# Patient Record
Sex: FEMALE | Race: WHITE | Employment: FULL TIME | URBAN - METROPOLITAN AREA
[De-identification: names, ages, dates, MRNs, and addresses within clinical notes are randomized per-mention and may not be internally consistent; named-entity substitution may affect disease eponyms.]

---

## 2017-01-21 DIAGNOSIS — R05.9 COUGH: ICD-10-CM

## 2017-01-21 DIAGNOSIS — Z91.09 ENVIRONMENTAL ALLERGIES: ICD-10-CM

## 2017-01-21 RX ORDER — SERTRALINE HYDROCHLORIDE 100 MG/1
TABLET, FILM COATED ORAL
Qty: 30 TAB | Refills: 0 | Status: SHIPPED | OUTPATIENT
Start: 2017-01-21 | End: 2017-03-24 | Stop reason: SDUPTHER

## 2017-01-21 RX ORDER — MONTELUKAST SODIUM 10 MG/1
TABLET ORAL
Qty: 30 TAB | Refills: 0 | Status: SHIPPED | OUTPATIENT
Start: 2017-01-21 | End: 2017-03-24 | Stop reason: SDUPTHER

## 2017-01-22 DIAGNOSIS — R05.9 COUGH: ICD-10-CM

## 2017-01-22 DIAGNOSIS — Z91.09 ENVIRONMENTAL ALLERGIES: ICD-10-CM

## 2017-01-22 RX ORDER — MONTELUKAST SODIUM 10 MG/1
TABLET ORAL
Qty: 30 TAB | Refills: 0 | OUTPATIENT
Start: 2017-01-22

## 2017-02-24 DIAGNOSIS — R05.9 COUGH: ICD-10-CM

## 2017-02-24 DIAGNOSIS — Z91.09 ENVIRONMENTAL ALLERGIES: ICD-10-CM

## 2017-02-24 RX ORDER — SERTRALINE HYDROCHLORIDE 100 MG/1
TABLET, FILM COATED ORAL
Qty: 30 TAB | Refills: 0 | OUTPATIENT
Start: 2017-02-24

## 2017-02-24 RX ORDER — MONTELUKAST SODIUM 10 MG/1
TABLET ORAL
Qty: 30 TAB | Refills: 0 | OUTPATIENT
Start: 2017-02-24

## 2017-03-18 DIAGNOSIS — Z91.09 ENVIRONMENTAL ALLERGIES: ICD-10-CM

## 2017-03-18 DIAGNOSIS — R05.9 COUGH: ICD-10-CM

## 2017-03-18 RX ORDER — MONTELUKAST SODIUM 10 MG/1
TABLET ORAL
Qty: 30 TAB | Refills: 0 | OUTPATIENT
Start: 2017-03-18

## 2017-03-24 ENCOUNTER — OFFICE VISIT (OUTPATIENT)
Dept: INTERNAL MEDICINE CLINIC | Age: 42
End: 2017-03-24

## 2017-03-24 VITALS
HEART RATE: 90 BPM | SYSTOLIC BLOOD PRESSURE: 112 MMHG | DIASTOLIC BLOOD PRESSURE: 78 MMHG | BODY MASS INDEX: 35.74 KG/M2 | HEIGHT: 62 IN | OXYGEN SATURATION: 99 % | RESPIRATION RATE: 16 BRPM | WEIGHT: 194.2 LBS | TEMPERATURE: 97.1 F

## 2017-03-24 DIAGNOSIS — Z91.09 ENVIRONMENTAL ALLERGIES: ICD-10-CM

## 2017-03-24 DIAGNOSIS — R42 LIGHTHEADEDNESS: Primary | ICD-10-CM

## 2017-03-24 DIAGNOSIS — F32.9 REACTIVE DEPRESSION (SITUATIONAL): ICD-10-CM

## 2017-03-24 RX ORDER — MONTELUKAST SODIUM 10 MG/1
TABLET ORAL
Qty: 90 TAB | Refills: 1 | Status: SHIPPED | OUTPATIENT
Start: 2017-03-24 | End: 2017-09-19 | Stop reason: SDUPTHER

## 2017-03-24 RX ORDER — SERTRALINE HYDROCHLORIDE 100 MG/1
TABLET, FILM COATED ORAL
Qty: 90 TAB | Refills: 1 | Status: SHIPPED | OUTPATIENT
Start: 2017-03-24 | End: 2017-09-19 | Stop reason: SDUPTHER

## 2017-03-24 NOTE — PROGRESS NOTES
Chief Complaint   Patient presents with    Medication Refill    Dizziness     light headedness about one week     Pt states she has not passed out but feels that she may but upon sitting she feels slightly better.  Pt also mentioned she feels weird feeling in legs (neuro discomfort)

## 2017-03-24 NOTE — PROGRESS NOTES
Subjective:      Tres Chin is a 37 y.o. female who presents today for med refills and lightheadedness. This past week she has an intermittent dull, generalized headaches and lightheadedness x 1 week. Sx not aggravated by position change. LMP 2/25/2017. Mild ear pressure and sinus pressure. Intermittent sneezing, rhinorrhea, cough. She takes Flonase and Allegra daily (which she has taken for years). Also using netti pot prn. Ran out of Singulair and Zoloft 2 weeks ago. She has been feeling more sad recently. No SI or HI, n/v, f/c, aura, tinnitus. Current Outpatient Prescriptions   Medication Sig Dispense Refill    montelukast (SINGULAIR) 10 mg tablet TAKE 1 TABLET BY MOUTH EVERY DAY 30 Tab 0    sertraline (ZOLOFT) 100 mg tablet TAKE 1 TABLET BY MOUTH EVERY DAY 30 Tab 0    naproxen (NAPROSYN) 500 mg tablet Take 1 Tab by mouth two (2) times daily (with meals). 30 Tab 0    SUMAtriptan (IMITREX) 50 mg tablet Take 1 Tab by mouth once as needed for Migraine (may repeat in 2 hours after initial dose) for up to 1 dose. 10 Tab 0    fluticasone (FLONASE) 50 mcg/actuation nasal spray nightly.  Cholecalciferol, Vitamin D3, (VITAMIN D3) 2,000 unit cap capsule Take  by mouth daily.  ascorbic acid (VITAMIN C) 250 mg tablet Take 250 mg by mouth daily.  multivitamin (ONE A DAY) tablet Take 1 Tab by mouth daily.  fexofenadine (ALLEGRA) 180 mg tablet Take 180 mg by mouth daily. No Known Allergies  Past Medical History:   Diagnosis Date    Allergic rhinitis     Infertility     Dr. Ruslan Randolph Reactive depression (situational)     Situational stress     Vitamin D deficiency 2/2015        Review of Systems    Pertinent items are noted in HPI.      Objective:     Visit Vitals    /78 (BP 1 Location: Left arm, BP Patient Position: Sitting)    Pulse 90    Temp 97.1 °F (36.2 °C) (Oral)    Resp 16    Ht 5' 2\" (1.575 m)    Wt 194 lb 3.2 oz (88.1 kg)    LMP 02/25/2017 (Approximate)  SpO2 99%    BMI 35.52 kg/m2     General appearance: alert, cooperative, no distress, appears stated age, pleasant talkative white morbidly obese female  HEENT: TM's dull, nasal mucosa boggy, no swollen turbinates, pnd, posterior pharynx non-erythematous  Neck: supple, symmetrical, trachea midline and no adenopathy  Lungs: clear to auscultation bilaterally  Heart: regular rate and rhythm, S1, S2 normal, no murmur, click, rub or gallop  Neuro: A&Ox3, CN II-XII intact, gait and coordination normal  Psych: appropriate mood and affect. Behavior is normal. Judgment and thought content normal.    Assessment/Plan:   Lightheadedness - likely d/t stopping Zoloft 100 mg abruptly as well as uncontrolled allergies. Restart both Zoloft and Singulair. Start at 50 mg of ZOloft x 1 week then 100 mg daily. Continue Allegra and Flonase. Follow up if symptoms worsen or fail to improve. -     CBC W/O DIFF  -     METABOLIC PANEL, COMPREHENSIVE  -     TSH RFX ON ABNORMAL TO FREE T4    Environmental allergies  -     montelukast (SINGULAIR) 10 mg tablet; TAKE 1 TABLET BY MOUTH EVERY DAY    Reactive depression (situational)   -     sertraline (ZOLOFT) 100 mg tablet; TAKE 1 TABLET BY MOUTH EVERY DAY    Follow-up Disposition:  Return in about 3 months (around 6/24/2017) for physical and re-establish care with Dr. Reji Jose or sooner if needed. Advised her to call back or return to office if symptoms worsen/change/persist.  Discussed expected course/resolution/complications of diagnosis in detail with patient. Medication risks/benefits/costs/interactions/alternatives discussed with patient. She was given an after visit summary which includes diagnoses, current medications, & vitals. She expressed understanding with the diagnosis and plan.     David Nuñez PA-C

## 2017-03-24 NOTE — MR AVS SNAPSHOT
Visit Information Date & Time Provider Department Dept. Phone Encounter #  
 3/24/2017 12:20 PM KATE A[facility] [phone] Follow-up Instructions Return in about 3 months (around 6/24/2017) for physical and re-establish care with Dr. Summer Cruz or sooner if needed. Upcoming Health Maintenance Date Due  
 PAP AKA CERVICAL CYTOLOGY 11/3/2018 DTaP/Tdap/Td series (2 - Td) 11/3/2025 Allergies as of 3/24/2017  Review Complete On: 3/24/2017 By: Soledad Mitchell No Known Allergies Current Immunizations  Reviewed on 11/3/2014 Name Date Influenza Vaccine 10/29/2014, 9/3/2013 Tdap 11/3/2015 Not reviewed this visit You Were Diagnosed With   
  
 Codes Comments Lightheadedness    -  Primary ICD-10-CM: P02 ICD-9-CM: 780.4 Environmental allergies     ICD-10-CM: Z91.09 
ICD-9-CM: V15.09 Reactive depression (situational)     ICD-10-CM: F32.9 ICD-9-CM: 300.4 Vitals BP Pulse Temp Resp Height(growth percentile) Weight(growth percentile) 112/78 (BP 1 Location: Left arm, BP Patient Position: Sitting) 90 97.1 °F (36.2 °C) (Oral) 16 5' 2\" (1.575 m) 194 lb 3.2 oz (88.1 kg) LMP SpO2 BMI OB Status Smoking Status 02/25/2017 (Approximate) 99% 35.52 kg/m2 Having regular periods Never Smoker Vitals History BMI and BSA Data Body Mass Index Body Surface Area 35.52 kg/m 2 1.96 m 2 Preferred Pharmacy Pharmacy Name Phone [id number] [phone] Your Updated Medication List  
  
   
This list is accurate as of: 3/24/17  1:00 PM.  Always use your most recent med list. ALLEGRA 180 mg tablet Generic drug:  fexofenadine Take 180 mg by mouth daily. FLONASE 50 mcg/actuation nasal spray Generic drug:  fluticasone  
nightly. montelukast 10 mg tablet Commonly known as:  SINGULAIR  
 TAKE 1 TABLET BY MOUTH EVERY DAY  
  
 multivitamin tablet Commonly known as:  ONE A DAY Take 1 Tab by mouth daily. naproxen 500 mg tablet Commonly known as:  NAPROSYN Take 1 Tab by mouth two (2) times daily (with meals). sertraline 100 mg tablet Commonly known as:  ZOLOFT  
TAKE 1 TABLET BY MOUTH EVERY DAY  
  
 SUMAtriptan 50 mg tablet Commonly known as:  IMITREX Take 1 Tab by mouth once as needed for Migraine (may repeat in 2 hours after initial dose) for up to 1 dose. VITAMIN C 250 mg tablet Generic drug:  ascorbic acid (vitamin C) Take 250 mg by mouth daily. VITAMIN D3 2,000 unit Cap capsule Generic drug:  Cholecalciferol (Vitamin D3) Take  by mouth daily. Prescriptions Sent to Pharmacy Refills  
 sertraline (ZOLOFT) 100 mg tablet 1 Sig: TAKE 1 TABLET BY MOUTH EVERY DAY Class: Normal  
 Pharmacy: [facility]  Ph #: [phone]  
 montelukast (SINGULAIR) 10 mg tablet 1 Sig: TAKE 1 TABLET BY MOUTH EVERY DAY Class: Normal  
 Pharmacy: [address] Ph #: [phone] We Performed the Following CBC W/O DIFF [78885 CPT(R)] METABOLIC PANEL, COMPREHENSIVE [44997 CPT(R)] TSH RFX ON ABNORMAL TO FREE T4 [[id number] Custom] Follow-up Instructions Return in about 3 months (around 6/24/2017) for physical and re-establish care with Dr. Tuan Kan or sooner if needed. Introducing [facility] & HEALTH SERVICES! Dear Mouna Diaz: 
Thank you for requesting a [employer] account. Our records indicate that you already have an active [employer] account. You can access your account anytime at [email or web address] Did you know that you can access your hospital and ER discharge instructions at any time in [employer]? You can also review all of your test results from your hospital stay or ER visit. Additional Information If you have questions, please visit the Frequently Asked Questions section of the [employer] website at [email or web address] Remember, [employer]Builder is NOT to be used for urgent needs. For medical emergencies, dial 911. Now available from your iPhone and Android! Please provide this summary of care documentation to your next provider. Your primary care clinician is listed as Tony Estrada. If you have any questions after today's visit, please call [phone].

## 2017-03-25 LAB
ALBUMIN SERPL-MCNC: 4.5 G/DL (ref 3.5–5.5)
ALBUMIN/GLOB SERPL: 1.9 {RATIO} (ref 1.2–2.2)
ALP SERPL-CCNC: 73 IU/L (ref 39–117)
ALT SERPL-CCNC: 21 IU/L (ref 0–32)
AST SERPL-CCNC: 19 IU/L (ref 0–40)
BILIRUB SERPL-MCNC: 0.2 MG/DL (ref 0–1.2)
BUN SERPL-MCNC: 11 MG/DL (ref 6–24)
BUN/CREAT SERPL: 19 (ref 9–23)
CALCIUM SERPL-MCNC: 9.2 MG/DL (ref 8.7–10.2)
CHLORIDE SERPL-SCNC: 100 MMOL/L (ref 96–106)
CO2 SERPL-SCNC: 24 MMOL/L (ref 18–29)
CREAT SERPL-MCNC: 0.57 MG/DL (ref 0.57–1)
ERYTHROCYTE [DISTWIDTH] IN BLOOD BY AUTOMATED COUNT: 14.2 % (ref 12.3–15.4)
GLOBULIN SER CALC-MCNC: 2.4 G/DL (ref 1.5–4.5)
GLUCOSE SERPL-MCNC: 88 MG/DL (ref 65–99)
HCT VFR BLD AUTO: 38.7 % (ref 34–46.6)
HGB BLD-MCNC: 12.9 G/DL (ref 11.1–15.9)
MCH RBC QN AUTO: 30.1 PG (ref 26.6–33)
MCHC RBC AUTO-ENTMCNC: 33.3 G/DL (ref 31.5–35.7)
MCV RBC AUTO: 90 FL (ref 79–97)
PLATELET # BLD AUTO: 251 X10E3/UL (ref 150–379)
POTASSIUM SERPL-SCNC: 4.2 MMOL/L (ref 3.5–5.2)
PROT SERPL-MCNC: 6.9 G/DL (ref 6–8.5)
RBC # BLD AUTO: 4.29 X10E6/UL (ref 3.77–5.28)
SODIUM SERPL-SCNC: 140 MMOL/L (ref 134–144)
TSH SERPL DL<=0.005 MIU/L-ACNC: 3.63 UIU/ML (ref 0.45–4.5)
WBC # BLD AUTO: 7.2 X10E3/UL (ref 3.4–10.8)

## 2017-06-26 ENCOUNTER — OFFICE VISIT (OUTPATIENT)
Dept: INTERNAL MEDICINE CLINIC | Age: 42
End: 2017-06-26

## 2017-06-26 VITALS
RESPIRATION RATE: 16 BRPM | HEIGHT: 62 IN | DIASTOLIC BLOOD PRESSURE: 62 MMHG | HEART RATE: 65 BPM | BODY MASS INDEX: 35.74 KG/M2 | SYSTOLIC BLOOD PRESSURE: 110 MMHG | TEMPERATURE: 97.8 F | WEIGHT: 194.2 LBS | OXYGEN SATURATION: 98 %

## 2017-06-26 DIAGNOSIS — F32.9 REACTIVE DEPRESSION: ICD-10-CM

## 2017-06-26 DIAGNOSIS — Z00.00 ROUTINE GENERAL MEDICAL EXAMINATION AT A HEALTH CARE FACILITY: Primary | ICD-10-CM

## 2017-06-26 DIAGNOSIS — E78.5 HYPERLIPIDEMIA, UNSPECIFIED HYPERLIPIDEMIA TYPE: ICD-10-CM

## 2017-06-26 NOTE — PROGRESS NOTES
Chief Complaint   Patient presents with    Complete Physical     (Rm #10)     1. Have you been to the ER, urgent care clinic since your last visit? Hospitalized since your last visit? No    2. Have you seen or consulted any other health care providers outside of the [address] [place] [address] since your last visit? Include any pap smears or colon screening.  No

## 2017-06-26 NOTE — PROGRESS NOTES
Complete Physical ((Rm #10))       HPI:  Claudene Beach is a 40y.o. year old female who is here to establish care. She  had her medical care:    ADM      She reports the following history and medical concerns:      zoloft 100 mg- anxiety and depression. Had withdrawal when trying to stop on it. A couple of years ago. Situational 2 years ago,  Ok to cut in half if she wants. Allergies- singulair 10 mg daily. Needs to take it every day, flonase and allegra. Alleve- once a week. Take with food in stomach      Migraines- last episode this summer    Takes vitamin D    Chol at work was high. January 2017    Overweight- exercises (starting program). Assessment and Plan        1. Routine general medical examination at a health care facility  Well exam.  Needs labs. Start exercise program.   - CBC WITH AUTOMATED DIFF; Future  - METABOLIC PANEL, COMPREHENSIVE; Future  - LIPID PANEL; Future  - TSH 3RD GENERATION; Future  - URINALYSIS W/ RFLX MICROSCOPIC; Future  - VITAMIN D, 25 HYDROXY; Future  - CBC WITH AUTOMATED DIFF; Future  - METABOLIC PANEL, COMPREHENSIVE; Future  - LIPID PANEL; Future  - TSH 3RD GENERATION; Future  - URINALYSIS W/ RFLX MICROSCOPIC; Future  - VITAMIN D, 25 HYDROXY; Future    2. Reactive depression  Reviewed side effects of medication and states depression is stable. No impulsive thoughts, sleep is not affected by medication. No GI symptoms. Pt instructed to call if above symptoms and agreed to stay on  Sertraline but ok to try half. 3. Hyperlipidemia, unspecified hyperlipidemia type  Not on meds. Hx of high chol. Diet reviewed. Recheck chol.  - LIPID PANEL;  Future        Visit Vitals    /62    Pulse 65    Temp 97.8 °F (36.6 °C) (Oral)    Resp 16    Ht 5' 2\" (1.575 m)    Wt 194 lb 3.2 oz (88.1 kg)    SpO2 98%    BMI 35.52 kg/m2       Historical Data    Past Medical History:   Diagnosis Date    Allergic rhinitis     Infertility     Dr. Bridgette Brock Reactive depression (situational)     Situational stress     Vitamin D deficiency 2/2015       Past Surgical History:   Procedure Laterality Date    HX ORTHOPAEDIC  2010    ORIF L ankle       Outpatient Encounter Prescriptions as of 6/26/2017   Medication Sig Dispense Refill    sertraline (ZOLOFT) 100 mg tablet TAKE 1 TABLET BY MOUTH EVERY DAY 90 Tab 1    montelukast (SINGULAIR) 10 mg tablet TAKE 1 TABLET BY MOUTH EVERY DAY 90 Tab 1    naproxen (NAPROSYN) 500 mg tablet Take 1 Tab by mouth two (2) times daily (with meals). 30 Tab 0    fluticasone (FLONASE) 50 mcg/actuation nasal spray nightly.  ascorbic acid (VITAMIN C) 250 mg tablet Take 250 mg by mouth daily.  multivitamin (ONE A DAY) tablet Take 1 Tab by mouth daily.  fexofenadine (ALLEGRA) 180 mg tablet Take 180 mg by mouth daily.  SUMAtriptan (IMITREX) 50 mg tablet Take 1 Tab by mouth once as needed for Migraine (may repeat in 2 hours after initial dose) for up to 1 dose. 10 Tab 0    Cholecalciferol, Vitamin D3, (VITAMIN D3) 2,000 unit cap capsule Take  by mouth daily. No facility-administered encounter medications on file as of 6/26/2017. No Known Allergies     Social History     Social History    Marital status:      Spouse name: N/A    Number of children: N/A    Years of education: N/A     Occupational History    Not on file. Social History Main Topics    Smoking status: Never Smoker    Smokeless tobacco: Never Used    Alcohol use 0.0 - 0.5 oz/week     0 - 1 Glasses of wine per week    Drug use: No    Sexual activity: Yes     Partners: Male     Other Topics Concern    Not on file     Social History Narrative        family history includes Asthma in her brother and father; Heart Disease (age of onset: 76) in her father; Taryn Mileyy (age of onset: 40) in her brother; Obesity in her mother. Review of Systems   Constitutional: Negative for weight loss. Eyes: Negative for blurred vision. Respiratory: Negative for shortness of breath. Cardiovascular: Negative for chest pain. Gastrointestinal: Negative for abdominal pain. Genitourinary: Negative for dysuria and frequency. Skin: Negative for rash. Neurological: Negative for dizziness, focal weakness, weakness and headaches. Endo/Heme/Allergies: Negative for environmental allergies. Does not bruise/bleed easily. Physical Exam   Constitutional: She is oriented to person, place, and time. She appears well-developed and well-nourished. No distress. HENT:   Mouth/Throat: No oropharyngeal exudate. Eyes: Conjunctivae are normal. Pupils are equal, round, and reactive to light. Left eye exhibits no discharge. No scleral icterus. Neck: No thyromegaly present. Cardiovascular: Normal rate, regular rhythm and normal heart sounds. Exam reveals no gallop and no friction rub. No murmur heard. Pulmonary/Chest: No respiratory distress. Abdominal: She exhibits no distension. Musculoskeletal: Normal range of motion. She exhibits no edema or deformity. Lymphadenopathy:     She has no cervical adenopathy. Neurological: She is alert and oriented to person, place, and time. Skin: Skin is warm and dry. No erythema. Psychiatric: She has a normal mood and affect. Judgment and thought content normal.   Nursing note and vitals reviewed. Ortho Exam       No orders of the defined types were placed in this encounter. I have reviewed the patient's medical history in detail and updated the computerized patient record. We had a prolonged discussion about these complex clinical issues and went over the various important aspects to consider. All questions were answered. Advised her to call back or return to office if symptoms do not improve, change in nature, or persist.    She was given an after visit summary or informed of Familiar Access which includes patient instructions, diagnoses, current medications, & vitals.   She expressed understanding with the diagnosis and plan.

## 2017-09-19 DIAGNOSIS — F32.9 REACTIVE DEPRESSION (SITUATIONAL): ICD-10-CM

## 2017-09-19 DIAGNOSIS — Z91.09 ENVIRONMENTAL ALLERGIES: ICD-10-CM

## 2017-09-19 RX ORDER — MONTELUKAST SODIUM 10 MG/1
TABLET ORAL
Qty: 90 TAB | Refills: 3 | Status: SHIPPED | OUTPATIENT
Start: 2017-09-19 | End: 2018-12-07

## 2017-09-19 RX ORDER — SERTRALINE HYDROCHLORIDE 100 MG/1
TABLET, FILM COATED ORAL
Qty: 90 TAB | Refills: 3 | Status: SHIPPED | OUTPATIENT
Start: 2017-09-19 | End: 2018-11-15 | Stop reason: SDUPTHER

## 2017-09-19 NOTE — TELEPHONE ENCOUNTER
Last office visit- 6/26/17  Next office visit- 6/27/18  Last Refill- 3/24/17      Requested Prescriptions     Pending Prescriptions Disp Refills    montelukast (SINGULAIR) 10 mg tablet 90 Tab 1     Sig: TAKE 1 TABLET BY MOUTH EVERY DAY    sertraline (ZOLOFT) 100 mg tablet 90 Tab 1     Sig: TAKE 1 TABLET BY MOUTH EVERY DAY

## 2018-04-04 ENCOUNTER — OFFICE VISIT (OUTPATIENT)
Dept: INTERNAL MEDICINE CLINIC | Age: 43
End: 2018-04-04

## 2018-04-04 VITALS
DIASTOLIC BLOOD PRESSURE: 84 MMHG | SYSTOLIC BLOOD PRESSURE: 142 MMHG | WEIGHT: 194 LBS | RESPIRATION RATE: 14 BRPM | HEIGHT: 62 IN | TEMPERATURE: 97.6 F | HEART RATE: 82 BPM | BODY MASS INDEX: 35.7 KG/M2 | OXYGEN SATURATION: 98 %

## 2018-04-04 DIAGNOSIS — Z99.89 OSA ON CPAP: ICD-10-CM

## 2018-04-04 DIAGNOSIS — G47.33 OSA ON CPAP: ICD-10-CM

## 2018-04-04 DIAGNOSIS — H10.31 ACUTE CONJUNCTIVITIS OF RIGHT EYE, UNSPECIFIED ACUTE CONJUNCTIVITIS TYPE: Primary | ICD-10-CM

## 2018-04-04 RX ORDER — CIPROFLOXACIN HYDROCHLORIDE 3.5 MG/ML
SOLUTION/ DROPS TOPICAL
Qty: 10 ML | Refills: 0 | Status: SHIPPED | OUTPATIENT
Start: 2018-04-04 | End: 2018-06-27

## 2018-04-04 NOTE — MR AVS SNAPSHOT
[facility], [address] 
[phone] Patient: Aurelio Vaughan MRN: [id number] ZFO:[birth year] Visit Information Date & Time Provider Department Dept. Phone Encounter #  
 4/4/2018 10:00 AM JUANA Pacheco [facility] [phone] [id number] Your Appointments 6/27/2018  8:15 AM  
Complete Physical with MD Williams Jordan [facility]) Appt Note: physical  
 [address], [address]  
[facility] [address], [place] [address] [facility] [address] Upcoming Health Maintenance Date Due  
 PAP AKA CERVICAL CYTOLOGY 11/3/2018 DTaP/Tdap/Td series (2 - Td) 11/3/2025 Allergies as of 4/4/2018  Review Complete On: 4/4/2018 By: Erwin Bettencourt NP No Known Allergies Current Immunizations  Reviewed on 11/3/2014 Name Date Influenza Vaccine 10/29/2014, 9/3/2013 Influenza Vaccine Ignacio Kappa) 10/17/2017 Tdap 11/3/2015 Not reviewed this visit You Were Diagnosed With   
  
 Codes Comments Acute conjunctivitis of right eye, unspecified acute conjunctivitis type    -  Primary ICD-10-CM: H10.31 ICD-9-CM: 372.00 LEONIE on CPAP     ICD-10-CM: G47.33, Z99.89 ICD-9-CM: 327.23, V46.8 Vitals BP Pulse Temp Resp Height(growth percentile) Weight(growth percentile) 142/84 (BP 1 Location: Left arm, BP Patient Position: Sitting) 82 97.6 °F (36.4 °C) (Oral) 14 5' 2\" (1.575 m) 194 lb (88 kg) SpO2 BMI OB Status Smoking Status 98% 35.48 kg/m2 Having regular periods Never Smoker Vitals History BMI and BSA Data Body Mass Index Body Surface Area  
 35.48 kg/m 2 1.96 m 2 Preferred Pharmacy [facility] Name Phone [id number] [phone] Your Updated Medication List  
  
   
 This list is accurate as of 18 11:04 AM.  Always use your most recent med list. ALLEGRA 180 mg tablet Generic drug:  fexofenadine Take 180 mg by mouth daily. ciprofloxacin HCl 0.3 % ophthalmic solution Commonly known as:  CILOXAN  
1 drop into affected eye (s) every 2 hrs while awake for 24 hrs, then QID for 5 days FLONASE 50 mcg/actuation nasal spray Generic drug:  fluticasone  
nightly. montelukast 10 mg tablet Commonly known as:  SINGULAIR  
TAKE 1 TABLET BY MOUTH EVERY DAY  
  
 multivitamin tablet Commonly known as:  ONE A DAY Take 1 Tab by mouth daily. OMEGA 3 PO Take  by mouth. sertraline 100 mg tablet Commonly known as:  ZOLOFT  
TAKE 1 TABLET BY MOUTH EVERY DAY  
  
 SUMAtriptan 50 mg tablet Commonly known as:  IMITREX Take 1 Tab by mouth once as needed for Migraine (may repeat in 2 hours after initial dose) for up to 1 dose. VITAMIN D3 2,000 unit Cap capsule Generic drug:  Cholecalciferol (Vitamin D3) Take  by mouth daily. Prescriptions Sent to Pharmacy Refills  
 ciprofloxacin HCl (CILOXAN) 0.3 % ophthalmic solution 0 Si drop into affected eye (s) every 2 hrs while awake for 24 hrs, then QID for 5 days Class: Normal  
 Pharmacy: [address] [place] [address] #: [phone] Patient Instructions Pinkeye: Care Instructions Your Care Instructions Pinkeye is redness and swelling of the eye surface and the conjunctiva (the lining of the eyelid and the covering of the white part of the eye). Pinkeye is also called conjunctivitis. Pinkeye is often caused by infection with bacteria or a virus. Dry air, allergies, smoke, and chemicals are other common causes. Pinkeye often clears on its own in 7 to 10 days. Antibiotics only help if the pinkeye is caused by bacteria.  Pinkeye caused by infection spreads easily. If an allergy or chemical is causing pinkeye, it will not go away unless you can avoid whatever is causing it. Follow-up care is a key part of your treatment and safety. Be sure to make and go to all appointments, and call your doctor if you are having problems. It's also a good idea to know your test results and keep a list of the medicines you take. How can you care for yourself at home? · Wash your hands often. Always wash them before and after you treat pinkeye or touch your eyes or face. · Use moist cotton or a clean, wet cloth to remove crust. Wipe from the inside corner of the eye to the outside. Use a clean part of the cloth for each wipe. · Put cold or warm wet cloths on your eye a few times a day if the eye hurts. · Do not wear contact lenses or eye makeup until the pinkeye is gone. Throw away any eye makeup you were using when you got pinkeye. Clean your contacts and storage case. If you wear disposable contacts, use a new pair when your eye has cleared and it is safe to wear contacts again. · If the doctor gave you antibiotic ointment or eyedrops, use them as directed. Use the medicine for as long as instructed, even if your eye starts looking better soon. Keep the bottle tip clean, and do not let it touch the eye area. · To put in eyedrops or ointment: ¨ Tilt your head back, and pull your lower eyelid down with one finger. ¨ Drop or squirt the medicine inside the lower lid. ¨ Close your eye for 30 to 60 seconds to let the drops or ointment move around. ¨ Do not touch the ointment or dropper tip to your eyelashes or any other surface. · Do not share towels, pillows, or washcloths while you have pinkeye. When should you call for help? Call your doctor now or seek immediate medical care if: 
? · You have pain in your eye, not just irritation on the surface. ? · You have a change in vision or loss of vision. ? · You have an increase in discharge from the eye. ? · Your eye has not started to improve or begins to get worse within 48 hours after you start using antibiotics. ? · Pinkeye lasts longer than 7 days. ? Watch closely for changes in your health, and be sure to contact your doctor if you have any problems. Where can you learn more? Go to http://go-juani.info/. Enter Y392 in the search box to learn more about \"Pinkeye: Care Instructions. \" Current as of: March 20, 2017 Content Version: 11.4 © 1981-9981 [employer]. Care instructions adapted under license by [employer] (which disclaims liability or warranty for this information). If you have questions about a medical condition or this instruction, always ask your healthcare professional. [address] any warranty or liability for your use of this information. Introducing [facility] & HEALTH SERVICES! Dear Jewel Kaplan: 
Thank you for requesting a tinyclues account. Our records indicate that you already have an active tinyclues account. You can access your account anytime at [email or web address] Did you know that you can access your hospital and ER discharge instructions at any time in [employer]clues? You can also review all of your test results from your hospital stay or ER visit. Additional Information If you have questions, please visit the Frequently Asked Questions section of the [employer]ues website at [email or web address] Remember, tinyclues is NOT to be used for urgent needs. For medical emergencies, dial 911. Now available from your iPhone and Android! Please provide this summary of care documentation to your next provider. Your primary care clinician is listed as Sharonda James. If you have any questions after today's visit, please call [phone].

## 2018-04-04 NOTE — PATIENT INSTRUCTIONS
Pinkeye: Care Instructions  Your Care Instructions    Pinkeye is redness and swelling of the eye surface and the conjunctiva (the lining of the eyelid and the covering of the white part of the eye). Pinkeye is also called conjunctivitis. Pinkeye is often caused by infection with bacteria or a virus. Dry air, allergies, smoke, and chemicals are other common causes. Pinkeye often clears on its own in 7 to 10 days. Antibiotics only help if the pinkeye is caused by bacteria. Pinkeye caused by infection spreads easily. If an allergy or chemical is causing pinkeye, it will not go away unless you can avoid whatever is causing it. Follow-up care is a key part of your treatment and safety. Be sure to make and go to all appointments, and call your doctor if you are having problems. It's also a good idea to know your test results and keep a list of the medicines you take. How can you care for yourself at home? · Wash your hands often. Always wash them before and after you treat pinkeye or touch your eyes or face. · Use moist cotton or a clean, wet cloth to remove crust. Wipe from the inside corner of the eye to the outside. Use a clean part of the cloth for each wipe. · Put cold or warm wet cloths on your eye a few times a day if the eye hurts. · Do not wear contact lenses or eye makeup until the pinkeye is gone. Throw away any eye makeup you were using when you got pinkeye. Clean your contacts and storage case. If you wear disposable contacts, use a new pair when your eye has cleared and it is safe to wear contacts again. · If the doctor gave you antibiotic ointment or eyedrops, use them as directed. Use the medicine for as long as instructed, even if your eye starts looking better soon. Keep the bottle tip clean, and do not let it touch the eye area. · To put in eyedrops or ointment:  ¨ Tilt your head back, and pull your lower eyelid down with one finger.   ¨ Drop or squirt the medicine inside the lower lid.  ¨ Close your eye for 30 to 60 seconds to let the drops or ointment move around. ¨ Do not touch the ointment or dropper tip to your eyelashes or any other surface. · Do not share towels, pillows, or washcloths while you have pinkeye. When should you call for help? Call your doctor now or seek immediate medical care if:  ? · You have pain in your eye, not just irritation on the surface. ? · You have a change in vision or loss of vision. ? · You have an increase in discharge from the eye.   ? · Your eye has not started to improve or begins to get worse within 48 hours after you start using antibiotics. ? · Pinkeye lasts longer than 7 days. ? Watch closely for changes in your health, and be sure to contact your doctor if you have any problems. Where can you learn more? Go to http://go-juani.info/. Enter Y392 in the search box to learn more about \"Pinkeye: Care Instructions. \"  Current as of: March 20, 2017  Content Version: 11.4  © 8778-9256 [employer]wise, Incorporated. Care instructions adapted under license by [employer] (which disclaims liability or warranty for this information). If you have questions about a medical condition or this instruction, always ask your healthcare professional. [address] any warranty or liability for your use of this information.

## 2018-04-04 NOTE — PROGRESS NOTES
41 yo female with 24 hrs of OD irritation and discharge; it was stuck shut this AM.  OS is not affected. No recent head cold. No feverishness, but mildly achy joints yesterday. She works with a college student population. PE: Overweight WF   Face - OD - conjunctival rednes and watery eye   Neck - no nodes    Imp: Conjunctivitis OD    Plan: Cipro ophth drops (used successfully in past)  _________________________  Expected course of current diagnosed problem(s) as well as expected progression and possible complications, and desired follow up with provider are discussed with patient. Patient is encouraged to be back in touch with any questions or concerns. Patient expresses understanding of plan of care. Patient is given AVS which includes diagnoses, current medications, vitals.

## 2018-04-04 NOTE — PROGRESS NOTES
Reviewed record in preparation for visit and have obtained necessary documentation. Identified pt with two pt identifiers(name and ). Health Maintenance Due   Topic    Influenza Age 5 to Adult   Done 10/2017 per patient. Chief Complaint   Patient presents with    Pink Eye     Pt c/o pink eye (OD) started yesterday, discharge, irritation, and swelling of her eyelid. Pt c/o chronic nasal congestion, no ear pain, has not used eye drops, or any sinus congestion.          Wt Readings from Last 3 Encounters:   18 194 lb (88 kg)   17 194 lb 3.2 oz (88.1 kg)   17 194 lb 3.2 oz (88.1 kg)     Temp Readings from Last 3 Encounters:   18 97.6 °F (36.4 °C) (Oral)   17 97.8 °F (36.6 °C) (Oral)   17 97.1 °F (36.2 °C) (Oral)     BP Readings from Last 3 Encounters:   18 142/84   17 110/62   17 112/78     Pulse Readings from Last 3 Encounters:   18 82   17 65   17 90

## 2018-04-04 NOTE — LETTER
NOTIFICATION RETURN TO WORK / SCHOOL 
 
4/4/2018 11:03 AM 
 
Ms. David Stockton [address] [id number] To Whom It May Concern: 
 
David Stockton is currently under the care of Jasvir Cruz. She will return to work on: April 6, 2018. If there are questions or concerns please have the patient contact our office. Sincerely, Subhash Villa NP

## 2018-06-27 ENCOUNTER — OFFICE VISIT (OUTPATIENT)
Dept: INTERNAL MEDICINE CLINIC | Age: 43
End: 2018-06-27

## 2018-06-27 VITALS
TEMPERATURE: 98.1 F | HEART RATE: 84 BPM | HEIGHT: 62 IN | RESPIRATION RATE: 15 BRPM | DIASTOLIC BLOOD PRESSURE: 80 MMHG | WEIGHT: 202 LBS | SYSTOLIC BLOOD PRESSURE: 110 MMHG | BODY MASS INDEX: 37.17 KG/M2 | OXYGEN SATURATION: 95 %

## 2018-06-27 DIAGNOSIS — F32.9 REACTIVE DEPRESSION (SITUATIONAL): ICD-10-CM

## 2018-06-27 DIAGNOSIS — E66.01 SEVERE OBESITY (BMI 35.0-39.9): ICD-10-CM

## 2018-06-27 DIAGNOSIS — Z99.89 OSA ON CPAP: ICD-10-CM

## 2018-06-27 DIAGNOSIS — E55.9 VITAMIN D DEFICIENCY: ICD-10-CM

## 2018-06-27 DIAGNOSIS — G47.33 OSA ON CPAP: ICD-10-CM

## 2018-06-27 DIAGNOSIS — Z00.00 ROUTINE GENERAL MEDICAL EXAMINATION AT A HEALTH CARE FACILITY: Primary | ICD-10-CM

## 2018-06-27 DIAGNOSIS — Z91.09 ENVIRONMENTAL ALLERGIES: ICD-10-CM

## 2018-06-27 NOTE — PROGRESS NOTES
HPI:  Carolyn Mcnamara is a 44y.o. year old female who is here for an annual physical:  LAST SEEN: Visit date not found     Wt Readings from Last 3 Encounters:   06/27/18 202 lb (91.6 kg)   04/04/18 194 lb (88 kg)   06/26/17 194 lb 3.2 oz (88.1 kg)     Temp Readings from Last 3 Encounters:   06/27/18 98.1 °F (36.7 °C) (Oral)   04/04/18 97.6 °F (36.4 °C) (Oral)   06/26/17 97.8 °F (36.6 °C) (Oral)     BP Readings from Last 3 Encounters:   06/27/18 110/80   04/04/18 142/84   06/26/17 110/62     Pulse Readings from Last 3 Encounters:   06/27/18 84   04/04/18 82   06/26/17 65        She reports the following history and medical concerns:      Under a lot of stress.  suffering mental issues. Eats emotionally. Eats 50-50 out. Has a puppy and walks regularly. Fit board. Going to the gym at [employer]. Was in an opera. Diagnosed with sleep apnea. Dr. Eliazar Briggs. Assessment and Plan        1. Routine general medical examination at a health care facility  Discussed weight. Finding more times of exercise  - cpap machine kit; by Does Not Apply route. - CBC WITH AUTOMATED DIFF  - LIPID PANEL  - TSH REFLEX TO T4  - METABOLIC PANEL, COMPREHENSIVE  - VITAMIN D, 25 HYDROXY  - MICROALBUMIN, UR, RAND W/ MICROALB/CREAT RATIO  - UA/M W/RFLX CULTURE, ROUTINE    2. LEONIE on CPAP  Patient has obstructive sleep apnea and reports compliance and benefit from the CPAP machine. Patient is aware of the complications of nonadherence like heart failure, high blood pressure in the lungs, daytime drowsiness that can lead to dangerous consequences in driving and operating machinery. Compliance will improve quality of life. 3. Vitamin D deficiency  Check Vit D    4. Severe obesity (BMI 35.0-39.9) ([facility])  Overweight   Patient with diagnosis of obesity and height of Height: 5' 2\" (157.5 cm), weight of Weight: 202 lb (91.6 kg) with BMI at Body mass index is 36.95 kg/(m^2).    Wt Readings from Last 3 Encounters:   06/27/18 202 lb (91.6 kg)   04/04/18 194 lb (88 kg)   06/26/17 194 lb 3.2 oz (88.1 kg)        Discussion of past diet and exercise efforts     5. Reactive depression (situational)  Doing well on zoloft. Reviewed side effects of medication and states depression is stable. No impulsive thoughts, sleep is not affected by medication. No GI symptoms. Pt instructed to call if above symptoms and agreed to stay on  zoloft             Historical Data    Vitals:    06/27/18 0810   BP: 110/80   Pulse: 84   Resp: 15   Temp: 98.1 °F (36.7 °C)   TempSrc: Oral   SpO2: 95%   Weight: 202 lb (91.6 kg)   Height: 5' 2\" (1.575 m)         Past Medical History:   Diagnosis Date    Allergic rhinitis     Infertility     Dr. Sonia Schwarz Reactive depression (situational)     Situational stress     Sleep apnea 03/2018    Sleep apnea 2018    Vitamin D deficiency 2/2015       Past Surgical History:   Procedure Laterality Date    HX ORTHOPAEDIC  2010    ORIF L ankle       Outpatient Encounter Prescriptions as of 6/27/2018   Medication Sig Dispense Refill    cpap machine kit by Does Not Apply route.  montelukast (SINGULAIR) 10 mg tablet TAKE 1 TABLET BY MOUTH EVERY DAY 90 Tab 3    sertraline (ZOLOFT) 100 mg tablet TAKE 1 TABLET BY MOUTH EVERY DAY 90 Tab 3    SUMAtriptan (IMITREX) 50 mg tablet Take 1 Tab by mouth once as needed for Migraine (may repeat in 2 hours after initial dose) for up to 1 dose. 10 Tab 0    fluticasone (FLONASE) 50 mcg/actuation nasal spray nightly.  multivitamin (ONE A DAY) tablet Take 1 Tab by mouth daily.  fexofenadine (ALLEGRA) 180 mg tablet Take 180 mg by mouth daily.  [DISCONTINUED] FLAXSEED OIL (OMEGA 3 PO) Take  by mouth.       [DISCONTINUED] ciprofloxacin HCl (CILOXAN) 0.3 % ophthalmic solution 1 drop into affected eye (s) every 2 hrs while awake for 24 hrs, then QID for 5 days 10 mL 0    [DISCONTINUED] Cholecalciferol, Vitamin D3, (VITAMIN D3) 2,000 unit cap capsule Take  by mouth daily. No facility-administered encounter medications on file as of 6/27/2018. No Known Allergies     Social History     Social History    Marital status:      Spouse name: N/A    Number of children: N/A    Years of education: N/A     Occupational History          Social History Main Topics    Smoking status: Never Smoker    Smokeless tobacco: Never Used    Alcohol use 0.0 - 0.5 oz/week     0 - 1 Glasses of wine per week    Drug use: No    Sexual activity: Yes     Partners: Male     Other Topics Concern    Not on file     Social History Narrative    Lives with - Bo Ray        family history includes Asthma in her brother and father; Heart Disease (age of onset: 76) in her father; Darlys Fang (age of onset: 40) in her brother; Obesity in her mother. Review of Systems   Constitutional: Negative for chills, diaphoresis, fever, malaise/fatigue and weight loss. HENT: Negative for hearing loss. Respiratory: Negative for cough. Cardiovascular: Negative for chest pain. Gastrointestinal: Negative for blood in stool and constipation. Genitourinary: Negative for dysuria, flank pain, frequency and urgency. Musculoskeletal: Negative for myalgias. Skin: Negative for rash. Neurological: Negative for dizziness, weakness and headaches. Endo/Heme/Allergies: Does not bruise/bleed easily. Visit Vitals    /80 (BP 1 Location: Left arm, BP Patient Position: Sitting)    Pulse 84    Temp 98.1 °F (36.7 °C) (Oral)    Resp 15    Ht 5' 2\" (1.575 m)    Wt 202 lb (91.6 kg)    LMP 06/04/2018    SpO2 95%    BMI 36.95 kg/m2         Physical Exam   Constitutional: She is oriented to person, place, and time and well-developed, well-nourished, and in no distress. No distress. HENT:   Nose: Nose normal.   Mouth/Throat: Oropharynx is clear and moist.   Eyes: Conjunctivae and EOM are normal.   Neck: Normal range of motion.  Neck supple. No thyromegaly present. Cardiovascular: Normal rate, regular rhythm and normal heart sounds. Exam reveals no gallop and no friction rub. Pulmonary/Chest: Effort normal and breath sounds normal. No respiratory distress. She has no wheezes. She has no rales. Abdominal: Soft. Bowel sounds are normal. She exhibits no distension. There is no tenderness. Musculoskeletal: Normal range of motion. She exhibits no edema, tenderness or deformity. Lymphadenopathy:     She has no cervical adenopathy. Neurological: She is alert and oriented to person, place, and time. Skin: Skin is warm and dry. No rash noted. Psychiatric: Affect and judgment normal.   Vitals reviewed. Ortho Exam     Assessment:  See Above for discussion/plan. Annual Physical completed. I have reviewed the patient's medical history in detail and updated the computerized patient record. We had a prolonged discussion about these complex clinical issues and went over the various important aspects to consider. All questions were answered. Advised her to call back or return to office if symptoms do not improve, change in nature, or persist.    She was given an after visit summary or informed of [facility] [facility] which includes patient instructions, diagnoses, current medications, & vitals. She expressed understanding with the diagnosis and plan.

## 2018-06-27 NOTE — MR AVS SNAPSHOT
[address] [facility], [address] [place] [address] 
[phone] Patient: Karthik Sheth MRN: [id number] PGT:[birth year] Visit Information Date & Time Provider Department Dept. Phone Encounter #  
 6/27/2018  8:15 AM [place] [address] [facility] [phone] [id number] Follow-up Instructions Return in about 1 year (around 6/27/2019) for For yearly physical 30 minutes. Upcoming Health Maintenance Date Due Influenza Age 5 to Adult 8/1/2018 PAP AKA CERVICAL CYTOLOGY 11/3/2018 DTaP/Tdap/Td series (2 - Td) 11/3/2025 Allergies as of 6/27/2018  Review Complete On: 6/27/2018 By: Sharonda James MD  
 No Known Allergies Current Immunizations  Reviewed on 11/3/2014 Name Date Influenza Vaccine 10/29/2014, 9/3/2013 Influenza Vaccine Yessica Inch) 10/17/2017 Tdap 11/3/2015 Not reviewed this visit You Were Diagnosed With   
  
 Codes Comments Routine general medical examination at a health care facility    -  Primary ICD-10-CM: Z00.00 ICD-9-CM: V70.0 LEONIE on CPAP     ICD-10-CM: G47.33, Z99.89 ICD-9-CM: 327.23, V46.8 Vitamin D deficiency     ICD-10-CM: E55.9 ICD-9-CM: 268.9 Severe obesity (BMI 35.0-39.9) (HCC)     ICD-10-CM: E66.01 
ICD-9-CM: 278.01 Reactive depression (situational)     ICD-10-CM: F32.9 ICD-9-CM: 300.4 Vitals BP Pulse Temp Resp Height(growth percentile) Weight(growth percentile) 110/80 (BP 1 Location: Left arm, BP Patient Position: Sitting) 84 98.1 °F (36.7 °C) (Oral) 15 5' 2\" (1.575 m) 202 lb (91.6 kg) LMP SpO2 BMI OB Status Smoking Status 06/04/2018 95% 36.95 kg/m2 Having regular periods Never Smoker Vitals History BMI and BSA Data Body Mass Index Body Surface Area  
 36.95 kg/m 2 2 m 2 Preferred Pharmacy Pharmacy Name Phone [id number] [phone] Your Updated Medication List  
  
   
This list is accurate as of 6/27/18  8:43 AM.  Always use your most recent med list. ALLEGRA 180 mg tablet Generic drug:  fexofenadine Take 180 mg by mouth daily. cpap machine kit  
by Does Not Apply route. FLONASE 50 mcg/actuation nasal spray Generic drug:  fluticasone  
nightly. montelukast 10 mg tablet Commonly known as:  SINGULAIR  
TAKE 1 TABLET BY MOUTH EVERY DAY  
  
 multivitamin tablet Commonly known as:  ONE A DAY Take 1 Tab by mouth daily. sertraline 100 mg tablet Commonly known as:  ZOLOFT  
TAKE 1 TABLET BY MOUTH EVERY DAY  
  
 SUMAtriptan 50 mg tablet Commonly known as:  IMITREX Take 1 Tab by mouth once as needed for Migraine (may repeat in 2 hours after initial dose) for up to 1 dose. We Performed the Following CBC WITH AUTOMATED DIFF [47809 CPT(R)] LIPID PANEL [44049 CPT(R)] METABOLIC PANEL, COMPREHENSIVE [76682 CPT(R)] MICROALBUMIN, UR, RAND W/ MICROALB/CREAT RATIO [id number] CPT(R)] TSH REFLEX TO T4 [24621 CPT(R)] UA/M W/RFLX CULTURE, ROUTINE [[id number] Custom] VITAMIN D, 25 HYDROXY [id number] CPT(R)] Follow-up Instructions Return in about 1 year (around 6/27/2019) for For yearly physical 30 minutes. Introducing [facility]! Dear Chetan Morse: 
Thank you for requesting a [employer] account. Our records indicate that you already have an active [employer] account. You can access your account anytime at [email or web address] Did you know that you can access your hospital and ER discharge instructions at any time in [employer]? You can also review all of your test results from your hospital stay or ER visit. Additional Information If you have questions, please visit the Frequently Asked Questions section of the [employer] website at [email or web address] Remember, [employer] is NOT to be used for urgent needs.  For medical emergencies, dial 911. Now available from your iPhone and Android! Please provide this summary of care documentation to your next provider. Your primary care clinician is listed as Perla Nolasco. If you have any questions after today's visit, please call [phone].

## 2018-06-27 NOTE — PROGRESS NOTES
Reviewed record in preparation for visit and have obtained necessary documentation. Identified pt with two pt identifiers(name and ). There are no preventive care reminders to display for this patient. No chief complaint on file. Wt Readings from Last 3 Encounters:   18 202 lb (91.6 kg)   18 194 lb (88 kg)   17 194 lb 3.2 oz (88.1 kg)     Temp Readings from Last 3 Encounters:   18 98.1 °F (36.7 °C) (Oral)   18 97.6 °F (36.4 °C) (Oral)   17 97.8 °F (36.6 °C) (Oral)     BP Readings from Last 3 Encounters:   18 110/80   18 142/84   17 110/62     Pulse Readings from Last 3 Encounters:   18 84   18 82   17 65           Learning Assessment:  :     Learning Assessment 2018 3/6/2015 2014   PRIMARY LEARNER Patient Patient Patient   HIGHEST LEVEL OF EDUCATION - PRIMARY LEARNER  > 4 YEARS OF COLLEGE > 4 YEARS OF COLLEGE > 4 YEARS OF COLLEGE   BARRIERS PRIMARY LEARNER NONE NONE NONE   CO-LEARNER CAREGIVER No No No   PRIMARY LANGUAGE ENGLISH ENGLISH ENGLISH    NEED - No No   LEARNER PREFERENCE PRIMARY READING DEMONSTRATION READING     - READING LISTENING     - - DEMONSTRATION   LEARNING SPECIAL TOPICS - no no   ANSWERED BY patient patient patient   RELATIONSHIP SELF SELF SELF   ASSESSMENT COMMENT - none -       Depression Screening:  :     PHQ over the last two weeks 2018   Little interest or pleasure in doing things Not at all   Feeling down, depressed or hopeless Not at all   Total Score PHQ 2 0       Fall Risk Assessment:  :     No flowsheet data found. Abuse Screening:  :     Abuse Screening Questionnaire 2018 3/6/2015 2014   Do you ever feel afraid of your partner? N N N   Are you in a relationship with someone who physically or mentally threatens you? N N N   Is it safe for you to go home?  [facility]       Coordination of Care Questionnaire:  :     1) Have you been to an emergency room, urgent care clinic since your last visit? No  Hospitalized since your last visit? No          2) Have you seen or consulted any other health care providers outside of [address] [place] [address] since your last visit? No (Include any pap smears or colon screenings in this section.)    3) Do you have an Advance Directive on file? No    4) Are you interested in receiving information on Advance Directives?  No

## 2018-06-28 LAB
25(OH)D3+25(OH)D2 SERPL-MCNC: 30.6 NG/ML (ref 30–100)
ALBUMIN SERPL-MCNC: 4.4 G/DL (ref 3.5–5.5)
ALBUMIN/CREAT UR: 14.2 MG/G CREAT (ref 0–30)
ALBUMIN/GLOB SERPL: 1.9 {RATIO} (ref 1.2–2.2)
ALP SERPL-CCNC: 72 IU/L (ref 39–117)
ALT SERPL-CCNC: 23 IU/L (ref 0–32)
APPEARANCE UR: CLEAR
AST SERPL-CCNC: 20 IU/L (ref 0–40)
BACTERIA #/AREA URNS HPF: ABNORMAL /[HPF]
BASOPHILS # BLD AUTO: 0 X10E3/UL (ref 0–0.2)
BASOPHILS NFR BLD AUTO: 0 %
BILIRUB SERPL-MCNC: 0.3 MG/DL (ref 0–1.2)
BILIRUB UR QL STRIP: NEGATIVE
BUN SERPL-MCNC: 11 MG/DL (ref 6–24)
BUN/CREAT SERPL: 16 (ref 9–23)
CALCIUM SERPL-MCNC: 9.3 MG/DL (ref 8.7–10.2)
CASTS URNS QL MICRO: ABNORMAL /LPF
CHLORIDE SERPL-SCNC: 103 MMOL/L (ref 96–106)
CHOLEST SERPL-MCNC: 239 MG/DL (ref 100–199)
CO2 SERPL-SCNC: 23 MMOL/L (ref 20–29)
COLOR UR: YELLOW
CREAT SERPL-MCNC: 0.67 MG/DL (ref 0.57–1)
CREAT UR-MCNC: 155.4 MG/DL
EOSINOPHIL # BLD AUTO: 0.3 X10E3/UL (ref 0–0.4)
EOSINOPHIL NFR BLD AUTO: 4 %
EPI CELLS #/AREA URNS HPF: >10 /HPF
ERYTHROCYTE [DISTWIDTH] IN BLOOD BY AUTOMATED COUNT: 14.5 % (ref 12.3–15.4)
GLOBULIN SER CALC-MCNC: 2.3 G/DL (ref 1.5–4.5)
GLUCOSE SERPL-MCNC: 101 MG/DL (ref 65–99)
GLUCOSE UR QL: NEGATIVE
HCT VFR BLD AUTO: 38.1 % (ref 34–46.6)
HDLC SERPL-MCNC: 43 MG/DL
HGB BLD-MCNC: 12.3 G/DL (ref 11.1–15.9)
HGB UR QL STRIP: NEGATIVE
IMM GRANULOCYTES # BLD: 0 X10E3/UL (ref 0–0.1)
IMM GRANULOCYTES NFR BLD: 0 %
INTERPRETATION, 910389: NORMAL
KETONES UR QL STRIP: NEGATIVE
LDLC SERPL CALC-MCNC: 154 MG/DL (ref 0–99)
LEUKOCYTE ESTERASE UR QL STRIP: NEGATIVE
LYMPHOCYTES # BLD AUTO: 2 X10E3/UL (ref 0.7–3.1)
LYMPHOCYTES NFR BLD AUTO: 24 %
MCH RBC QN AUTO: 29.1 PG (ref 26.6–33)
MCHC RBC AUTO-ENTMCNC: 32.3 G/DL (ref 31.5–35.7)
MCV RBC AUTO: 90 FL (ref 79–97)
MICRO URNS: ABNORMAL
MICROALBUMIN UR-MCNC: 22 UG/ML
MONOCYTES # BLD AUTO: 0.6 X10E3/UL (ref 0.1–0.9)
MONOCYTES NFR BLD AUTO: 7 %
MUCOUS THREADS URNS QL MICRO: PRESENT
NEUTROPHILS # BLD AUTO: 5.4 X10E3/UL (ref 1.4–7)
NEUTROPHILS NFR BLD AUTO: 65 %
NITRITE UR QL STRIP: NEGATIVE
PH UR STRIP: 8 [PH] (ref 5–7.5)
PLATELET # BLD AUTO: 276 X10E3/UL (ref 150–379)
POTASSIUM SERPL-SCNC: 4.6 MMOL/L (ref 3.5–5.2)
PROT SERPL-MCNC: 6.7 G/DL (ref 6–8.5)
PROT UR QL STRIP: ABNORMAL
RBC # BLD AUTO: 4.22 X10E6/UL (ref 3.77–5.28)
RBC #/AREA URNS HPF: ABNORMAL /HPF
SODIUM SERPL-SCNC: 142 MMOL/L (ref 134–144)
SP GR UR: 1.03 (ref 1–1.03)
TRIGL SERPL-MCNC: 210 MG/DL (ref 0–149)
TSH SERPL DL<=0.005 MIU/L-ACNC: 2.63 UIU/ML (ref 0.45–4.5)
URINALYSIS REFLEX, 377202: ABNORMAL
UROBILINOGEN UR STRIP-MCNC: 0.2 MG/DL (ref 0.2–1)
VLDLC SERPL CALC-MCNC: 42 MG/DL (ref 5–40)
WBC # BLD AUTO: 8.4 X10E3/UL (ref 3.4–10.8)
WBC #/AREA URNS HPF: ABNORMAL /HPF

## 2018-07-03 NOTE — PROGRESS NOTES
The cholesterol is still high where I want your LDL at least under 130. The triglycerides and blood sugar indicate you are eating too many carbs. Exercise and weight loss are the key to bring these numbers down as we discussed. Let me know if you have any questions by sending me a message in [facility] or call our office for more urgent matters.   Message sent to patient via [employer]:  July 3, 2018

## 2018-10-12 ENCOUNTER — DOCUMENTATION ONLY (OUTPATIENT)
Dept: INTERNAL MEDICINE CLINIC | Age: 43
End: 2018-10-12

## 2018-11-15 DIAGNOSIS — F32.9 REACTIVE DEPRESSION (SITUATIONAL): ICD-10-CM

## 2018-11-15 RX ORDER — SERTRALINE HYDROCHLORIDE 100 MG/1
TABLET, FILM COATED ORAL
Qty: 90 TAB | Refills: 3 | Status: SHIPPED | OUTPATIENT
Start: 2018-11-15 | End: 2019-11-18 | Stop reason: SDUPTHER

## 2018-12-07 ENCOUNTER — OFFICE VISIT (OUTPATIENT)
Dept: INTERNAL MEDICINE CLINIC | Age: 43
End: 2018-12-07

## 2018-12-07 VITALS
BODY MASS INDEX: 34.96 KG/M2 | SYSTOLIC BLOOD PRESSURE: 108 MMHG | OXYGEN SATURATION: 98 % | HEIGHT: 62 IN | WEIGHT: 190 LBS | HEART RATE: 86 BPM | RESPIRATION RATE: 18 BRPM | TEMPERATURE: 98.8 F | DIASTOLIC BLOOD PRESSURE: 82 MMHG

## 2018-12-07 DIAGNOSIS — J06.9 VIRAL URI WITH COUGH: ICD-10-CM

## 2018-12-07 DIAGNOSIS — J02.9 ACUTE PHARYNGITIS, UNSPECIFIED ETIOLOGY: Primary | ICD-10-CM

## 2018-12-07 DIAGNOSIS — S90.221A TOENAIL BRUISE, RIGHT, INITIAL ENCOUNTER: ICD-10-CM

## 2018-12-07 LAB
S PYO AG THROAT QL: NEGATIVE
VALID INTERNAL CONTROL?: YES

## 2018-12-07 RX ORDER — MELATONIN
DAILY
COMMUNITY

## 2018-12-07 NOTE — PROGRESS NOTES
45 yo female with sore throat and cough productive of green mucus for 36 hrs. She works at [employer] where an Adenovirus is prevalent currently. She has taken Dayquil and cough drops. She is also on Flonase & Allegra, but has stopped using Singulair. She reports an area of darkness under R great toenail for 3-4 mos. No tenderness. Does not recall trauma to the area. Father has had some facial skin lesions removed. No knowledge of FH melanoma. She has never been a regular sunbather. PE: Overweight WF   Skin - 3mm x 4mm area of dark discoloration medial aspect of R Great Toe   T - 98.8   Phar - red   Neck - no nodes   Ears - R TM - normal; L canal w/ large piece of cerumen   Lungs - clear  Rapid Strep: NEG    Imp: Viral URI   Skin lesion under R Great Toenail    Plan: NSAIDs   Mucinex, saline NS   Continue Flonase and Allegra   Hydration   She will monitor the toenail dark spot and report any change  _______________________  Expected course of current diagnosed problem(s) as well as expected progression and possible complications, and desired follow up with provider are discussed with patient. Patient is encouraged to be back in touch with any questions or concerns. Patient expresses understanding of plan of care. Patient is given AVS which includes diagnoses, current medications, vitals.

## 2018-12-07 NOTE — PATIENT INSTRUCTIONS
1. Mucinex (Guaifenesen) plain-Blue Box 600 mg. Take one twice daily with full glass water   Take for 10 days    2. Saline Nasal Spray - use liberally to flush post-nasal area; use as many times a day as desired. Keep spraying with head tilted back until you feel need to swallow    3. Drink lots of fluids (mainly water) to keep mucus thinner    4. If needed, for cough, we recommend Delsym cough syrup    5. Long acting antihistamine (Allegra/Fexofenadine or Zyrtec/Ceterizine) is useful if allergy symptoms are also present    6. Decongestants should not be used as they actually contribute to overdrying/thickening of the mucus, and can raise the BP and overstimulate the heart    7. Steroid nasal spray (Nasacort or Flonase) - 2 sprays each nostril once daily; use with head in upright position    8.  Advil or Aleve as needed for aching, sore throat or feverishness  ___________________________________    Monitor dark spot under your Right Great Toenail and report any change

## 2018-12-07 NOTE — PROGRESS NOTES
Reviewed record in preparation for visit and have obtained necessary documentation. Identified pt with two pt identifiers(name and ). Health Maintenance Due   Topic    PAP AKA CERVICAL CYTOLOGY          Chief Complaint   Patient presents with    Sore Throat     Patient states apox 1 day and 1/2 that she has had sore throat  and cough with greenish yellow moucos and body aches. Wt Readings from Last 3 Encounters:   18 190 lb (86.2 kg)   18 202 lb (91.6 kg)   18 194 lb (88 kg)     Temp Readings from Last 3 Encounters:   18 98.1 °F (36.7 °C) (Oral)   18 97.6 °F (36.4 °C) (Oral)   17 97.8 °F (36.6 °C) (Oral)     BP Readings from Last 3 Encounters:   18 110/80   18 142/84   17 110/62     Pulse Readings from Last 3 Encounters:   18 84   18 82   17 65           Learning Assessment:  :     Learning Assessment 2018 3/6/2015 2014   PRIMARY LEARNER Patient Patient Patient   HIGHEST LEVEL OF EDUCATION - PRIMARY LEARNER  > 4 YEARS OF COLLEGE > 4 YEARS OF COLLEGE > 4 YEARS OF COLLEGE   BARRIERS PRIMARY LEARNER NONE NONE NONE   CO-LEARNER CAREGIVER No No No   PRIMARY LANGUAGE ENGLISH ENGLISH ENGLISH    NEED - No No   LEARNER PREFERENCE PRIMARY READING DEMONSTRATION READING     - READING LISTENING     - - DEMONSTRATION   LEARNING SPECIAL TOPICS - no no   ANSWERED BY patient patient patient   RELATIONSHIP SELF SELF SELF   ASSESSMENT COMMENT - none -       Depression Screening:  :     PHQ over the last two weeks 2018   Little interest or pleasure in doing things Not at all   Feeling down, depressed, irritable, or hopeless Not at all   Total Score PHQ 2 0       Fall Risk Assessment:  :     No flowsheet data found. Abuse Screening:  :     Abuse Screening Questionnaire 2018 3/6/2015 2014   Do you ever feel afraid of your partner?  N N N   Are you in a relationship with someone who physically or mentally threatens you? N N N   Is it safe for you to go home? Ene Nguyen       Coordination of Care Questionnaire:  :     1) Have you been to an emergency room, urgent care clinic since your last visit? no   Hospitalized since your last visit? no             2) Have you seen or consulted any other health care providers outside of [address] [place] [address] since your last visit? yes  [facility] 11/[facility](Include any pap smears or colon screenings in this section.)    3) Do you have an Advance Directive on file? no    4) Are you interested in receiving information on Advance Directives? NO      Patient is accompanied by self I have received verbal consent from Charis Mayfield to discuss any/all medical information while they are present in the room.

## 2018-12-26 ENCOUNTER — OFFICE VISIT (OUTPATIENT)
Dept: INTERNAL MEDICINE CLINIC | Age: 43
End: 2018-12-26

## 2018-12-26 VITALS
SYSTOLIC BLOOD PRESSURE: 128 MMHG | BODY MASS INDEX: 35.3 KG/M2 | WEIGHT: 191.8 LBS | OXYGEN SATURATION: 97 % | RESPIRATION RATE: 16 BRPM | TEMPERATURE: 98.1 F | DIASTOLIC BLOOD PRESSURE: 90 MMHG | HEART RATE: 77 BPM | HEIGHT: 62 IN

## 2018-12-26 DIAGNOSIS — H61.22 IMPACTED CERUMEN OF LEFT EAR: Primary | ICD-10-CM

## 2018-12-26 NOTE — PROGRESS NOTES
Ashwin Zhou is a 44 y.o. female      Chief Complaint   Patient presents with    Ear Pain     left ear discomfort x4days (worst yesterday)     1. Have you been to the ER, urgent care clinic since your last visit? Hospitalized since your last visit? No    2. Have you seen or consulted any other health care providers outside of the [address] [place] [address] since your last visit? Include any pap smears or colon screening.  No      Health Maintenance Due   Topic Date Due    PAP AKA CERVICAL CYTOLOGY  11/03/2018     Visit Vitals  /90 (BP 1 Location: Right arm, BP Patient Position: Sitting)   Pulse 77   Temp 98.1 °F (36.7 °C) (Oral)   Resp 16   Ht 5' 2\" (1.575 m)   Wt 191 lb 12.8 oz (87 kg)   SpO2 97%   BMI 35.08 kg/m²

## 2018-12-26 NOTE — PROGRESS NOTES
45 yo female reports 4 days of ear pain which worsened yesterday. No preceding URI sxs. She was seen early this month for a Viral URI. She had cerumen in the ear at last visit, and she tried to use some cerumen softener, but didn't try to flush it out. PE: Overweight WF   T - 98.1   Ears -  R canal w/ small amount cerumen; TM is dull    L canal blocked by impacted cerumen    Imp: Impacted Cerumen L ear    Plan: Mod amount cerumen removed on flushing - successful removal w/ visualization of TM and immediate relief from the pressure  ______________________________  Expected course of current diagnosed problem(s) as well as expected progression and possible complications, and desired follow up with provider are discussed with patient. Patient is encouraged to be back in touch with any questions or concerns. Patient expresses understanding of plan of care. Patient is given AVS which includes diagnoses, current medications, vitals.

## 2019-11-04 DIAGNOSIS — F32.9 REACTIVE DEPRESSION (SITUATIONAL): ICD-10-CM

## 2019-11-04 RX ORDER — SERTRALINE HYDROCHLORIDE 100 MG/1
TABLET, FILM COATED ORAL
Qty: 90 TAB | Refills: 3 | OUTPATIENT
Start: 2019-11-04

## 2019-11-04 NOTE — TELEPHONE ENCOUNTER
Last refill- 11/15/18  Last office visit - 6/27/18  Next office visit - No future appointments. Advised patient to schedule with a new PCP.       Requested Prescriptions     Pending Prescriptions Disp Refills    sertraline (ZOLOFT) 100 mg tablet 90 Tab 3     Sig: TAKE 1 TABLET BY MOUTH EVERY DAY

## 2019-11-18 ENCOUNTER — HOSPITAL ENCOUNTER (OUTPATIENT)
Dept: GENERAL RADIOLOGY | Age: 44
Discharge: HOME | End: 2019-11-18
Payer: COMMERCIAL

## 2019-11-18 ENCOUNTER — OFFICE VISIT (OUTPATIENT)
Dept: FAMILY MEDICINE CLINIC | Age: 44
End: 2019-11-18

## 2019-11-18 VITALS
TEMPERATURE: 98 F | SYSTOLIC BLOOD PRESSURE: 118 MMHG | DIASTOLIC BLOOD PRESSURE: 74 MMHG | WEIGHT: 205 LBS | OXYGEN SATURATION: 98 % | HEIGHT: 62 IN | BODY MASS INDEX: 37.73 KG/M2 | RESPIRATION RATE: 17 BRPM | HEART RATE: 98 BPM

## 2019-11-18 DIAGNOSIS — R05.9 COUGH: Primary | ICD-10-CM

## 2019-11-18 DIAGNOSIS — F41.9 ANXIETY AND DEPRESSION: ICD-10-CM

## 2019-11-18 DIAGNOSIS — M79.641 RIGHT HAND PAIN: ICD-10-CM

## 2019-11-18 DIAGNOSIS — F32.A ANXIETY AND DEPRESSION: ICD-10-CM

## 2019-11-18 DIAGNOSIS — Z13.220 SCREENING FOR HYPERLIPIDEMIA: ICD-10-CM

## 2019-11-18 DIAGNOSIS — R53.83 FATIGUE, UNSPECIFIED TYPE: ICD-10-CM

## 2019-11-18 PROCEDURE — 73130 X-RAY EXAM OF HAND: CPT

## 2019-11-18 RX ORDER — BENZONATATE 100 MG/1
100 CAPSULE ORAL
Qty: 24 CAP | Refills: 0 | Status: SHIPPED | OUTPATIENT
Start: 2019-11-18 | End: 2019-11-25 | Stop reason: ALTCHOICE

## 2019-11-18 RX ORDER — NORETHINDRONE ACETATE AND ETHINYL ESTRADIOL, ETHINYL ESTRADIOL AND FERROUS FUMARATE 1MG-10(24)
KIT ORAL
Refills: 4 | COMMUNITY
Start: 2019-10-19

## 2019-11-18 RX ORDER — SERTRALINE HYDROCHLORIDE 100 MG/1
TABLET, FILM COATED ORAL
Qty: 90 TAB | Refills: 1 | Status: SHIPPED | OUTPATIENT
Start: 2019-11-18 | End: 2020-02-04 | Stop reason: SDUPTHER

## 2019-11-18 NOTE — PATIENT INSTRUCTIONS
Cough: Care Instructions  Your Care Instructions    A cough is your body's response to something that bothers your throat or airways. Many things can cause a cough. You might cough because of a cold or the flu, bronchitis, or asthma. Smoking, postnasal drip, allergies, and stomach acid that backs up into your throat also can cause coughs. A cough is a symptom, not a disease. Most coughs stop when the cause, such as a cold, goes away. You can take a few steps at home to cough less and feel better. Follow-up care is a key part of your treatment and safety. Be sure to make and go to all appointments, and call your doctor if you are having problems. It's also a good idea to know your test results and keep a list of the medicines you take. How can you care for yourself at home? · Drink lots of water and other fluids. This helps thin the mucus and soothes a dry or sore throat. Honey or lemon juice in hot water or tea may ease a dry cough. · Take cough medicine as directed by your doctor. · Prop up your head on pillows to help you breathe and ease a dry cough. · Try cough drops to soothe a dry or sore throat. Cough drops don't stop a cough. Medicine-flavored cough drops are no better than candy-flavored drops or hard candy. · Do not smoke. Avoid secondhand smoke. If you need help quitting, talk to your doctor about stop-smoking programs and medicines. These can increase your chances of quitting for good. When should you call for help? Call 911 anytime you think you may need emergency care.  For example, call if:    · You have severe trouble breathing.    Call your doctor now or seek immediate medical care if:    · You cough up blood.     · You have new or worse trouble breathing.     · You have a new or higher fever.     · You have a new rash.    Watch closely for changes in your health, and be sure to contact your doctor if:    · You cough more deeply or more often, especially if you notice more mucus or a change in the color of your mucus.     · You have new symptoms, such as a sore throat, an earache, or sinus pain.     · You do not get better as expected. Where can you learn more? Go to http://go-juani.info/. Enter D279 in the search box to learn more about \"Cough: Care Instructions. \"  Current as of: June 9, 2019  Content Version: 12.2  © 7162-3575 [employer]. Care instructions adapted under license by [employer] (which disclaims liability or warranty for this information). If you have questions about a medical condition or this instruction, always ask your healthcare professional. [address] any warranty or liability for your use of this information. Hand Pain: Care Instructions  Your Care Instructions    Common causes of hand pain are overuse and injuries, such as might happen during sports or home repair projects. Everyday wear and tear, especially as you get older, also can cause hand pain. Most minor hand injuries will heal on their own, and home treatment is usually all you need to do. If you have sudden and severe pain, you may need tests and treatment. Follow-up care is a key part of your treatment and safety. Be sure to make and go to all appointments, and call your doctor if you are having problems. It's also a good idea to know your test results and keep a list of the medicines you take. How can you care for yourself at home? · Take pain medicines exactly as directed. ? If the doctor gave you a prescription medicine for pain, take it as prescribed. ? If you are not taking a prescription pain medicine, ask your doctor if you can take an over-the-counter medicine. · Rest and protect your hand. Take a break from any activity that may cause pain. · Put ice or a cold pack on your hand for 10 to 20 minutes at a time. Put a thin cloth between the ice and your skin.   · Prop up the sore hand on a pillow when you ice it or anytime you sit or lie down during the next 3 days. Try to keep it above the level of your heart. This will help reduce swelling. · If your doctor recommends a sling, splint, or elastic bandage to support your hand, wear it as directed. When should you call for help? Call 911 anytime you think you may need emergency care. For example, call if:    · Your hand turns cool or pale or changes color.    Call your doctor now or seek immediate medical care if:    · You cannot move your hand.     · Your hand pops, moves out of its normal position, and then returns to its normal position.     · You have signs of infection, such as:  ? Increased pain, swelling, warmth, or redness. ? Red streaks leading from the sore area. ? Pus draining from a place on your hand. ? A fever.     · Your hand feels numb or tingly.    Watch closely for changes in your health, and be sure to contact your doctor if:    · Your hand feels unstable when you try to use it.     · You do not get better as expected.     · You have any new symptoms, such as swelling.     · Bruises from an injury to your hand last longer than 2 weeks. Where can you learn more? Go to http://go-juani.info/. Enter R273 in the search box to learn more about \"Hand Pain: Care Instructions. \"  Current as of: June 26, 2019  Content Version: 12.2  © 0487-1772 [employer]. Care instructions adapted under license by [employer] (which disclaims liability or warranty for this information). If you have questions about a medical condition or this instruction, always ask your healthcare professional. [place] [address] any warranty or liability for your use of this information.

## 2019-11-18 NOTE — PROGRESS NOTES
Chief Complaint   Patient presents with   [facility]    Joint Pain     left thumb x 2 -3 weeks     Flu Like Symptoms     x 2 days     1. Have you been to the ER, urgent care clinic since your last visit? Hospitalized since your last visit? No    2. Have you seen or consulted any other health care providers outside of the [address] [place] [address] since your last visit? Include any pap smears or colon screening.  No

## 2019-11-18 NOTE — PROGRESS NOTES
Stephie Carrillo  38 y.o. female  1975  [address]   [id number]     [facility]       Encounter Date: 11/18/2019           New Patient Visit Note: Deangelo Begum MD    Previous PCP: Dr. Brandon Jorge at [place] [address]    Reason for Appointment:  Chief Complaint   Patient presents with   [facility] Establ[facility] Care    Joint Pain     left thumb x 2 -3 weeks     Flu Like Symptoms     x 2 days       History of Present Illness:  History provided by patient    Stephie Carrillo is a 38 y.o. female who presents to clinic today for:    URI Symptoms  Duration: 3 days  Symptoms: cough productive of yellow sputum, right frontal headache, waking up with crusty right eye  Sick Contacts: adenovirus on campus  Recent Travel: none  Medicines: robitussin dm, cough drops  AS: denies fevers/chills, denies muscle aches, Denies pain in throat, endorses feelling a little run down    Anxiety and Depression  Duration: diagnosed around 2015  Inciting Factors: during original diagnosis, patient was dealing with a difficult supervisor  Medicine: Zoloft 50mg  Symptoms: poor mood, poor sleep, feeling sad and angry  Suicide: denies any present or past attempts of suicide  Counseling: Galina Dumas 1.5 years ago    Joint Pain  Location:  Right hand thumb pain, endorse  Duration: 3-4 weeks  Severity: 3/10 presently but 7/10 at its worst  Character: dully, throbbing but can radiate up arm when moving the wrong way  Hand Dominance: left handed  Medicines: taking alieve PRN  MF: worse with doing yoga and placing hand flat on floor, worse with cold weather  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     Medications: (Updated to reflect final medication list after visit)    Current Outpatient Medications:     LO LOESTRIN FE 1 mg-10 mcg (24)/10 mcg (2) tab, TAKE 1 TABLET BY ORAL ROUTE ONCE DAILY, Disp: , Rfl: 4    benzonatate (TESSALON) 100 mg capsule, Take 1 Cap by mouth three (3) times daily as needed for Cough for up to 7 days. , Disp: 24 Cap, Rfl: 0    sertraline (ZOLOFT) 100 mg tablet, TAKE 1 TABLET BY MOUTH EVERY DAY, Disp: 90 Tab, Rfl: 1    cholecalciferol (VITAMIN D3) 1,000 unit tablet, Take  by mouth daily. , Disp: , Rfl:     cpap machine kit, by Does Not Apply route., Disp: , Rfl:     SUMAtriptan (IMITREX) 50 mg tablet, Take 1 Tab by mouth once as needed for Migraine (may repeat in 2 hours after initial dose) for up to 1 dose., Disp: 10 Tab, Rfl: 0    fluticasone (FLONASE) 50 mcg/actuation nasal spray, nightly., Disp: , Rfl:     multivitamin (ONE A DAY) tablet, Take 1 Tab by mouth daily. , Disp: , Rfl:     fexofenadine (ALLEGRA) 180 mg tablet, Take 180 mg by mouth daily. , Disp: , Rfl:     History  Past Medical History:   Diagnosis Date    Allergic rhinitis     Infertility     Dr. Shavonne Drummond Reactive depression (situational)     Situational stress     Sleep apnea 03/2018    Sleep apnea 2018    Vitamin D deficiency 2/2015      Past Surgical History:   Procedure Laterality Date    HX ORTHOPAEDIC  2010    ORIF L ankle     Family History   Problem Relation Age of Onset    Obesity Mother     Asthma Father     Heart Disease Father 76        afib    Asthma Brother     Macular Degen Brother 40     Social History     Tobacco Use    Smoking status: Never Smoker    Smokeless tobacco: Never Used   Substance Use Topics    Alcohol use:  Yes     Alcohol/week: 0.0 - 0.8 standard drinks   Drugs: denies  Occupation: work for [employer]: lives with  and 2cats, [facility] Due   Topic Date Due    PAP AKA CERVICAL CYTOLOGY  11/03/2018    followed by Juan Webb at [facility]    Objective:   Visit Vitals  /74   Pulse 98   Temp 98 °F (36.7 °C) (Oral)   Resp 17   Ht 5' 2\" (1.575 m)   Wt 205 lb (93 kg)   LMP 11/07/2019 (Approximate)   SpO2 98%   BMI 37.49 kg/m²       Physical Exam:  General appearance: NAD, conversant   Eyes: anicteric sclerae, moist conjunctivae; no lid-lag; PERRLA  HENT: Atraumatic; oropharynx clear with moist mucous membranes and no mucosal ulcerations; normal hard and soft palate  Neck: Trachea midline; FROM, supple, no thyromegaly or lymphadenopathy  Lungs: CTA, with normal respiratory effort and no intercostal retractions  CV: Regular rate, normal rhythm, no JVD, no MRGs   Abdomen: Soft, non-tender; no masses or HSM  Extremities: No peripheral edema or extremity lymphadenopathy  Skin: Normal temperature, turgor and texture; no rash, ulcers or subcutaneous nodules  Neuro: CN 2-12 grossly intact. DTRs 2+ and symmetrical throughout. Stable gait. Psych: Appropriate affect, alert and oriented to person, place and time  MSK: Hand  Inspection: no deformity or discoloration of left/right hand and wrist  Palpation: TTP over thenar eminence of right hand  ROM: full and symeetric in all fingers and wrist movement  Strength: full and symmetric in all fingers and wrists  Negative Tinel and negative Phalen's test    Assessment & Plan:  1. Cough: discussed diagnosis & treatment options, most likely viral at this time, reviewed the importance of avoiding unnecessary antibiotic therapy, Yes, sinus rinses, humidifier.  -Reviewed signs and symptoms that would indicate a worsening medical condition which would require immediate evaluation and treatment; patient expressed understanding of plan. - benzonatate (TESSALON) 100 mg capsule; Take 1 Cap by mouth three (3) times daily as needed for Cough for up to 7 days. Dispense: 24 Cap; Refill: 0    2. Fatigue, unspecified type: Chronic, stable. Will evaluate further with labs  - TSH 3RD GENERATION  - CBC WITH AUTOMATED DIFF  - VITAMIN D, 25 HYDROXY    3. Right hand pain: New problem.  Mild TTP over thenar eminence, but no other abnormality on exam today. Will evaluate further with xray. - XR HAND RT MIN 3 V; Future    4. Screening for hyperlipidemia  - LIPID PANEL  - METABOLIC PANEL, COMPREHENSIVE    5. Anxiety and depression: Chronic, stable. No SI. Continue current regimen  - sertraline (ZOLOFT) 100 mg tablet; TAKE 1 TABLET BY MOUTH EVERY DAY  Dispense: 90 Tab; Refill: 1          I have discussed the diagnosis with the patient and the intended plan as seen in the above orders. The patient has received an after-visit summary along with patient information handout. I have discussed medication side effects and warnings with the patient as well. Dispostion  Follow-up and Dispositions    · Return in about 1 week (around 11/25/2019).            Erum Lanza MD

## 2019-11-19 LAB
25(OH)D3+25(OH)D2 SERPL-MCNC: 35.2 NG/ML (ref 30–100)
ALBUMIN SERPL-MCNC: 4.2 G/DL (ref 3.5–5.5)
ALBUMIN/GLOB SERPL: 1.8 {RATIO} (ref 1.2–2.2)
ALP SERPL-CCNC: 75 IU/L (ref 39–117)
ALT SERPL-CCNC: 22 IU/L (ref 0–32)
AST SERPL-CCNC: 17 IU/L (ref 0–40)
BASOPHILS # BLD AUTO: 0 X10E3/UL (ref 0–0.2)
BASOPHILS NFR BLD AUTO: 0 %
BILIRUB SERPL-MCNC: 0.2 MG/DL (ref 0–1.2)
BUN SERPL-MCNC: 10 MG/DL (ref 6–24)
BUN/CREAT SERPL: 15 (ref 9–23)
CALCIUM SERPL-MCNC: 9.1 MG/DL (ref 8.7–10.2)
CHLORIDE SERPL-SCNC: 103 MMOL/L (ref 96–106)
CHOLEST SERPL-MCNC: 257 MG/DL (ref 100–199)
CO2 SERPL-SCNC: 23 MMOL/L (ref 20–29)
CREAT SERPL-MCNC: 0.67 MG/DL (ref 0.57–1)
EOSINOPHIL # BLD AUTO: 0.3 X10E3/UL (ref 0–0.4)
EOSINOPHIL NFR BLD AUTO: 3 %
ERYTHROCYTE [DISTWIDTH] IN BLOOD BY AUTOMATED COUNT: 12.5 % (ref 12.3–15.4)
GLOBULIN SER CALC-MCNC: 2.4 G/DL (ref 1.5–4.5)
GLUCOSE SERPL-MCNC: 115 MG/DL (ref 65–99)
HCT VFR BLD AUTO: 36.9 % (ref 34–46.6)
HDLC SERPL-MCNC: 41 MG/DL
HGB BLD-MCNC: 12.4 G/DL (ref 11.1–15.9)
IMM GRANULOCYTES # BLD AUTO: 0 X10E3/UL (ref 0–0.1)
IMM GRANULOCYTES NFR BLD AUTO: 0 %
INTERPRETATION, 910389: NORMAL
LDLC SERPL CALC-MCNC: 139 MG/DL (ref 0–99)
LYMPHOCYTES # BLD AUTO: 2.4 X10E3/UL (ref 0.7–3.1)
LYMPHOCYTES NFR BLD AUTO: 22 %
MCH RBC QN AUTO: 30 PG (ref 26.6–33)
MCHC RBC AUTO-ENTMCNC: 33.6 G/DL (ref 31.5–35.7)
MCV RBC AUTO: 89 FL (ref 79–97)
MONOCYTES # BLD AUTO: 0.8 X10E3/UL (ref 0.1–0.9)
MONOCYTES NFR BLD AUTO: 7 %
NEUTROPHILS # BLD AUTO: 7.5 X10E3/UL (ref 1.4–7)
NEUTROPHILS NFR BLD AUTO: 68 %
PLATELET # BLD AUTO: 273 X10E3/UL (ref 150–450)
POTASSIUM SERPL-SCNC: 4.4 MMOL/L (ref 3.5–5.2)
PROT SERPL-MCNC: 6.6 G/DL (ref 6–8.5)
RBC # BLD AUTO: 4.13 X10E6/UL (ref 3.77–5.28)
SODIUM SERPL-SCNC: 141 MMOL/L (ref 134–144)
TRIGL SERPL-MCNC: 387 MG/DL (ref 0–149)
TSH SERPL DL<=0.005 MIU/L-ACNC: 2.02 UIU/ML (ref 0.45–4.5)
VLDLC SERPL CALC-MCNC: 77 MG/DL (ref 5–40)
WBC # BLD AUTO: 11.1 X10E3/UL (ref 3.4–10.8)

## 2019-11-20 ENCOUNTER — TELEPHONE (OUTPATIENT)
Dept: FAMILY MEDICINE CLINIC | Age: 44
End: 2019-11-20

## 2019-11-20 NOTE — PROGRESS NOTES
TSH wnl. CBC with slightly elevated WBCs c/w recent URI. Chol remains elevated. CMP shows blood glucose a little elevated, but remainder of labs wnl. in clinic.

## 2019-11-25 ENCOUNTER — HOSPITAL ENCOUNTER (OUTPATIENT)
Dept: GENERAL RADIOLOGY | Age: 44
Discharge: HOME | End: 2019-11-25
Attending: FAMILY MEDICINE
Payer: COMMERCIAL

## 2019-11-25 ENCOUNTER — OFFICE VISIT (OUTPATIENT)
Dept: FAMILY MEDICINE CLINIC | Age: 44
End: 2019-11-25

## 2019-11-25 VITALS
WEIGHT: 205.6 LBS | DIASTOLIC BLOOD PRESSURE: 62 MMHG | SYSTOLIC BLOOD PRESSURE: 100 MMHG | TEMPERATURE: 98.6 F | HEIGHT: 62 IN | HEART RATE: 88 BPM | RESPIRATION RATE: 18 BRPM | OXYGEN SATURATION: 97 % | BODY MASS INDEX: 37.84 KG/M2

## 2019-11-25 DIAGNOSIS — G89.29 CHRONIC MIDLINE LOW BACK PAIN WITHOUT SCIATICA: Primary | ICD-10-CM

## 2019-11-25 DIAGNOSIS — M79.641 RIGHT HAND PAIN: ICD-10-CM

## 2019-11-25 DIAGNOSIS — M54.50 CHRONIC MIDLINE LOW BACK PAIN WITHOUT SCIATICA: ICD-10-CM

## 2019-11-25 DIAGNOSIS — R73.01 ELEVATED FASTING BLOOD SUGAR: ICD-10-CM

## 2019-11-25 DIAGNOSIS — G89.29 CHRONIC MIDLINE LOW BACK PAIN WITHOUT SCIATICA: ICD-10-CM

## 2019-11-25 DIAGNOSIS — M54.50 CHRONIC MIDLINE LOW BACK PAIN WITHOUT SCIATICA: Primary | ICD-10-CM

## 2019-11-25 PROBLEM — F32.A ANXIETY AND DEPRESSION: Status: ACTIVE | Noted: 2019-11-25

## 2019-11-25 PROBLEM — F41.9 ANXIETY AND DEPRESSION: Status: ACTIVE | Noted: 2019-11-25

## 2019-11-25 LAB — HBA1C MFR BLD HPLC: 5.5 %

## 2019-11-25 PROCEDURE — 72100 X-RAY EXAM L-S SPINE 2/3 VWS: CPT

## 2019-11-25 NOTE — PROGRESS NOTES
Ac Charles  38 y.o. female  1975  [address] [address]  [id number]     [address] [place] [address]       Encounter Date: 11/25/2019           Established tPatient Visit Note: Liana Oreilly MD      Reason for Appointment:  Chief Complaint   Patient presents with    Cough     cough is better, still fatigued with body aches    Hand Pain     right hand pain, follow up with xray results from 11/18/19       History of Present Illness:  History provided by patient    Ac Charles is a 38 y.o. female who presents to clinic today for:    Cough  See hx from visit on 11/19/19  Interval History: today patient reports that her cough has improved and she feels that she is getting better. She denies any new or worsening symptoms. 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 Medications: (Updated to reflect final medication list after visit)    Current Outpatient Medications:     LO LOESTRIN FE 1 mg-10 mcg (24)/10 mcg (2) tab, TAKE 1 TABLET BY ORAL ROUTE ONCE DAILY, Disp: , Rfl: 4    sertraline (ZOLOFT) 100 mg tablet, TAKE 1 TABLET BY MOUTH EVERY DAY, Disp: 90 Tab, Rfl: 1    cholecalciferol (VITAMIN D3) 1,000 unit tablet, Take  by mouth daily. , Disp: , Rfl:     cpap machine kit, by Does Not Apply route., Disp: , Rfl:     SUMAtriptan (IMITREX) 50 mg tablet, Take 1 Tab by mouth once as needed for Migraine (may repeat in 2 hours after initial dose) for up to 1 dose., Disp: 10 Tab, Rfl: 0    fluticasone (FLONASE) 50 mcg/actuation nasal spray, nightly., Disp: , Rfl:     multivitamin (ONE A DAY) tablet, Take 1 Tab by mouth daily. , Disp: , Rfl:     fexofenadine (ALLEGRA) 180 mg tablet, Take 180 mg by mouth daily. , Disp: , Rfl:     History  Patient Care Team:  Mely Bryson MD as PCP - General (Family Practice)  [facility]  Vlad La MD (Endocrinology)  Norris Rizzo MD as Physician (Obstetrics & Gynecology)    Past Medical History: she has a past medical history of Allergic rhinitis, Infertility, Reactive depression (situational), Situational stress, and Vitamin D deficiency (2/2015). Past Surgical History: she has a past surgical history that includes hx orthopaedic (2010). Family Medical History: family history includes Asthma in her brother and father; Heart Disease (age of onset: 76) in her father; Obdulio Shubham (age of onset: 40) in her brother; Obesity in her mother. Social History: she reports that she has never smoked.  She has never used smokeless tobacco. She reports current alcohol use. She reports that she does not use drugs. Health Maintenance Due   Topic Date Due    PAP AKA CERVICAL CYTOLOGY  11/03/2018       Objective:   Visit Vitals  /62 (BP 1 Location: Right arm, BP Patient Position: Sitting)   Pulse 88   Temp 98.6 °F (37 °C) (Oral)   Resp 18   Ht 5' 2\" (1.575 m)   Wt 205 lb 9.6 oz (93.3 kg)   LMP 11/07/2019 (Exact Date)   SpO2 97%   BMI 37.60 kg/m²     Wt Readings from Last 3 Encounters:   11/25/19 205 lb 9.6 oz (93.3 kg)   11/18/19 205 lb (93 kg)   12/26/18 191 lb 12.8 oz (87 kg)       Physical Exam  Vitals signs and nursing note reviewed. Constitutional:       General: She is not in acute distress. Appearance: Normal appearance. HENT:      Head: Normocephalic and atraumatic. Nose: Nose normal.      Mouth/Throat:      Mouth: Mucous membranes are moist.   Eyes:      Extraocular Movements: Extraocular movements intact. Conjunctiva/sclera: Conjunctivae normal.      Pupils: Pupils are equal, round, and reactive to light. Neck:      Musculoskeletal: Normal range of motion and neck supple. No neck rigidity or muscular tenderness. Cardiovascular:      Rate and Rhythm: Normal rate and regular rhythm. Pulses: Normal pulses. Heart sounds: Normal heart sounds. No murmur. No friction rub. No gallop. Pulmonary:      Effort: Pulmonary effort is normal. No respiratory distress. Breath sounds: Normal breath sounds. No wheezing, rhonchi or rales. Musculoskeletal:      Comments: Right hand MSK Exam: no change from visit on 11/19/19  MSK: Back Exam  Inspection: no deformity or discoloration  Palpation: mild TTP over left and right lower lumbar spine and paraspinal muscles  ROM:  extension and lateral flexion wnl  SLR: negative for sciatica or reproduction of pain on left/right     Lymphadenopathy:      Cervical: No cervical adenopathy. Skin:     General: Skin is warm. Coloration: Skin is not jaundiced.    Neurological: General: No focal deficit present. Mental Status: She is alert and oriented to person, place, and time. Mental status is at baseline. Cranial Nerves: Cranial nerves are intact. Motor: Motor function is intact. Coordination: Coordination is intact. Psychiatric:         Mood and Affect: Mood normal.         Behavior: Behavior normal.         Thought Content: Thought content normal.         Judgment: Judgment normal.       Lab Results   Component Value Date/Time    Hemoglobin A1c (POC) 5.5 11/25/2019 03:58 PM       Assessment & Plan:    Diagnoses and all orders for this visit:    1. Chronic midline low back pain without sciatica  Assessment & Plan:  Chronic, worsening. No red flag s/s on exam today. Will evaluate further with xray and refer to PT for further evaluation  - obtain Xray  - referral to PT  - discussed red flag symptoms and reasons to call or go to ED    Orders:  -     XR SPINE LUMB 2 OR 3 V; Future  -     REFERRAL TO PHYSICAL THERAPY    2. Right hand pain  Assessment & Plan:  F/u of new problem; Xray did not show any bony abnormality. Will provide referral to orthopedics for further evaluation  - referral to orthopedics  - avoid activities that worsen symptoms  - PRN tylenol, ibuprofen    Orders:  -     REFERRAL TO ORTHOPEDICS    3. Elevated fasting blood sugar  Assessment & Plan:  Chronic, stable. Patient is just below the threshold for prediabtes  - continue to monitor A1c  - discussed diet and exercise recommendations to prevent onset of prediabtes    Orders:  -     AMB POC HEMOGLOBIN A1C      I have discussed the diagnosis with the patient and the intended plan as seen in the above orders. The patient has received an after-visit summary along with patient information handout. I have discussed medication side effects and warnings with the patient as well. Dispostion  Follow-up and Dispositions    · Return in about 1 month (around 12/25/2019) for low back pain.            [facility] Vibha Torres MD

## 2019-11-26 PROBLEM — R73.01 ELEVATED FASTING BLOOD SUGAR: Status: ACTIVE | Noted: 2019-11-26

## 2019-11-26 NOTE — ASSESSMENT & PLAN NOTE
Chronic, stable  - continue to f/u with sleep medicine as scheduled  - continue CPAP
Chronic, stable with no SI or worsening symptoms  - continue Zoloft  - discussed red flag symptoms and reasons to call or go to ED
Chronic, stable.  Patient is just below the threshold for prediabtes  - continue to monitor A1c  - discussed diet and exercise recommendations to prevent onset of prediabtes
Chronic, worsening. No red flag s/s on exam today.  Will evaluate further with xray and refer to PT for further evaluation  - obtain Xray  - referral to PT  - discussed red flag symptoms and reasons to call or go to ED
F/u of new problem; Xray did not show any bony abnormality.  Will provide referral to orthopedics for further evaluation  - referral to orthopedics  - avoid activities that worsen symptoms  - PRN tylenol, ibuprofen
32 y/o female with PMHX of DM with Insulin Pump, HTN, Diabetic Neuropathy, GERD, Degenerative Disc Disorder, presenting for abnormal nuclear stress test. Pt was sent to for outpatient Nuclear stress test, began to have chest pain with radiation to the B/L shoulder, down the back and the B/L legs and SOB. Pt's stress test positive. Pt sent to the ED to be evaluated by cardiology. Pt states that she was initally sent for Nuclear Stress test because she has been having intermittent chest pain for the past month. Pain is squeezing sensation, located underneath B/L breast radiating up midsternal.

## 2019-12-02 ENCOUNTER — HOSPITAL ENCOUNTER (OUTPATIENT)
Dept: PHYSICAL THERAPY | Age: 44
Discharge: HOME | End: 2019-12-02
Payer: COMMERCIAL

## 2019-12-02 PROCEDURE — 97110 THERAPEUTIC EXERCISES: CPT | Performed by: PHYSICAL THERAPY ASSISTANT

## 2019-12-02 PROCEDURE — 97014 ELECTRIC STIMULATION THERAPY: CPT | Performed by: PHYSICAL THERAPY ASSISTANT

## 2019-12-02 PROCEDURE — 97161 PT EVAL LOW COMPLEX 20 MIN: CPT | Performed by: PHYSICAL THERAPY ASSISTANT

## 2019-12-02 NOTE — PROGRESS NOTES
PT INITIAL EVALUATION NOTE - [facility] 2-15    Patient Name: Shanda Gottlieb  Date:2019  : 1975  [x]  Patient  Verified  Payor: Catrachita Roberto / Plan: [employer] [address] [employer] HMO / Product Type: HMO /    In time:5:05 pm  Out time:5:55 pm  Total Treatment Time (min): 50  Total Timed Codes (min): 15  1:1 Treatment Time ( [address] only): 15   Visit #: 1     Treatment Area: Low back pain [M54.5]    SUBJECTIVE  Pain Level (0-10 scale): 6  Any medication changes, allergies to medications, adverse drug reactions, diagnosis change, or new procedure performed?: [] No    [x] Yes (see summary sheet for update)  Subjective:    Pt complains of low back pain that started in 2017 w/ insidious onset. Pt reports it is equal on both sides and does not radiate. Pt has pain when standing for over 15 minutes. Pt sits at desk for work and has no pain when lying down or sitting. Pt is trying to lose weight but has pain when walking for over 20 minutes and pain w/ some yoga positions. Pt received x-ray that was negative. PLOF: yoga, dancing  Mechanism of Injury: insidious  Previous Treatment/Compliance: none  PMHx/Surgical Hx: see chart  Work Hx: desk job  Living Situation: w/   Pt Goals: \"no pain when standing and walking\"  Barriers: obesity  Motivation: good  Substance use: none  FABQ Score: see FOTO  Cognition: A & O x 4        OBJECTIVE/EXAMINATION  Posture:   Forward head and rounded shoulders  Other Observations:  obese  Gait and Functional Mobility:  normal  Palpation: TTP over lumbar paraspinals, multifidus    Lumbar ROM: flexion to 6\" off floor, extension full, SB and rotation full but painful at end ranges    LOWER QUARTER   MUSCLE STRENGTH  KEY       R  L  0 - No Contraction  L1, L2 Psoas  4-  4-  1 - Trace   L3 Quads  4  4  2 - Poor   L4 Tib Ant  5  5  3 - Fair    L5 EHL  5  5  4 - Good   S1 FHL  5  5  5 - Normal   S2 Hams  4  4            MMT: pain w/ hip flexion  Neurological: Reflexes / Sensations: normal  Special Tests: - slump, - SLR, + prone instability test       Modality rationale: decrease pain, increase tissue extensibility and increase muscle contraction/control to improve the patients ability to ambulate   Min Type Additional Details   15 [x] Estim: []Att   [x]Unatt        []TENS instruct                  [x]IFC  []Premod   []NMES                     []Other:  []w/US   []w/ice   [x]w/heat  Position: prone  Location: lumbar paraspinals    []  Traction: [] Cervical       []Lumbar                       [] Prone          []Supine                       []Intermittent   []Continuous Lbs:  [] before manual  [] after manual  []w/heat    []  Ultrasound: []Continuous   [] Pulsed at:                           []1MHz   []3MHz Location:  W/cm2:    [] Paraffin         Location:   []w/heat    []  Ice     []  Heat  []  Ice massage Position:  Location:    []  Laser  []  Other: Position:  Location:      []  Vasopneumatic Device Pressure:       [] lo [] med [] hi   Temperature:      [x] Skin assessment post-treatment:  [x]intact []redness- no adverse reaction    []redness  adverse reaction:     15 min Therapeutic Exercise:  [x] See flow sheet :   Rationale: increase ROM, increase strength and improve coordination to improve the patients ability to ambulate            With   [x] TE   [] TA   [] neuro   [] other: Patient Education: [x] Review HEP    [] Progressed/Changed HEP based on:   [x] positioning   [] body mechanics   [] transfers   [x] heat/ice application    [x] other:work modifications      Other Objective/Functional Measures: NT    Pain Level (0-10 scale) post treatment: 2    ASSESSMENT/Changes in Function:     [x]  See Plan of Care      Caterina Elizondo, PT, DPT, OCS 12/2/2019

## 2019-12-02 NOTE — PROGRESS NOTES
[facility] Physical Therapy  [address]  [place], [address]  Phone: [phone]  Fax: [phone]    Plan of Care/Statement of Necessity for Physical Therapy Services  2-15    Patient name: Kimmy Real  : 1975  Provider#: [id number]  Referral source: Tommy Rome MD      Medical/Treatment Diagnosis: Low back pain [M54.5]     Prior Hospitalization: see medical history     Comorbidities: see chart  Prior Level of Function: see chart  Medications: Verified on Patient Summary List    Start of Care: 19      Onset Date:        The Plan of Care and following information is based on the information from the initial evaluation. Assessment/ key information: Pt has signs and symptoms of chronic low back pain due to core weakness and obesity that affects her ability to ambulate, workout, perform work duties, and lose weight. Pt is a good candidate for therapy and may benefit from dry needling in order to address soft tissue restrictions. Problem List: pain affecting function, decrease ROM, decrease strength, edema affecting function, impaired gait/ balance, decrease ADL/ functional abilitiies, decrease activity tolerance and decrease flexibility/ joint mobility   Treatment Plan may include any combination of the following: Therapeutic exercise, Therapeutic activities, Neuromuscular re-education, Physical agent/modality, Manual therapy and Patient education  Patient / Family readiness to learn indicated by: asking questions  Persons(s) to be included in education: patient (P)  Barriers to Learning/Limitations: None  Patient Goal (s): no pain when walking  Patient Self Reported Health Status: good  Rehabilitation Potential: good    Short Term Goals: To be accomplished in 2 treatments:  Pt will be I w/ HEP  Pt will demonstrate proper sitting posture for over 20 minutes  Pt will report 50% decrease in pain at rest   Long Term Goals:  To be accomplished in 12 treatments:  Pt will report over 10 point improvement on FOTO  Pt will demonstrate full lumbar ROM w/o pain  Pt will be able to stand for over 20 minutes w/o pain  Frequency / Duration: Patient to be seen 1-2 times per week for 6-8 weeks. Patient/ Caregiver education and instruction: self care, activity modification and exercises    [x]  Plan of care has been reviewed with LARRY Elizondo, PT, DPT, OCS 12/2/2019   ________________________________________________________________________    I certify that the above Therapy Services are being furnished while the patient is under my care. I agree with the treatment plan and certify that this therapy is necessary.     [de-identified] Signature:____________________  Date:____________Time: _________

## 2019-12-12 ENCOUNTER — HOSPITAL ENCOUNTER (OUTPATIENT)
Dept: PHYSICAL THERAPY | Age: 44
Discharge: HOME | End: 2019-12-12
Payer: COMMERCIAL

## 2019-12-12 PROCEDURE — 97014 ELECTRIC STIMULATION THERAPY: CPT | Performed by: PHYSICAL THERAPY ASSISTANT

## 2019-12-12 PROCEDURE — 97110 THERAPEUTIC EXERCISES: CPT | Performed by: PHYSICAL THERAPY ASSISTANT

## 2019-12-12 PROCEDURE — 97140 MANUAL THERAPY 1/> REGIONS: CPT | Performed by: PHYSICAL THERAPY ASSISTANT

## 2019-12-12 NOTE — PROGRESS NOTES
PT DAILY TREATMENT NOTE - [facility] 2-15    Patient Name: Alejandro Minaya  Date:2019  : 1975  [x]  Patient  Verified  Payor: Aditi Root / Plan: [address] HMO / Product Type: HMO /    In time:3:55  Out time:4:00 pm  Total Treatment Time (min): 72  Visit #:  2    Treatment Area: Low back pain [M54.5]    SUBJECTIVE  Pain Level (0-10 scale): 3  Any medication changes, allergies to medications, adverse drug reactions, diagnosis change, or new procedure performed?: [x] No    [] Yes (see summary sheet for update)  Subjective functional status/changes:   [] No changes reported  Pt reports the home exercises have helped decrease her pain.     OBJECTIVE    Modality rationale: decrease pain, increase tissue extensibility and increase muscle contraction/control to improve the patients ability to ambulate and transfer   Min Type Additional Details      15 [x] Estim: []Att   [x]Unatt    []TENS instruct                  [x]IFC  []Premod   []NMES                     []Other:  []w/US   []w/ice   [x]w/heat  Position: prone  Location: lumbar paraspinals       []  Traction: [] Cervical       []Lumbar                       [] Prone          []Supine                       []Intermittent   []Continuous Lbs:  [] before manual  [] after manual  []w/heat    []  Ultrasound: []Continuous   [] Pulsed                       at: []1MHz   []3MHz Location:  W/cm2:    [] Paraffin         Location:   []w/heat    []  Ice     []  Heat  []  Ice massage Position:  Location:    []  Laser  []  Other: Position:  Location:      []  Vasopneumatic Device Pressure:       [] lo [] med [] hi   Temperature:      [x] Skin assessment post-treatment:  [x]intact []redness- no adverse reaction    []redness  adverse reaction:     35 min Therapeutic Exercise:  [x] See flow sheet :   Rationale: increase ROM, increase strength and improve coordination to improve the patients ability to ambulate and sit    15 min Manual Therapy: passive hamstrings stretch, hip ER stretch, SKTC, STM to lumbar paraspinals, P/A mobs to lumbar spine grade 2-3    Rationale: decrease pain, increase ROM, increase tissue extensibility and decrease trigger points to improve the patients ability to ambulate and transfer          With   [x] TE   [] TA   [] neuro   [] other: Patient Education: [x] Review HEP    [] Progressed/Changed HEP based on:   [] positioning   [x] body mechanics   [x] transfers   [x] heat/ice application    [] other:      Other Objective/Functional Measures: NT     Pain Level (0-10 scale) post treatment: 2    ASSESSMENT/Changes in Function:   Pt tolerated there-ex well and was fatigued by end of session. Pt needed foam roller on hips to keep them level during bird dogs. Patient will continue to benefit from skilled PT services to modify and progress therapeutic interventions, address functional mobility deficits, address ROM deficits, address strength deficits, analyze and address soft tissue restrictions, analyze and cue movement patterns and analyze and modify body mechanics/ergonomics to attain remaining goals.      []  See Plan of Care  []  See progress note/recertification  []  See Discharge Summary         Progress towards goals / Updated goals:  NT    PLAN  [x]  Upgrade activities as tolerated     [x]  Continue plan of care  []  Update interventions per flow sheet       []  Discharge due to:_  []  Other:_      Mira Schwarz, PT, DPT, OCS 12/12/2019

## 2019-12-18 ENCOUNTER — HOSPITAL ENCOUNTER (OUTPATIENT)
Dept: PHYSICAL THERAPY | Age: 44
Discharge: HOME | End: 2019-12-18
Payer: COMMERCIAL

## 2019-12-18 PROCEDURE — 97110 THERAPEUTIC EXERCISES: CPT | Performed by: PHYSICAL THERAPY ASSISTANT

## 2019-12-18 PROCEDURE — 97140 MANUAL THERAPY 1/> REGIONS: CPT | Performed by: PHYSICAL THERAPY ASSISTANT

## 2019-12-18 PROCEDURE — 97014 ELECTRIC STIMULATION THERAPY: CPT | Performed by: PHYSICAL THERAPY ASSISTANT

## 2019-12-18 NOTE — PROGRESS NOTES
PT DAILY TREATMENT NOTE - [facility] 2-15    Patient Name: Tien Crystal  Date:2019  : 1975  [x]  Patient  Verified  Payor: Hortencia Mcpherson / Plan: [employer] [employer] HMO / Product Type: HMO /    In time: 4:00 pm  Out time:5:05 pm  Total Treatment Time (min): 65  Visit #:  3    Treatment Area: Low back pain [M54.5]    SUBJECTIVE  Pain Level (0-10 scale): 2  Any medication changes, allergies to medications, adverse drug reactions, diagnosis change, or new procedure performed?: [x] No    [] Yes (see summary sheet for update)  Subjective functional status/changes:   [] No changes reported  Pt reports she was not too sore after last session.     OBJECTIVE    Modality rationale: decrease pain, increase tissue extensibility and increase muscle contraction/control to improve the patients ability to ambulate and transfer   Min Type Additional Details      15 [x] Estim: []Att   [x]Unatt    []TENS instruct                  [x]IFC  []Premod   []NMES                     []Other:  []w/US   []w/ice   [x]w/heat  Position: prone  Location: lumbar paraspinals       []  Traction: [] Cervical       []Lumbar                       [] Prone          []Supine                       []Intermittent   []Continuous Lbs:  [] before manual  [] after manual  []w/heat    []  Ultrasound: []Continuous   [] Pulsed                       at: []1MHz   []3MHz Location:  W/cm2:    [] Paraffin         Location:   []w/heat    []  Ice     []  Heat  []  Ice massage Position:  Location:    []  Laser  []  Other: Position:  Location:      []  Vasopneumatic Device Pressure:       [] lo [] med [] hi   Temperature:      [x] Skin assessment post-treatment:  [x]intact []redness- no adverse reaction    []redness  adverse reaction:     35 min Therapeutic Exercise:  [x] See flow sheet :   Rationale: increase ROM, increase strength and improve coordination to improve the patients ability to ambulate and sit    15 min Manual Therapy: passive hamstrings stretch, hip ER stretch, SKTC, STM to lumbar paraspinals, P/A mobs to lumbar spine grade 2-3    Rationale: decrease pain, increase ROM, increase tissue extensibility and decrease trigger points to improve the patients ability to ambulate and transfer          With   [x] TE   [] TA   [] neuro   [] other: Patient Education: [x] Review HEP    [] Progressed/Changed HEP based on:   [] positioning   [x] body mechanics   [x] transfers   [x] heat/ice application    [] other:      Other Objective/Functional Measures: NT     Pain Level (0-10 scale) post treatment: 2    ASSESSMENT/Changes in Function:   Pt tolerated there-ex better today w/ increased core control. Pt demonstrates less sensitivity over lumbar paraspinals. Patient will continue to benefit from skilled PT services to modify and progress therapeutic interventions, address functional mobility deficits, address ROM deficits, address strength deficits, analyze and address soft tissue restrictions, analyze and cue movement patterns and analyze and modify body mechanics/ergonomics to attain remaining goals.      []  See Plan of Care  []  See progress note/recertification  []  See Discharge Summary         Progress towards goals / Updated goals:  NT    PLAN  [x]  Upgrade activities as tolerated     [x]  Continue plan of care  []  Update interventions per flow sheet       []  Discharge due to:_  []  Other:_      Pinky Rodriguez, PT, DPT, OCS 12/18/2019

## 2019-12-26 ENCOUNTER — HOSPITAL ENCOUNTER (OUTPATIENT)
Dept: PHYSICAL THERAPY | Age: 44
Discharge: HOME | End: 2019-12-26
Payer: COMMERCIAL

## 2019-12-26 PROCEDURE — 97140 MANUAL THERAPY 1/> REGIONS: CPT | Performed by: PHYSICAL THERAPY ASSISTANT

## 2019-12-26 PROCEDURE — 97110 THERAPEUTIC EXERCISES: CPT | Performed by: PHYSICAL THERAPY ASSISTANT

## 2019-12-26 PROCEDURE — 97014 ELECTRIC STIMULATION THERAPY: CPT | Performed by: PHYSICAL THERAPY ASSISTANT

## 2019-12-26 NOTE — PROGRESS NOTES
PT DAILY TREATMENT NOTE - [facility] 2-15    Patient Name: Geno Westbrook  Date:2019  : 1975  [x]  Patient  Verified  Payor: Saturnino Lerma / Plan: Mel[employer] [place] Se HMO / Product Type: HMO /    In time: 1:00 pm  Out time: 2:00 pm  Total Treatment Time (min): 60  Visit #:  4    Treatment Area: Low back pain [M54.5]    SUBJECTIVE  Pain Level (0-10 scale): 2  Any medication changes, allergies to medications, adverse drug reactions, diagnosis change, or new procedure performed?: [x] No    [] Yes (see summary sheet for update)  Subjective functional status/changes:   [] No changes reported  Pt reports she has had some good days and bad days.     OBJECTIVE    Modality rationale: decrease pain, increase tissue extensibility and increase muscle contraction/control to improve the patients ability to ambulate and transfer   Min Type Additional Details      10 [x] Estim: []Att   [x]Unatt    []TENS instruct                  [x]IFC  []Premod   []NMES                     []Other:  []w/US   []w/ice   [x]w/heat  Position: prone  Location: lumbar paraspinals       []  Traction: [] Cervical       []Lumbar                       [] Prone          []Supine                       []Intermittent   []Continuous Lbs:  [] before manual  [] after manual  []w/heat    []  Ultrasound: []Continuous   [] Pulsed                       at: []1MHz   []3MHz Location:  W/cm2:    [] Paraffin         Location:   []w/heat    []  Ice     []  Heat  []  Ice massage Position:  Location:    []  Laser  []  Other: Position:  Location:      []  Vasopneumatic Device Pressure:       [] lo [] med [] hi   Temperature:      [x] Skin assessment post-treatment:  [x]intact []redness- no adverse reaction    []redness  adverse reaction:     35 min Therapeutic Exercise:  [x] See flow sheet :   Rationale: increase ROM, increase strength and improve coordination to improve the patients ability to ambulate and sit    15 min Manual Therapy: passive hamstrings stretch, hip ER stretch, SKTC, STM to lumbar paraspinals, P/A mobs to lumbar spine grade 2-3    Rationale: decrease pain, increase ROM, increase tissue extensibility and decrease trigger points to improve the patients ability to ambulate and transfer          With   [x] TE   [] TA   [] neuro   [] other: Patient Education: [x] Review HEP    [] Progressed/Changed HEP based on:   [] positioning   [x] body mechanics   [x] transfers   [x] heat/ice application    [] other:      Other Objective/Functional Measures: NT     Pain Level (0-10 scale) post treatment: 0    ASSESSMENT/Changes in Function:   Pt tolerated there-ex well w/ no pain. Pt remains very TTP over lumbar paraspinals bilaterally. Patient will continue to benefit from skilled PT services to modify and progress therapeutic interventions, address functional mobility deficits, address ROM deficits, address strength deficits, analyze and address soft tissue restrictions, analyze and cue movement patterns and analyze and modify body mechanics/ergonomics to attain remaining goals.      []  See Plan of Care  []  See progress note/recertification  []  See Discharge Summary         Progress towards goals / Updated goals:  NT    PLAN  [x]  Upgrade activities as tolerated     [x]  Continue plan of care  []  Update interventions per flow sheet       []  Discharge due to:_  []  Other:_      Rui Stiles, PT, DPT, OCS 12/26/2019

## 2019-12-30 ENCOUNTER — APPOINTMENT (OUTPATIENT)
Dept: PHYSICAL THERAPY | Age: 44
End: 2019-12-30
Payer: COMMERCIAL

## 2020-01-06 ENCOUNTER — OFFICE VISIT (OUTPATIENT)
Dept: FAMILY MEDICINE CLINIC | Age: 45
End: 2020-01-06

## 2020-01-06 VITALS
DIASTOLIC BLOOD PRESSURE: 76 MMHG | HEART RATE: 98 BPM | BODY MASS INDEX: 38.09 KG/M2 | OXYGEN SATURATION: 99 % | TEMPERATURE: 97.6 F | WEIGHT: 207 LBS | RESPIRATION RATE: 18 BRPM | SYSTOLIC BLOOD PRESSURE: 120 MMHG | HEIGHT: 62 IN

## 2020-01-06 DIAGNOSIS — F41.9 ANXIETY AND DEPRESSION: Primary | ICD-10-CM

## 2020-01-06 DIAGNOSIS — F32.A ANXIETY AND DEPRESSION: Primary | ICD-10-CM

## 2020-01-06 DIAGNOSIS — G89.29 CHRONIC MIDLINE LOW BACK PAIN WITHOUT SCIATICA: ICD-10-CM

## 2020-01-06 DIAGNOSIS — M19.141 POST-TRAUMATIC OSTEOARTHRITIS OF RIGHT HAND: ICD-10-CM

## 2020-01-06 DIAGNOSIS — E66.9 OBESITY (BMI 35.0-39.9 WITHOUT COMORBIDITY): ICD-10-CM

## 2020-01-06 DIAGNOSIS — M54.50 CHRONIC MIDLINE LOW BACK PAIN WITHOUT SCIATICA: ICD-10-CM

## 2020-01-06 RX ORDER — MELOXICAM 15 MG/1
TABLET ORAL
COMMUNITY
Start: 2020-01-06 | End: 2021-07-20

## 2020-01-06 RX ORDER — DICLOFENAC SODIUM 10 MG/G
2 GEL TOPICAL
Qty: 100 G | Refills: 2 | Status: SHIPPED | OUTPATIENT
Start: 2020-01-06

## 2020-01-06 NOTE — PATIENT INSTRUCTIONS
Learning About Anxiety Disorders  What are anxiety disorders? Anxiety disorders are a type of medical problem. They cause severe anxiety. When you feel anxious, you feel that something bad is about to happen. This feeling interferes with your life. These disorders include:  · Generalized anxiety disorder. You feel worried and stressed about many everyday events and activities. This goes on for several months and disrupts your life on most days. · Panic disorder. You have repeated panic attacks. A panic attack is a sudden, intense fear or anxiety. It may make you feel short of breath. Your heart may pound. · Social anxiety disorder. You feel very anxious about what you will say or do in front of people. For example, you may be scared to talk or eat in public. This problem affects your daily life. · Phobias. You are very scared of a specific object, situation, or activity. For example, you may fear spiders, high places, or small spaces. What are the symptoms? Generalized anxiety disorder  Symptoms may include:  · Feeling worried and stressed about many things almost every day. · Feeling tired or irritable. You may have a hard time concentrating. · Having headaches or muscle aches. · Having a hard time getting to sleep or staying asleep. Panic disorder  You may have repeated panic attacks when there is no reason for feeling afraid. You may change your daily activities because you worry that you will have another attack. Symptoms may include:  · Intense fear, terror, or anxiety. · Trouble breathing or very fast breathing. · Chest pain or tightness. · A heartbeat that races or is not regular. Social anxiety disorder  Symptoms may include:  · Fear about a social situation, such as eating in front of others or speaking in public. You may worry a lot. Or you may be afraid that something bad will happen. · Anxiety that can cause you to blush, sweat, and feel shaky.   · A heartbeat that is faster than normal.  · A hard time focusing. Phobias  Symptoms may include:  · More fear than most people of being around an object, being in a situation, or doing an activity. You might also be stressed about the chance of being around the thing you fear. · Worry about losing control, panicking, fainting, or having physical symptoms like a faster heartbeat when you are around the situation or object. How are these disorders treated? Anxiety disorders can be treated with medicines or counseling. A combination of both may be used. Medicines may include:  · Antidepressants. These may help your symptoms by keeping chemicals in your brain in balance. · Benzodiazepines. These may give you short-term relief of your symptoms. Some people use cognitive-behavioral therapy. A therapist helps you learn to change stressful or bad thoughts into helpful thoughts. Lead a healthy lifestyle  A healthy lifestyle may help you feel better. · Get at least 30 minutes of exercise on most days of the week. Walking is a good choice. · Eat a healthy diet. Include fruits, vegetables, lean proteins, and whole grains in your diet each day. · Try to go to bed at the same time every night. Try for 8 hours of sleep a night. · Find ways to manage stress. Try relaxation exercises. · Avoid alcohol and illegal drugs. Follow-up care is a key part of your treatment and safety. Be sure to make and go to all appointments, and call your doctor if you are having problems. It's also a good idea to know your test results and keep a list of the medicines you take. Where can you learn more? Go to http://go-juani.info/. Enter F959 in the search box to learn more about \"Learning About Anxiety Disorders. \"  Current as of: May 28, 2019  Content Version: 12.2  © 9115-3366 Healthwise, [employer]orporated. Care instructions adapted under license by [employer] (which disclaims liability or warranty for this information).  If you have questions about a medical condition or this instruction, always ask your healthcare professional. [address] any warranty or liability for your use of this information. Liraglutide (By injection)   Liraglutide (eco-h-PIQJ-tide)  Treats type 2 diabetes and helps with weight loss in certain patients. Also reduces the risk of heart attacks and strokes in patients with type 2 diabetes and heart or blood vessel disease. Brand Name(s): Saxenda, Victoza   There may be other brand names for this medicine. When This Medicine Should Not Be Used: This medicine is not right for everyone. Do not use it if you had an allergic reaction to liraglutide, or if you have multiple endocrine neoplasia syndrome type 2 (MEN 2) or if you or anyone in your family had medullary thyroid cancer. Tell your doctor if you are pregnant or have become pregnant while you are using this medicine. How to Use This Medicine:   Injectable  · Your doctor will prescribe your exact dose and tell you how often it should be given. This medicine is given as a shot under the skin of your stomach, thighs, or upper arms. · If you use insulin in addition to this medicine, do not mix them into the same syringe. You may give the shots in the same area (including your stomach), but do not give the shots right next to each other. · You may be taught how to give your medicine at home. Make sure you understand all instructions before giving yourself an injection. Do not use more medicine or use it more often than your doctor tells you to. · Check the liquid in the pen. It should be clear and colorless. Do not use it if it is cloudy, discolored, or has particles in it. · You will be shown the body areas where this shot can be given. Use a different body area each time you give yourself a shot. Keep track of where you give each shot to make sure you rotate body areas.   · Use a new needle and syringe each time you inject your medicine. · Never share medicine pens with others under any circumstances. Sharing needles or pens can result in transmission of infection. · Drink extra fluids so you will urinate more often and help prevent kidney problems. · This medicine should come with a Medication Guide. Ask your pharmacist for a copy if you do not have one. · Missed dose: If you miss a dose of this medicine, use it as soon as you remember. Then take your next dose at your usual time. Never take extra medicine to make up for a missed dose. If you miss a dose for 3 days or more, call your doctor to talk about how to restart your treatment. · Store your new, unused medicine pen in its original carton in the refrigerator. Protect it from light. Do not freeze this medicine or use it if it has been frozen. You may store the opened medicine pen in the refrigerator or at room temperature for 30 days. Throw away your used pen after 30 days, even if it still has medicine in it. Always remove the needle from the pen before you store it. · Throw away used needles in a hard, closed container that the needles cannot poke through. Keep this container away from children and pets. Drugs and Foods to Avoid:      Ask your doctor or pharmacist before using any other medicine, including over-the-counter medicines, vitamins, and herbal products. Warnings While Using This Medicine:   · Tell your doctor if you are breastfeeding, or if you have kidney disease, liver disease, digestion problems (including gastroparesis), gallbladder disease, or a history of pancreas problems, depression, or angioedema (swelling of the arms, face, hands, mouth, or throat). · Do not use Saxenda® if you are also using Victoza®. They contain the same medicine.   · This medicine may cause the following problems:   ¨ Increased risk for thyroid tumors  ¨ Pancreatitis  ¨ Low blood sugar  ¨ Kidney problems  ¨ Gallbladder problems, including gallstones  ¨ Thoughts of hurting yourself Navi Sutton)  · Your doctor will do lab tests at regular visits to check on the effects of this medicine. Keep all appointments. · Keep all medicine out of the reach of children. Never share your medicine with anyone. Possible Side Effects While Using This Medicine:   Call your doctor right away if you notice any of these side effects:  · Allergic reaction: Itching or hives, swelling in your face or hands, swelling or tingling in your mouth or throat, chest tightness, trouble breathing  · Change in how much or how often you urinate, painful or burning urination  · Feeling sad or depressed, thoughts of suicide, unusual changes in mood or behavior  · Shaking, trembling, sweating, fast or pounding heartbeat, fainting, hunger, confusion  · Sudden and severe stomach pain, nausea, vomiting, fever, lightheadedness  · Trouble breathing or swallowing, a lump in your neck, hoarseness when speaking  · Yellow skin or eyes  If you notice these less serious side effects, talk with your doctor:   · Decreased appetite  · Diarrhea, constipation, stomach upset  · Dizziness  · Headache  · Redness, itching, swelling, or any changes in your skin where the shot was given  If you notice other side effects that you think are caused by this medicine, tell your doctor. Call your doctor for medical advice about side effects. You may report side effects to FDA at 6-292-FDA-3493  © 2017 [address] Information is for End User's use only and may not be sold, redistributed or otherwise used for commercial purposes. The above information is an  only. It is not intended as medical advice for individual conditions or treatments. Talk to your doctor, nurse or pharmacist before following any medical regimen to see if it is safe and effective for you.

## 2020-01-06 NOTE — PROGRESS NOTES
Tiera Hernandez  38 y.o. female  1975  [address] [facility] [address]  [id number]     [address] [facility]       Encounter Date: 1/6/2020           Established Patient Visit Note: Fadi Lemus MD    Reason for Appointment:  Chief Complaint   Patient presents with    Follow-up     [facility]       History of Present Illness:  History provided by patient    Tiera Hernandez is a 38 y.o. female who presents to clinic today for:    Acute  Patient recently treated for URI at urgent care that she now reports as improving. She denies any dyspnea or CP. Chronic     Obesity (BMI 35.0-39.9 without comorbidity)     Duration: several years  Diet: has been trying to eat healthier recently; saw nutritionist in the past (declines today)      Anxiety and depression     Duration: diagnosed around 2015  Inciting Factors: during original diagnosis, patient was dealing with a difficult supervisor  Medicine: Zoloft 100mg daily  Symptoms: poor mood, poor sleep, feeling sad and angry  Suicide: denies any present or past attempts of suicide  Counseling: Edu Barrios 1.5 years ago  Interval History: denies any recent changes, denies any SI, she reports that she is not sure if Zoloft is still working      Low back pain     Duration: symptoms started 2017  Inciting Event: no particular event  Location:/Radiation: low back, midline  Presence of Sciatica: denies  Severity: mild-moderate  Character: dull and achy  Timing: comes and goes lasting 2hours to 2 days  Frequency: 2-3/month  Setting: worse during MP, worse with standing for long amounts of time  MF: worse with standing, better with sitting,   Work: work at [employer] as [employer]   AS: denies any pain with urination, saddle anesthesia, LE weakness, or loss of bowel or bladder control.   Interval History: has improved with PT; doing exercises at home              Review of Systems  Const: denies fatigue, denies fever, denies chills  Cardiac: denies palpitations or chest pain  Resp: denies dyspnea, denies wheezing    Allergies: Patient has no known allergies. Medications: (Updated to reflect final medication list after visit)    Current Outpatient Medications:     meloxicam (MOBIC) 15 mg tablet, TAKE 1 TABLET BY MOUTH EVERY DAY, Disp: , Rfl:     diclofenac (VOLTAREN) 1 % gel, Apply 2 g to affected area two (2) times daily as needed for Pain., Disp: 100 g, Rfl: 2    LO LOESTRIN FE 1 mg-10 mcg (24)/10 mcg (2) tab, TAKE 1 TABLET BY ORAL ROUTE ONCE DAILY, Disp: , Rfl: 4    sertraline (ZOLOFT) 100 mg tablet, TAKE 1 TABLET BY MOUTH EVERY DAY, Disp: 90 Tab, Rfl: 1    cholecalciferol (VITAMIN D3) 1,000 unit tablet, Take  by mouth daily. , Disp: , Rfl:     cpap machine kit, by Does Not Apply route., Disp: , Rfl:     SUMAtriptan (IMITREX) 50 mg tablet, Take 1 Tab by mouth once as needed for Migraine (may repeat in 2 hours after initial dose) for up to 1 dose., Disp: 10 Tab, Rfl: 0    fluticasone (FLONASE) 50 mcg/actuation nasal spray, nightly., Disp: , Rfl:     multivitamin (ONE A DAY) tablet, Take 1 Tab by mouth daily. , Disp: , Rfl:     fexofenadine (ALLEGRA) 180 mg tablet, Take 180 mg by mouth daily. , Disp: , Rfl:     History  Patient Care Team:  Karina Olivas MD as PCP - General (Family Practice)  Karina Olivas MD as PCP - [facility]  Jose Parsons MD (Endocrinology)  Lolita Turner MD as Physician (Obstetrics & Gynecology)    Past Medical History: she has a past medical history of Allergic rhinitis, Infertility, LEONIE on CPAP (4/4/2018), Reactive depression (situational), Situational stress, and Vitamin D deficiency (2/2015). LEONIE    Past Surgical History: she has a past surgical history that includes hx orthopaedic (2010).     Family Medical History: family history includes Asthma in her brother and father; Heart Disease (age of onset: 76) in her father; Dorothea Cortez (age of onset: 40) in her brother; Obesity in her mother. Social History: she reports that she has never smoked. She has never used smokeless tobacco. She reports current alcohol use. She reports that she does not use drugs. There are no preventive care reminders to display for this patient. Objective:   Visit Vitals  /76 (BP 1 Location: Left arm, BP Patient Position: Sitting)   Pulse 98   Temp 97.6 °F (36.4 °C) (Oral)   Resp 18   Ht 5' 2\" (1.575 m)   Wt 207 lb (93.9 kg)   LMP 12/23/2019 (Exact Date)   SpO2 99%   BMI 37.86 kg/m²     Wt Readings from Last 3 Encounters:   01/06/20 207 lb (93.9 kg)   11/25/19 205 lb 9.6 oz (93.3 kg)   11/18/19 205 lb (93 kg)       Physical Exam  HENT:      Head: Normocephalic and atraumatic. Right Ear: External ear normal.      Left Ear: External ear normal.      Nose: Nose normal.      Mouth/Throat:      Pharynx: No oropharyngeal exudate. Eyes:      General: No scleral icterus. Right eye: No discharge. Left eye: No discharge. Conjunctiva/sclera: Conjunctivae normal.      Pupils: Pupils are equal, round, and reactive to light. Neck:      Musculoskeletal: Normal range of motion and neck supple. Thyroid: No thyromegaly. Vascular: No JVD. Trachea: No tracheal deviation. Cardiovascular:      Rate and Rhythm: Normal rate and regular rhythm. Heart sounds: Normal heart sounds. No murmur. No friction rub. No gallop. Pulmonary:      Effort: Pulmonary effort is normal. No respiratory distress. Breath sounds: Normal breath sounds. No stridor. No wheezing. Abdominal:      General: Bowel sounds are normal. There is no distension. Palpations: Abdomen is soft. There is no mass. Tenderness: There is no tenderness. There is no guarding or rebound. Musculoskeletal: Normal range of motion. General: No tenderness or deformity. Lymphadenopathy:      Cervical: No cervical adenopathy.    Skin:     General: Skin is warm and dry. Neurological:      Mental Status: She is alert. Cranial Nerves: No cranial nerve deficit. Coordination: Coordination normal.      Gait: Gait is intact. Deep Tendon Reflexes: Reflexes normal.         Assessment & Plan:    Diagnoses and all orders for this visit:    1. Anxiety and depression  Assessment & Plan:  Chronic, not controlled on Zoloft; patient would like to look into other options for mood that cause less weight gain  - provided information regarding other antidepressants; will review further at follow up visit  - patient agreeable to counseling; will provide referral    Orders:  -     REFERRAL TO PSYCHOLOGY    2. Post-traumatic osteoarthritis of right hand  Assessment & Plan:  Chronic, managed by orthopedics  - continue current regime per orthopedics  - f/u with orthopedics as scheduled    Orders:  -     diclofenac (VOLTAREN) 1 % gel; Apply 2 g to affected area two (2) times daily as needed for Pain. 3. Chronic midline low back pain without sciatica  Assessment & Plan:  Improved with PT  - continue home exercise  - discussed red flag symptoms and reasons to call or go to ED        4. Obesity (BMI 35.0-39.9 without comorbidity)  Assessment & Plan:  Chronic, patient is working to eat healthier  - discussed saxenda; patient will look it over and discuss further at next time  - continue diet and exercise          I have discussed the diagnosis with the patient and the intended plan as seen in the above orders. The patient has received an after-visit summary along with patient information handout. I have discussed medication side effects and warnings with the patient as well. Dispostion  Follow-up and Dispositions    · Return in about 4 weeks (around 2/3/2020) for anxiety and depression.            Fadi Lemus MD

## 2020-01-06 NOTE — PROGRESS NOTES
Chief Complaint   Patient presents with    Follow-up     [facility]     1. Have you been to the ER, urgent care clinic since your last visit? Hospitalized since your last visit? Yes When: [facility] [place] [facility]    2. Have you seen or consulted any other health care providers outside of the [address] [place] [address] since your last visit? Include any pap smears or colon screening.  No

## 2020-01-07 PROBLEM — E66.9 OBESITY (BMI 35.0-39.9 WITHOUT COMORBIDITY): Status: ACTIVE | Noted: 2020-01-07

## 2020-01-07 PROBLEM — M19.141 POST-TRAUMATIC OSTEOARTHRITIS OF RIGHT HAND: Status: ACTIVE | Noted: 2020-01-07

## 2020-01-07 NOTE — ASSESSMENT & PLAN NOTE
Chronic, patient is working to eat healthier  - discussed saxenda; patient will look it over and discuss further at next time  - continue diet and exercise

## 2020-01-07 NOTE — ASSESSMENT & PLAN NOTE
Chronic, not controlled on Zoloft; patient would like to look into other options for mood that cause less weight gain  - provided information regarding other antidepressants; will review further at follow up visit  - patient agreeable to counseling; will provide referral

## 2020-01-07 NOTE — ASSESSMENT & PLAN NOTE
Improved with PT  - continue home exercise  - discussed red flag symptoms and reasons to call or go to ED

## 2020-01-08 ENCOUNTER — HOSPITAL ENCOUNTER (OUTPATIENT)
Dept: PHYSICAL THERAPY | Age: 45
Discharge: HOME | End: 2020-01-08
Payer: COMMERCIAL

## 2020-01-08 PROCEDURE — 97014 ELECTRIC STIMULATION THERAPY: CPT | Performed by: PHYSICAL THERAPY ASSISTANT

## 2020-01-08 PROCEDURE — 97140 MANUAL THERAPY 1/> REGIONS: CPT | Performed by: PHYSICAL THERAPY ASSISTANT

## 2020-01-08 PROCEDURE — 97110 THERAPEUTIC EXERCISES: CPT | Performed by: PHYSICAL THERAPY ASSISTANT

## 2020-01-08 NOTE — PROGRESS NOTES
PT DAILY TREATMENT NOTE - [facility] 2-15    Patient Name: Michaela Cline  Date:2020  : 1975  [x]  Patient  Verified  Payor: Romerochucho JeffriesAmin / Plan: [employer] HMO / Product Type: HMO /    In time: 3:00 pm  Out time: 4:00 pm  Total Treatment Time (min): 60  Visit #:  5    Treatment Area: Low back pain [M54.5]    SUBJECTIVE  Pain Level (0-10 scale): 1  Any medication changes, allergies to medications, adverse drug reactions, diagnosis change, or new procedure performed?: [x] No    [] Yes (see summary sheet for update)  Subjective functional status/changes:   [] No changes reported  Pt reports she is doing a little better but has been sick the past week and that is why she was unable to attend her appointment last week.     OBJECTIVE    Modality rationale: decrease pain, increase tissue extensibility and increase muscle contraction/control to improve the patients ability to ambulate and transfer   Min Type Additional Details      10 [x] Estim: []Att   [x]Unatt    []TENS instruct                  [x]IFC  []Premod   []NMES                     []Other:  []w/US   []w/ice   [x]w/heat  Position: prone  Location: lumbar paraspinals       []  Traction: [] Cervical       []Lumbar                       [] Prone          []Supine                       []Intermittent   []Continuous Lbs:  [] before manual  [] after manual  []w/heat    []  Ultrasound: []Continuous   [] Pulsed                       at: []1MHz   []3MHz Location:  W/cm2:    [] Paraffin         Location:   []w/heat    []  Ice     []  Heat  []  Ice massage Position:  Location:    []  Laser  []  Other: Position:  Location:      []  Vasopneumatic Device Pressure:       [] lo [] med [] hi   Temperature:      [x] Skin assessment post-treatment:  [x]intact []redness- no adverse reaction    []redness  adverse reaction:     35 min Therapeutic Exercise:  [x] See flow sheet :   Rationale: increase ROM, increase strength and improve coordination to improve the patients ability to ambulate and sit    15 min Manual Therapy: passive hamstrings stretch, hip ER stretch, SKTC, STM to lumbar paraspinals, P/A mobs to lumbar spine grade 2-3    Rationale: decrease pain, increase ROM, increase tissue extensibility and decrease trigger points to improve the patients ability to ambulate and transfer          With   [x] TE   [] TA   [] neuro   [] other: Patient Education: [x] Review HEP    [] Progressed/Changed HEP based on:   [] positioning   [x] body mechanics   [x] transfers   [x] heat/ice application    [] other:      Other Objective/Functional Measures: NT     Pain Level (0-10 scale) post treatment: 0    ASSESSMENT/Changes in Function:   Pt tolerated there-ex well today and is much less TTP over lumbar paraspinals. Pt had no pain w/ additional exercises. Patient will continue to benefit from skilled PT services to modify and progress therapeutic interventions, address functional mobility deficits, address ROM deficits, address strength deficits, analyze and address soft tissue restrictions, analyze and cue movement patterns and analyze and modify body mechanics/ergonomics to attain remaining goals.      []  See Plan of Care  []  See progress note/recertification  []  See Discharge Summary         Progress towards goals / Updated goals:  NT    PLAN  [x]  Upgrade activities as tolerated     [x]  Continue plan of care  []  Update interventions per flow sheet       []  Discharge due to:_  []  Other:_      Leslie Aguilar, PT, DPT, OCS 1/8/2020

## 2020-01-15 ENCOUNTER — HOSPITAL ENCOUNTER (OUTPATIENT)
Dept: PHYSICAL THERAPY | Age: 45
Discharge: HOME | End: 2020-01-15
Payer: COMMERCIAL

## 2020-01-15 PROCEDURE — 97140 MANUAL THERAPY 1/> REGIONS: CPT | Performed by: PHYSICAL THERAPY ASSISTANT

## 2020-01-15 PROCEDURE — 97110 THERAPEUTIC EXERCISES: CPT | Performed by: PHYSICAL THERAPY ASSISTANT

## 2020-01-15 PROCEDURE — 97014 ELECTRIC STIMULATION THERAPY: CPT | Performed by: PHYSICAL THERAPY ASSISTANT

## 2020-01-15 NOTE — PROGRESS NOTES
PT DAILY TREATMENT NOTE - [facility] 2-15    Patient Name: Rosario Morales  Date:1/15/2020  : 1975  [x]  Patient  Verified  Payor: Shasha Mar / Plan: [employer] HMO / Product Type: HMO /    In time: 5:00 pm  Out time: 6:00 pm  Total Treatment Time (min): 60  Visit #:  6    Treatment Area: Low back pain [M54.5]    SUBJECTIVE  Pain Level (0-10 scale):  2  Any medication changes, allergies to medications, adverse drug reactions, diagnosis change, or new procedure performed?: [x] No    [] Yes (see summary sheet for update)  Subjective functional status/changes:   [] No changes reported  Pt reports she is a little sore from standing more than usual.    OBJECTIVE    Modality rationale: decrease pain, increase tissue extensibility and increase muscle contraction/control to improve the patients ability to ambulate and transfer   Min Type Additional Details      10 [x] Estim: []Att   [x]Unatt    []TENS instruct                  [x]IFC  []Premod   []NMES                     []Other:  []w/US   []w/ice   [x]w/heat  Position: prone  Location: lumbar paraspinals       []  Traction: [] Cervical       []Lumbar                       [] Prone          []Supine                       []Intermittent   []Continuous Lbs:  [] before manual  [] after manual  []w/heat    []  Ultrasound: []Continuous   [] Pulsed                       at: []1MHz   []3MHz Location:  W/cm2:    [] Paraffin         Location:   []w/heat    []  Ice     []  Heat  []  Ice massage Position:  Location:    []  Laser  []  Other: Position:  Location:      []  Vasopneumatic Device Pressure:       [] lo [] med [] hi   Temperature:      [x] Skin assessment post-treatment:  [x]intact []redness- no adverse reaction    []redness  adverse reaction:     35 min Therapeutic Exercise:  [x] See flow sheet :   Rationale: increase ROM, increase strength and improve coordination to improve the patients ability to ambulate and sit    15 min Manual Therapy: passive hamstrings stretch, hip ER stretch, SKTC, STM to lumbar paraspinals, P/A mobs to lumbar spine grade 2-3    Rationale: decrease pain, increase ROM, increase tissue extensibility and decrease trigger points to improve the patients ability to ambulate and transfer          With   [x] TE   [] TA   [] neuro   [] other: Patient Education: [x] Review HEP    [] Progressed/Changed HEP based on:   [] positioning   [x] body mechanics   [x] transfers   [x] heat/ice application    [] other:      Other Objective/Functional Measures: NT     Pain Level (0-10 scale) post treatment: 0    ASSESSMENT/Changes in Function:   Pt educated on work modifications. Pt tolerated there-ex well w/ no increase in pain. Pt demonstrates good hip and lumbar ROM. Patient will continue to benefit from skilled PT services to modify and progress therapeutic interventions, address functional mobility deficits, address ROM deficits, address strength deficits, analyze and address soft tissue restrictions, analyze and cue movement patterns and analyze and modify body mechanics/ergonomics to attain remaining goals.      []  See Plan of Care  []  See progress note/recertification  []  See Discharge Summary         Progress towards goals / Updated goals:  NT    PLAN  [x]  Upgrade activities as tolerated     [x]  Continue plan of care  []  Update interventions per flow sheet       []  Discharge due to:_  []  Other:_      Dorothy Wilson, PT, DPT, OCS 1/15/2020

## 2020-01-22 ENCOUNTER — HOSPITAL ENCOUNTER (OUTPATIENT)
Dept: PHYSICAL THERAPY | Age: 45
Discharge: HOME | End: 2020-01-22
Payer: COMMERCIAL

## 2020-01-22 PROCEDURE — 97140 MANUAL THERAPY 1/> REGIONS: CPT | Performed by: PHYSICAL THERAPY ASSISTANT

## 2020-01-22 PROCEDURE — 97014 ELECTRIC STIMULATION THERAPY: CPT | Performed by: PHYSICAL THERAPY ASSISTANT

## 2020-01-22 PROCEDURE — 97110 THERAPEUTIC EXERCISES: CPT | Performed by: PHYSICAL THERAPY ASSISTANT

## 2020-01-22 NOTE — PROGRESS NOTES
PT DAILY TREATMENT NOTE - [facility] 2-15    Patient Name: Ranulfo Chauhan  Date:2020  : 1975  [x]  Patient  Verified  Payor: Hugo Watt / Plan: [employer] HMO / Product Type: HMO /    In time: 5:00 pm  Out time: 6:05 pm  Total Treatment Time (min): 65  Visit #:  7    Treatment Area: Low back pain [M54.5]    SUBJECTIVE  Pain Level (0-10 scale): 2  Any medication changes, allergies to medications, adverse drug reactions, diagnosis change, or new procedure performed?: [x] No    [] Yes (see summary sheet for update)  Subjective functional status/changes:   [] No changes reported  Pt reports she is doing pretty well overall w/ no soreness after last session.     OBJECTIVE    Modality rationale: decrease pain, increase tissue extensibility and increase muscle contraction/control to improve the patients ability to ambulate and transfer   Min Type Additional Details      10 [x] Estim: []Att   [x]Unatt    []TENS instruct                  [x]IFC  []Premod   []NMES                     []Other:  []w/US   []w/ice   [x]w/heat  Position: prone  Location: lumbar paraspinals       []  Traction: [] Cervical       []Lumbar                       [] Prone          []Supine                       []Intermittent   []Continuous Lbs:  [] before manual  [] after manual  []w/heat    []  Ultrasound: []Continuous   [] Pulsed                       at: []1MHz   []3MHz Location:  W/cm2:    [] Paraffin         Location:   []w/heat    []  Ice     []  Heat  []  Ice massage Position:  Location:    []  Laser  []  Other: Position:  Location:      []  Vasopneumatic Device Pressure:       [] lo [] med [] hi   Temperature:      [x] Skin assessment post-treatment:  [x]intact []redness- no adverse reaction    []redness  adverse reaction:     35 min Therapeutic Exercise:  [x] See flow sheet :   Rationale: increase ROM, increase strength and improve coordination to improve the patients ability to ambulate and sit    15 min Manual Therapy: passive hamstrings stretch, hip ER stretch, SKTC, STM to lumbar paraspinals, P/A mobs to lumbar spine grade 2-3    Rationale: decrease pain, increase ROM, increase tissue extensibility and decrease trigger points to improve the patients ability to ambulate and transfer          With   [x] TE   [] TA   [] neuro   [] other: Patient Education: [x] Review HEP    [] Progressed/Changed HEP based on:   [] positioning   [x] body mechanics   [x] transfers   [x] heat/ice application    [] other:      Other Objective/Functional Measures: NT     Pain Level (0-10 scale) post treatment: 0    ASSESSMENT/Changes in Function:   Pt making good improvements each week. Patient will continue to benefit from skilled PT services to modify and progress therapeutic interventions, address functional mobility deficits, address ROM deficits, address strength deficits, analyze and address soft tissue restrictions, analyze and cue movement patterns and analyze and modify body mechanics/ergonomics to attain remaining goals.      []  See Plan of Care  []  See progress note/recertification  []  See Discharge Summary         Progress towards goals / Updated goals:  NT    PLAN  [x]  Upgrade activities as tolerated     [x]  Continue plan of care  []  Update interventions per flow sheet       []  Discharge due to:_  []  Other:_      Brigitte Flavin, PT, DPT, OCS 1/22/2020

## 2020-01-29 ENCOUNTER — APPOINTMENT (OUTPATIENT)
Dept: PHYSICAL THERAPY | Age: 45
End: 2020-01-29
Payer: COMMERCIAL

## 2020-02-03 ENCOUNTER — OFFICE VISIT (OUTPATIENT)
Dept: FAMILY MEDICINE CLINIC | Age: 45
End: 2020-02-03

## 2020-02-03 VITALS
RESPIRATION RATE: 18 BRPM | DIASTOLIC BLOOD PRESSURE: 81 MMHG | BODY MASS INDEX: 38.64 KG/M2 | HEIGHT: 62 IN | WEIGHT: 210 LBS | TEMPERATURE: 97.3 F | OXYGEN SATURATION: 95 % | HEART RATE: 85 BPM | SYSTOLIC BLOOD PRESSURE: 130 MMHG

## 2020-02-03 DIAGNOSIS — E66.01 SEVERE OBESITY (BMI 35.0-35.9 WITH COMORBIDITY) (HCC): ICD-10-CM

## 2020-02-03 DIAGNOSIS — G47.33 OSA ON CPAP: ICD-10-CM

## 2020-02-03 DIAGNOSIS — Z99.89 OSA ON CPAP: ICD-10-CM

## 2020-02-03 DIAGNOSIS — F32.A ANXIETY AND DEPRESSION: Primary | ICD-10-CM

## 2020-02-03 DIAGNOSIS — F41.9 ANXIETY AND DEPRESSION: Primary | ICD-10-CM

## 2020-02-03 RX ORDER — SERTRALINE HYDROCHLORIDE 25 MG/1
TABLET, FILM COATED ORAL
Qty: 30 TAB | Refills: 0 | Status: SHIPPED | OUTPATIENT
Start: 2020-02-03 | End: 2020-04-22 | Stop reason: SDUPTHER

## 2020-02-03 NOTE — PROGRESS NOTES
Karlie Obando  38 y.o. female  1975  [address]   [id number]     [facility]       Encounter Date: 2/3/2020           Established Patient Visit Note: Diana Leon MD    Reason for Appointment:  Chief Complaint   Patient presents with    Follow-up       History of Present Illness:  History provided by patient    Karlie Obando is a 38 y.o. female who presents to clinic today for:       Severe Obesity     Medications: none  Duration: several years  Diet: has been trying to eat healthier recently;  Nutrition Evaluation: saw nutritionist in the past (declines today)      Anxiety and depression     Duration: diagnosed around 2015  Inciting Factors: during original diagnosis, patient was dealing with a difficult supervisor  Medicine: Zoloft 100mg  Symptoms: poor mood, poor sleep, feeling sad and angry  Suicide: denies any present or past attempts of suicide  Counseling: Liane Lambert 1.5 years ago  Interval History (2/3/2020): she reports that her mood is improving and she feels that she can taper down from the soloft       LEONIE on CPAP     Duration: diagnosed 03/2018  Provider: Nan Holm M.D  Medicine: taking multivitamoin with sleep aid  Interval History (02/03/20)improved with CPAP and ultivitamin         Review of Systems  Const:  denies fever, denies chills  Cardiac: denies palpitations or chest pain  Resp: denies dyspnea, denies wheezing      Allergies: Patient has no known allergies.     Medications: (Updated to reflect final medication list after visit)    Current Outpatient Medications:     sertraline (ZOLOFT) 100 mg tablet, Take 1/2 tab of 100mg in combination with 25mg tab to taper down, Disp: 90 Tab, Rfl: 1    sertraline (ZOLOFT) 25 mg tablet, Use in combination with 50mg Zoloft for total daily dose of 75mg, Disp: 30 Tab, Rfl: 0    naltrexone-buPROPion (CONTRAVE) 8-90 mg TbER ER tablet, Week 1 1 tab PO QAM, Week 2 1QAM 1QHS, Week 3 2QAM 1 QHS, Week 4 & beyond 2QAM 2QHS, Disp: 90 Tab, Rfl: 0    meloxicam (MOBIC) 15 mg tablet, TAKE 1 TABLET BY MOUTH EVERY DAY, Disp: , Rfl:     diclofenac (VOLTAREN) 1 % gel, Apply 2 g to affected area two (2) times daily as needed for Pain., Disp: 100 g, Rfl: 2    LO LOESTRIN FE 1 mg-10 mcg (24)/10 mcg (2) tab, TAKE 1 TABLET BY ORAL ROUTE ONCE DAILY, Disp: , Rfl: 4    cholecalciferol (VITAMIN D3) 1,000 unit tablet, Take  by mouth daily. , Disp: , Rfl:     cpap machine kit, by Does Not Apply route., Disp: , Rfl:     fluticasone (FLONASE) 50 mcg/actuation nasal spray, nightly., Disp: , Rfl:     multivitamin (ONE A DAY) tablet, Take 1 Tab by mouth daily. , Disp: , Rfl:     fexofenadine (ALLEGRA) 180 mg tablet, Take 180 mg by mouth daily. , Disp: , Rfl:     SUMAtriptan (IMITREX) 50 mg tablet, Take 1 Tab by mouth once as needed for Migraine (may repeat in 2 hours after initial dose) for up to 1 dose., Disp: 10 Tab, Rfl: 0    History  Patient Care Team:  Haider Lagos MD as PCP - General (Family Practice)  Haider Lagos MD as PCP - [facility] Provider  Gab Jack MD (Endocrinology)  Augusta Moon MD as Physician (Obstetrics & Gynecology)    Past Medical History: she has a past medical history of Allergic rhinitis, Infertility, LEONIE on CPAP (4/4/2018), Reactive depression (situational), Situational stress, and Vitamin D deficiency (2/2015). Past Surgical History: she has a past surgical history that includes hx orthopaedic (2010). Family Medical History: family history includes Asthma in her brother and father; Heart Disease (age of onset: 76) in her father; Shaunna Boy (age of onset: 40) in her brother; Obesity in her mother. Social History: she reports that she has never smoked. She has never used smokeless tobacco. She reports current alcohol use. She reports that she does not use drugs.       Objective:   Visit Vitals  /81 (BP 1 Location: Right arm, BP Patient Position: Sitting)   Pulse 85   Temp 97.3 °F (36.3 °C) (Oral)   Resp 18   Ht 5' 2\" (1.575 m)   Wt 210 lb (95.3 kg)   LMP 01/07/2020 (Approximate)   SpO2 95%   BMI 38.41 kg/m²     Wt Readings from Last 3 Encounters:   02/03/20 210 lb (95.3 kg)   01/06/20 207 lb (93.9 kg)   11/25/19 205 lb 9.6 oz (93.3 kg)       Physical Exam    Assessment & Plan:    Diagnoses and all orders for this visit:    1. Anxiety and depression  Assessment & Plan:  Chronic, improving with no SI  - patient would like to taper down her zoloft; will start taper as requested   - discussed red flag symptoms and reasons to call or go to ED    Orders:  -     sertraline (ZOLOFT) 25 mg tablet; Use in combination with 50mg Zoloft for total daily dose of 75mg  -     sertraline (ZOLOFT) 100 mg tablet; Take 1/2 tab of 100mg in combination with 25mg tab to taper down    2. Severe obesity (BMI 35.0-35.9 with comorbidity) ([facility] [address]  Assessment & Plan:  Chronic, uncontrolled  - discussed medication options and associated risks/benefits/side effects/precautions; patient would like to try Contrave  - discussed medication risks/benefits, precautions, side effects, and reasons to call or go to ED  - RTC 4 weeks    Orders:  -     naltrexone-buPROPion (CONTRAVE) 8-90 mg TbER ER tablet; Week 1 1 tab PO QAM, Week 2 1QAM 1QHS, Week 3 2QAM 1 QHS, Week 4 & beyond 2QAM 2QHS    3. LEONIE on CPAP  Assessment & Plan:  Chronic, stable  - continue treatment with CPAP  - f/u with Sleep Medicine as scheduled          I have discussed the diagnosis with the patient and the intended plan as seen in the above orders. The patient has received an after-visit summary along with patient information handout. I have discussed medication side effects and warnings with the patient as well. Dispostion  Follow-up and Dispositions    · Return in about 4 weeks (around 3/2/2020).            Fabio Urban MD

## 2020-02-03 NOTE — PROGRESS NOTES
Meera Hughes is a 38 y.o. female      Chief Complaint   Patient presents with    Follow-up     1. Have you been to the ER, urgent care clinic since your last visit? Hospitalized since your last visit? No      2. Have you seen or consulted any other health care providers outside of the [address] [place] [address] since your last visit? Include any pap smears or colon screening.  No

## 2020-02-03 NOTE — PATIENT INSTRUCTIONS
Naltrexone/Bupropion (Contrave) - (By mouth) Why this medicine is used:  
Used with diet and exercise to help you lose weight. Contact a nurse or doctor right away if you have: · Blistering, peeling, or red skin rash · Fast, slow, or pounding heartbeat, chest pain, trouble breathing · Thoughts of hurting yourself, depression, agitation or confusion, increased energy · Seeing or hearing things that are not there, racing thoughts, trouble sleeping · Muscle or joint pain, fever with rash, seizures · Dark urine or pale stools, yellow skin or eyes · Eye pain, vision changes, seeing halos around lights · Dry mouth, nausea, vomiting, loss of appetite, stomach pain, diarrhea, constipation © 2017 [facility] Information is for End User's use only and may not be sold, redistributed or otherwise used for commercial purposes. Naltrexone/Bupropion (By mouth) Bupropion Hydrochloride (cab-YZIK-bff-on hannah-droe-KLOR-christy), Naltrexone Hydrochloride (nal-TREX-one hannah-droe-KLOR-christy) Used with diet and exercise to help you lose weight. Brand Name(s): Contrave There may be other brand names for this medicine. When This Medicine Should Not Be Used: This medicine is not right for everyone. Do not use it if you had an allergic reaction to naltrexone or bupropion, you are pregnant, or you have seizures, anorexia, or bulimia. How to Use This Medicine:  
Long Acting Tablet · Take your medicine as directed. Your dose may need to be changed several times to find what works best for you. · Swallow the extended-release tablet whole. Do not crush, break, or chew it. · It is best to take this medicine with food or milk. However, do not take this medicine with high-fat meals. This may increase your risk of seizures. · This medicine should come with a Medication Guide. Ask your pharmacist for a copy if you do not have one.  
· Missed dose: Skip the missed dose and go back to your regular dosing schedule. Never take extra medicine to make up for a missed dose. · Store the medicine in a closed container at room temperature, away from heat, moisture, and direct light. Drugs and Foods to Avoid: Ask your doctor or pharmacist before using any other medicine, including over-the-counter medicines, vitamins, and herbal products. · Do not use this medicine and an MAO inhibitor (MAOI) within 14 days of each other. Do not take this medicine if you are using or have used heroin or other narcotic drugs (including buprenorphine, codeine, methadone, or other habit-forming painkillers) within the past 7 to 10 days. Do not use naltrexone/bupropion if you are also using Zyban® to quit smoking or Aplenzin® or Wellbutrin® for depression, because they also contain bupropion. · Tell your doctor if you take barbiturates, benzodiazepines, antiseizure medicine, or sedatives, or if you have recently stopped taking them. · Some medicines and foods can affect how naltrexone/bupropion works. Tell your doctor if you use any of the following: ¨ Amantadine, amiloride, cimetidine, clopidogrel, dopamine, famotidine, levodopa, memantine, metformin, metoprolol, oxaliplatin, pindolol, ranitidine, theophylline, ticlopidine, varenicline ¨ Insulin or diabetes medicine ¨ Medicine to treat depression ¨ Medicine to treat mental illness (including haloperidol, risperidone, thioridazine) ¨ Medicine to treat heart rhythm problems (including flecainide, procainamide, propafenone) ¨ Medicine to treat HIV or AIDS (including efavirenz, lopinavir, ritonavir) ¨ Medicine for pain, diarrhea, cough, or colds Jaz Steroid medicine · Do not drink alcohol while you are using this medicine. Warnings While Using This Medicine: · It is not safe to take this medicine during pregnancy. It could harm an unborn baby. Tell your doctor right away if you become pregnant.  
· Do not breastfeed while you are using this medicine, unless your doctor says it is okay. · Tell your doctor if you have kidney disease, liver disease, diabetes, glaucoma, heart disease, mental problems (including bipolar disorder), or high blood pressure. Tell your doctor if you have a history of drug addiction or if you drink alcohol. · For some children, teenagers, and young adults, this medicine may increase mental or emotional problems. This may lead to thoughts of suicide and violence. Talk with your doctor right away if you have any thoughts or behavior changes that concern you. Tell your doctor if you or anyone in your family has a history of bipolar disorder or suicide attempts. · This medicine may cause the following problems: 
¨ Increased risk of seizures ¨ High blood pressure or heart rate ¨ Serious allergic and skin reactions ¨ Liver problems, including hepatitis ¨ Changes in mood or behavior ¨ Increased risk of hypoglycemia (low blood sugar) in patients with diabetes · You have a higher risk of accidental overdose, serious injury, or death if you use heroin or any other narcotic medicine while you are being treated with this medicine. Also, naltrexone prevents you from feeling the effects of heroin if you use it. · Do not stop using this medicine suddenly. Your doctor will need to slowly decrease your dose before you stop it completely. · Tell any doctor or dentist who treats you that you are using this medicine. This medicine may affect certain medical test results. · Your doctor will check your progress and the effects of this medicine at regular visits. Keep all appointments. · Keep all medicine out of the reach of children. Never share your medicine with anyone. Possible Side Effects While Using This Medicine:  
Call your doctor right away if you notice any of these side effects: · Allergic reaction: Itching or hives, swelling in your face or hands, swelling or tingling in your mouth or throat, chest tightness, trouble breathing · Blistering, peeling, red skin rash · Chest pain, trouble breathing, fast, slow, or pounding heartbeat · Dark urine or pale stools, nausea, vomiting, loss of appetite, stomach pain, yellow skin or eyes · Eye pain, vision changes, seeing halos around lights · Muscle or joint pain, fever with rash · Seeing or hearing things that are not there, feeling like people are against you · Seizures · Sudden increase in energy, racing thoughts, trouble sleeping · Thoughts of hurting yourself, worsening depression, severe agitation or confusion If you notice these less serious side effects, talk with your doctor: · Dry mouth 
· Headache, dizziness · Nausea, constipation, diarrhea If you notice other side effects that you think are caused by this medicine, tell your doctor. Call your doctor for medical advice about side effects. You may report side effects to FDA at 5-529-FDA-1473 © 2017 [address] Information is for End User's use only and may not be sold, redistributed or otherwise used for commercial purposes. The above information is an  only. It is not intended as medical advice for individual conditions or treatments. Talk to your doctor, nurse or pharmacist before following any medical regimen to see if it is safe and effective for you.

## 2020-02-04 PROBLEM — E66.01 SEVERE OBESITY (BMI 35.0-35.9 WITH COMORBIDITY) (HCC): Status: ACTIVE | Noted: 2020-01-07

## 2020-02-04 RX ORDER — SERTRALINE HYDROCHLORIDE 100 MG/1
TABLET, FILM COATED ORAL
Qty: 90 TAB | Refills: 1 | Status: SHIPPED | OUTPATIENT
Start: 2020-02-04 | End: 2020-07-07 | Stop reason: SDUPTHER

## 2020-02-05 NOTE — ASSESSMENT & PLAN NOTE
Chronic, improving with no SI  - patient would like to taper down her zoloft; will start taper as requested   - discussed red flag symptoms and reasons to call or go to ED

## 2020-02-05 NOTE — ASSESSMENT & PLAN NOTE
Chronic, uncontrolled  - discussed medication options and associated risks/benefits/side effects/precautions; patient would like to try Contrave  - discussed medication risks/benefits, precautions, side effects, and reasons to call or go to ED  - RTC 4 weeks

## 2020-03-02 ENCOUNTER — OFFICE VISIT (OUTPATIENT)
Dept: FAMILY MEDICINE CLINIC | Age: 45
End: 2020-03-02

## 2020-03-02 VITALS
SYSTOLIC BLOOD PRESSURE: 122 MMHG | TEMPERATURE: 98 F | OXYGEN SATURATION: 98 % | BODY MASS INDEX: 39.05 KG/M2 | HEIGHT: 62 IN | HEART RATE: 77 BPM | WEIGHT: 212.2 LBS | DIASTOLIC BLOOD PRESSURE: 78 MMHG | RESPIRATION RATE: 18 BRPM

## 2020-03-02 DIAGNOSIS — F32.A ANXIETY AND DEPRESSION: ICD-10-CM

## 2020-03-02 DIAGNOSIS — E66.01 SEVERE OBESITY (BMI 35.0-35.9 WITH COMORBIDITY) (HCC): ICD-10-CM

## 2020-03-02 DIAGNOSIS — F41.9 ANXIETY AND DEPRESSION: ICD-10-CM

## 2020-03-02 PROBLEM — J30.89 ENVIRONMENTAL AND SEASONAL ALLERGIES: Chronic | Status: ACTIVE | Noted: 2019-11-18

## 2020-03-02 NOTE — PROGRESS NOTES
Juanjo Lock  38 y.o. female  1975  [address] [facility] [address]  [id number]     [address] [facility]ACT[facility]       Encounter Date: 3/2/2020           Established Patient Visit Note: Markel Madrid MD    Reason for Appointment:  Chief Complaint   Patient presents with    Follow-up     4 week follow up       History of Present Illness:  History provided by patient    Juanjo Lock is a 38 y.o. female who presents to clinic today for:       Severe obesity (BMI 35.0-35.9 with comorbidity) ([address]     Medications: none  Duration: several years  BMI: 38.81  Diet: has been trying to eat healthier recently;  Nutrition Evaluation: saw nutritionist in the past (declines today)  Comorbidity: has LEONIE on CPAP  Interval History (03/02/20): patient was prescribed Contrave at last visit but not approved by insurance; garth black in process. Patient would like to discuss options for surgical weight loss. She reports that she plans to start exercising more with the warmer weather.  Anxiety and depression     Duration: diagnosed around 2015  Inciting Factors: during original diagnosis, patient was dealing with a difficult supervisor  Medicine: Zoloft 50mg  Symptoms: poor mood, poor sleep, feeling sad and angry  Suicide: denies any present or past attempts of suicide  Counseling: Agnes Sparrow 1.5 years ago  Interval History (03/02/20): patient reports that she has felt a little more down recently and would like to increase zoloft to 75mg daily         Review of Systems  Review of Systems   Constitutional: Negative for chills and fever. Respiratory: Negative for cough, shortness of breath and wheezing. Cardiovascular: Negative for chest pain and palpitations. Allergies: Patient has no known allergies.     Medications: (Updated to reflect final medication list after visit)    Current Outpatient Medications:     sertraline (ZOLOFT) 100 mg tablet, Take 1/2 tab of 100mg in combination with 25mg tab to taper down (Patient taking differently: Take 50 mg by mouth daily. Take 1/2 tab of 100mg.), Disp: 90 Tab, Rfl: 1    diclofenac (VOLTAREN) 1 % gel, Apply 2 g to affected area two (2) times daily as needed for Pain., Disp: 100 g, Rfl: 2    LO LOESTRIN FE 1 mg-10 mcg (24)/10 mcg (2) tab, TAKE 1 TABLET BY ORAL ROUTE ONCE DAILY, Disp: , Rfl: 4    cholecalciferol (VITAMIN D3) 1,000 unit tablet, Take  by mouth daily. , Disp: , Rfl:     cpap machine kit, by Does Not Apply route., Disp: , Rfl:     SUMAtriptan (IMITREX) 50 mg tablet, Take 1 Tab by mouth once as needed for Migraine (may repeat in 2 hours after initial dose) for up to 1 dose., Disp: 10 Tab, Rfl: 0    fluticasone (FLONASE) 50 mcg/actuation nasal spray, nightly., Disp: , Rfl:     multivitamin (ONE A DAY) tablet, Take 1 Tab by mouth daily. , Disp: , Rfl:     fexofenadine (ALLEGRA) 180 mg tablet, Take 180 mg by mouth daily. , Disp: , Rfl:     sertraline (ZOLOFT) 25 mg tablet, Use in combination with 50mg Zoloft for total daily dose of 75mg, Disp: 30 Tab, Rfl: 0    naltrexone-buPROPion (CONTRAVE) 8-90 mg TbER ER tablet, Week 1 1 tab PO QAM, Week 2 1QAM 1QHS, Week 3 2QAM 1 QHS, Week 4 & beyond 2QAM 2QHS, Disp: 90 Tab, Rfl: 0    meloxicam (MOBIC) 15 mg tablet, TAKE 1 TABLET BY MOUTH EVERY DAY, Disp: , Rfl:     History  Patient Care Team:  Christianne Fairbanks MD as PCP - General (Family Practice)  Christianne Fairbanks MD as PCP - [facility]  Caren Andersen MD (Endocrinology)  Parker Arenas MD as Physician (Obstetrics & Gynecology)    Past Medical History: she has a past medical history of Allergic rhinitis, Infertility, LEONIE on CPAP (4/4/2018), Reactive depression (situational), Situational stress, and Vitamin D deficiency (2/2015). Past Surgical History: she has a past surgical history that includes hx orthopaedic (2010).     Family Medical History: family history includes Asthma in her brother and father; Heart Disease (age of onset: 76) in her father; Letty Fisher (age of onset: 40) in her brother; Obesity in her mother. Social History: she reports that she has never smoked. She has never used smokeless tobacco. She reports current alcohol use. She reports that she does not use drugs. There are no preventive care reminders to display for this patient. Objective:   Visit Vitals  /78 (BP 1 Location: Left arm, BP Patient Position: Sitting)   Pulse 77   Temp 98 °F (36.7 °C) (Oral)   Resp 18   Ht 5' 2\" (1.575 m)   Wt 212 lb 3.2 oz (96.3 kg)   LMP 02/19/2020 (Approximate)   SpO2 98%   BMI 38.81 kg/m²     Wt Readings from Last 3 Encounters:   03/02/20 212 lb 3.2 oz (96.3 kg)   02/03/20 210 lb (95.3 kg)   01/06/20 207 lb (93.9 kg)       Physical Exam  HENT:      Head: Normocephalic and atraumatic. Right Ear: External ear normal.      Left Ear: External ear normal.      Nose: Nose normal.      Mouth/Throat:      Pharynx: No oropharyngeal exudate. Eyes:      General: No scleral icterus. Right eye: No discharge. Left eye: No discharge. Conjunctiva/sclera: Conjunctivae normal.      Pupils: Pupils are equal, round, and reactive to light. Neck:      Musculoskeletal: Normal range of motion and neck supple. Thyroid: No thyromegaly. Vascular: No JVD. Trachea: No tracheal deviation. Cardiovascular:      Rate and Rhythm: Normal rate and regular rhythm. Heart sounds: Normal heart sounds. No murmur. No friction rub. No gallop. Pulmonary:      Effort: Pulmonary effort is normal. No respiratory distress. Breath sounds: Normal breath sounds. No stridor. No wheezing. Abdominal:      General: Bowel sounds are normal. There is no distension. Palpations: Abdomen is soft. There is no mass. Tenderness: There is no abdominal tenderness. There is no guarding or rebound. Musculoskeletal: Normal range of motion.          General: No tenderness or deformity. Lymphadenopathy:      Cervical: No cervical adenopathy. Skin:     General: Skin is warm and dry. Neurological:      Mental Status: She is alert. Cranial Nerves: No cranial nerve deficit. Coordination: Coordination normal.      Gait: Gait is intact. Deep Tendon Reflexes: Reflexes normal.           Assessment & Plan:    Diagnoses and all orders for this visit:    1. Severe obesity (BMI 35.0-35.9 with comorbidity) ([address]  Assessment & Plan:  Chronic, uncontrolled  - will f/u prior auth for contrave  - will provide referral to bariatric surgery to discuss further options  - RTC 6-8 weeks     Orders:  -     REFERRAL TO BARIATRIC SURGERY    2. Anxiety and depression  Assessment & Plan:  Chronic, stable with no SI  - looking into talk therapy  - will increase Zoloft to 75mg   - RTC 6-8 weeks    Orders:  -     REFERRAL TO PSYCHOLOGY        I have discussed the diagnosis with the patient and the intended plan as seen in the above orders. The patient has received an after-visit summary along with patient information handout. I have discussed medication side effects and warnings with the patient as well. Dispostion  Follow-up and Dispositions    · Return in about 3 months (around 6/2/2020).            Dee Edwards MD

## 2020-03-02 NOTE — ASSESSMENT & PLAN NOTE
Chronic, uncontrolled  - will f/u prior auth for contrave  - will provide referral to bariatric surgery to discuss further options  - RTC 6-8 weeks

## 2020-03-02 NOTE — PATIENT INSTRUCTIONS
Learning About Bariatric Surgery What is bariatric surgery? Bariatric surgery is surgery to help you lose weight. This type of surgery is only used for people who are very overweight and have not been able to lose weight with diet and exercise. This surgery makes the stomach smaller. Some types of surgery also change the connection between your stomach and intestines. How is bariatric surgery done? Bariatric surgery may be either \"open\" or \"laparoscopic. \" Open surgery is done through a large cut (incision) in the belly. Laparoscopic surgery is done through several small cuts. The doctor puts a lighted tube, or scope, and other surgical tools through small cuts in your belly. The doctor is able to see your organs with the scope. There are different types of bariatric surgery. Gastric sleeve surgery The surgery is usually done through several small incisions in the belly. The doctor removes more than half of your stomach. This leaves a thin sleeve, or tube, that is about the size of a banana. Because part of your stomach has been removed, this can't be reversed. Blaine-en-Y gastric bypass surgery Blaine-en-Y (say \"sigrid-en-why\") surgery changes the connection between the stomach and the intestines. The doctor separates a section of your stomach from the rest of your stomach. This makes a small pouch. The new pouch will hold the food you eat. The doctor connects the stomach pouch to the middle part of the small intestine. Gastric banding surgery The surgery is usually done through several small incisions in the belly. The doctor wraps a band around the upper part of the stomach. This creates a small pouch. The small size of the pouch means that you will get full after you eat just a small amount of food. The doctor can inflate or deflate the band to adjust the size. This lets the doctor adjust how quickly food passes from the new pouch into the stomach.  It does not change the connection between the stomach and the intestines. What can you expect after the surgery? You may stay in the hospital for one or more days after the surgery. How long you stay depends on the type of surgery you had. Most people need 2 to 4 weeks before they are ready to get back to their usual routine. For the first 2 to 6 weeks after surgery, you probably will need to follow a liquid or soft diet. Bit by bit, you will be able to eat more solid foods. Your doctor may advise you to work with a dietitian. This way you'll be sure to get enough protein, vitamins, and minerals while you are losing weight. Even with a healthy diet, you may need to take vitamin and mineral supplements. After surgery, you will not be able to eat very much at one time. You will get full quickly. Try not to eat too much at one time or eat foods that are high in fat or sugar. If you do, you may vomit, get stomach pain, or have diarrhea. You probably will lose weight very quickly in the first few months after surgery. As time goes on, your weight loss will slow down. You will have regular doctor visits to check how you are doing. Think of bariatric surgery as a tool to help you lose weight. It isn't an instant fix. You will still need to eat a healthy diet and get regular exercise. This will help you reach your weight goal and avoid regaining the weight you lose. Follow-up care is a key part of your treatment and safety. Be sure to make and go to all appointments, and call your doctor if you are having problems. It's also a good idea to know your test results and keep a list of the medicines you take. Where can you learn more? Go to http://go-juani.info/. Enter G469 in the search box to learn more about \"Learning About Bariatric Surgery. \" Current as of: March 28, 2019 Content Version: 12.2 © 4335-4462 [employer].  Care instructions adapted under license by [address] (which disclaims liability or warranty for this information). If you have questions about a medical condition or this instruction, always ask your healthcare professional. [address] any warranty or liability for your use of this information.

## 2020-03-02 NOTE — PROGRESS NOTES
Chief Complaint   Patient presents with    Follow-up     4 week follow up     1. Have you been to the ER, urgent care clinic since your last visit? Hospitalized since your last visit? No    2. Have you seen or consulted any other health care providers outside of the [address] [place] [address] since your last visit? Include any pap smears or colon screening.  No

## 2020-03-02 NOTE — ASSESSMENT & PLAN NOTE
Chronic, stable with no SI  - looking into talk therapy  - will increase Zoloft to 75mg   - RTC 6-8 weeks

## 2020-03-09 NOTE — PROGRESS NOTES
[facility]  [address]  [place], [address] [facility]  Phone: [phone]  Fax: [phone]    Discharge Summary  2-15    Patient name: Darrin Norman  : 1975  Provider#:[id number]  Referral source: Carl Marmolejo MD      Medical/Treatment Diagnosis: Low back pain [M54.5]     Prior Hospitalization: see medical history     Comorbidities: see chart  Prior Level of Function:see chart  Medications: Verified on Patient Summary List    Start of Care: 19      Onset Date:    Visits from Start of Care: 7     Missed Visits: 0  Reporting Period : 19 to 2020      ASSESSMENT/SUMMARY OF CARE: Pt made very good progress w/ PT and is now able to function pain free. Pt has been given updated HEP and returning to MD if questions arise. Pt will be d/c'd from our care at this time.     RECOMMENDATIONS:  [x]Discontinue therapy: [x]Patient has reached or is progressing toward set goals      []Patient is non-compliant or has abdicated      []Due to lack of appreciable progress towards set goals    Vasyl Mcdermott, PT, DPT, OCS 3/9/2020

## 2020-04-22 DIAGNOSIS — F32.A ANXIETY AND DEPRESSION: ICD-10-CM

## 2020-04-22 DIAGNOSIS — F41.9 ANXIETY AND DEPRESSION: ICD-10-CM

## 2020-04-22 NOTE — TELEPHONE ENCOUNTER
MD Jada Bobo, not sure if this order still current. Patient tapering. Request for refill. Please review.     Last Visit: 3/2/20  MD Jada Bobo  Next Appointment: 6/3/20  MD Jada Bobo  Previous Refill Encounter(s): 2/3/20  #30 (for taper)    Requested Prescriptions     Pending Prescriptions Disp Refills    sertraline (ZOLOFT) 25 mg tablet 30 Tab 0     Sig: Use in combination with 50mg Zoloft for total daily dose of 75mg

## 2020-04-23 RX ORDER — SERTRALINE HYDROCHLORIDE 25 MG/1
TABLET, FILM COATED ORAL
Qty: 30 TAB | Refills: 2 | Status: SHIPPED | OUTPATIENT
Start: 2020-04-23 | End: 2020-06-04

## 2020-06-03 ENCOUNTER — VIRTUAL VISIT (OUTPATIENT)
Dept: FAMILY MEDICINE CLINIC | Age: 45
End: 2020-06-03

## 2020-06-03 NOTE — PROGRESS NOTES
Patient unable to get virtual visit working. Will reschedule for another visit and patient will try to get mychart and doxy. me working prior to visit.      Mp Jain MD    NO LOS AS PATIENT UNABLE TO GET VISIT WORKING

## 2020-06-04 ENCOUNTER — VIRTUAL VISIT (OUTPATIENT)
Dept: FAMILY MEDICINE CLINIC | Age: 45
End: 2020-06-04

## 2020-06-04 DIAGNOSIS — Z99.89 OSA ON CPAP: ICD-10-CM

## 2020-06-04 DIAGNOSIS — E66.01 SEVERE OBESITY (BMI 35.0-35.9 WITH COMORBIDITY) (HCC): Primary | ICD-10-CM

## 2020-06-04 DIAGNOSIS — G47.33 OSA ON CPAP: ICD-10-CM

## 2020-06-04 DIAGNOSIS — F32.A ANXIETY AND DEPRESSION: ICD-10-CM

## 2020-06-04 DIAGNOSIS — F41.9 ANXIETY AND DEPRESSION: ICD-10-CM

## 2020-06-04 RX ORDER — BUPROPION HYDROCHLORIDE 150 MG/1
150 TABLET ORAL
Qty: 30 TAB | Refills: 0 | Status: SHIPPED | OUTPATIENT
Start: 2020-06-04 | End: 2020-06-29 | Stop reason: SDUPTHER

## 2020-06-04 NOTE — PROGRESS NOTES
Sheila Kendall  41 y.o. female  1975  [address] [facility] [address]  [id number]     [address] [place] [address] PRACTICE       Encounter Date: 6/4/2020           Established Patient Visit Note: Feliberto Posada MD    Reason for Appointment:  Chief Complaint   Patient presents with    Follow-up       History of Present Illness:  History provided by patient    Sheila Kendall is a 41 y.o. female who presents today for:    Severe Obesity  Medications: none  Prior Medications: Prescribed contrave in the past, but not approved by insurance  Duration: several years  Weight/BMI: 217.4  Comorbidity: has LEONIE on CPAP  Interval History: reports 20 minutes of working out 5 days per week with workout DVD and walking with dog 20-30 minutes daily    Anxiety and Depression  Duration: diagnosed around 2015  Inciting Factors: during original diagnosis, patient was dealing with a difficult supervisor  Medicine: Zoloft 50mg  Symptoms: poor mood, poor sleep, feeling sad and angry  Suicide: denies any present or past attempts of suicide  Counseling: Andi Ambriz 1.5 years ago  Interval History: reports increased stress with pandemic and world events, taking 50mg Zoloft, working from home    LEONIE: wearning CPAP    Review of Systems  Review of Systems   Constitutional: Negative for chills and fever. Respiratory: Negative for cough, shortness of breath and wheezing. Cardiovascular: Negative for chest pain and palpitations. Allergies: Patient has no known allergies. Medications: (Updated to reflect final medication list after visit)    Current Outpatient Medications:     buPROPion XL (WELLBUTRIN XL) 150 mg tablet, Take 1 Tab by mouth every morning., Disp: 30 Tab, Rfl: 0    sertraline (ZOLOFT) 100 mg tablet, Take 1/2 tab of 100mg in combination with 25mg tab to taper down (Patient taking differently: Take 50 mg by mouth daily.  Take 1/2 tab of 100mg.), Disp: 90 Tab, Rfl: 1    meloxicam (MOBIC) 15 mg tablet, TAKE 1 TABLET BY MOUTH EVERY DAY, Disp: , Rfl:     diclofenac (VOLTAREN) 1 % gel, Apply 2 g to affected area two (2) times daily as needed for Pain., Disp: 100 g, Rfl: 2    LO LOESTRIN FE 1 mg-10 mcg (24)/10 mcg (2) tab, TAKE 1 TABLET BY ORAL ROUTE ONCE DAILY, Disp: , Rfl: 4    cholecalciferol (VITAMIN D3) 1,000 unit tablet, Take  by mouth daily. , Disp: , Rfl:     cpap machine kit, by Does Not Apply route., Disp: , Rfl:     SUMAtriptan (IMITREX) 50 mg tablet, Take 1 Tab by mouth once as needed for Migraine (may repeat in 2 hours after initial dose) for up to 1 dose., Disp: 10 Tab, Rfl: 0    fluticasone (FLONASE) 50 mcg/actuation nasal spray, nightly., Disp: , Rfl:     multivitamin (ONE A DAY) tablet, Take 1 Tab by mouth daily. , Disp: , Rfl:     fexofenadine (ALLEGRA) 180 mg tablet, Take 180 mg by mouth daily. , Disp: , Rfl:     History  Patient Care Team:  Osman Souza MD as PCP - General (Family Practice)  Osman Souza MD as PCP - [facility]  Lauryn Mobley MD (Endocrinology)  Sage Bai MD as Physician (Obstetrics & Gynecology)    Past Medical History: she has a past medical history of Allergic rhinitis, Infertility, LEONIE on CPAP (4/4/2018), Reactive depression (situational), Situational stress, and Vitamin D deficiency (2/2015). Past Surgical History: she has a past surgical history that includes hx orthopaedic (2010). Family Medical History: family history includes Asthma in her brother and father; Heart Disease (age of onset: 76) in her father; Edyth Jennifer (age of onset: 40) in her brother; Obesity in her mother. Social History: she reports that she has never smoked. She has never used smokeless tobacco. She reports current alcohol use. She reports that she does not use drugs. Objective:   Vital Signs  There were no vitals taken for this visit.   Unable to obtain full set of vital signs today as patient does not have all equipment for this at home    Physical Exam  Constitutional:       Appearance: Normal appearance. She is well-groomed and normal weight. Eyes:      General: Lids are normal. Vision grossly intact. Gaze aligned appropriately. Conjunctiva/sclera:      Right eye: Right conjunctiva is not injected. Left eye: Left conjunctiva is not injected. Neck:      Musculoskeletal: Full passive range of motion without pain and normal range of motion. Pulmonary:      Effort: Pulmonary effort is normal. No tachypnea, bradypnea, accessory muscle usage or respiratory distress. Skin:     Coloration: Skin is not ashen, cyanotic, jaundiced or mottled. Neurological:      General: No focal deficit present. Mental Status: She is alert. Mental status is at baseline. Psychiatric:         Attention and Perception: Attention and perception normal.         Mood and Affect: Mood and affect normal.         Speech: Speech normal.         Behavior: Behavior normal. Behavior is cooperative. Assessment & Plan:      ICD-10-CM ICD-9-CM    1. Severe obesity (BMI 35.0-35.9 with comorbidity) ([facility]) E66.01 278.01     Z68.35 V85.35    2. Anxiety and depression F41.9 300.00 buPROPion XL (WELLBUTRIN XL) 150 mg tablet    F32.9 311    3. LEONIE on CPAP G47.33 327.23     Z99.89 V46.8      -Obesity: Chronic, patient working on diet and exercise; I have reviewed/discussed the above normal BMI with the patient. I have recommended the following interventions: dietary management education, guidance, and counseling, encourage exercise, monitor weight and prescribed dietary intake. -Anxiety and Depression: Chronic, worsened by recent events; discussed medication options and associated risks/benefits/side effects/precautions; patient would like to try Bupropion; will prescribe. discussed red flag symptoms and reasons to call or go to ED  - LEONIE: chronic, will controlled on CPAP        I was in the office while conducting this encounter.     Consent:  She and/or her healthcare decision maker is aware that this patient-initiated Telehealth encounter is a billable service, with coverage as determined by her insurance carrier. She is aware that she may receive a bill and has provided verbal consent to proceed: Yes    This virtual visit was conducted via Doxy. me. Pursuant to the emergency declaration under the [facility] [place] [address] [facility] authority and the [employer] Act, this Virtual  Visit was conducted to reduce the patient's risk of exposure to COVID-19 and provide continuity of care for an established patient. Services were provided through a video synchronous discussion virtually to substitute for in-person clinic visit. Due to this being a TeleHealth evaluation, many elements of the physical examination are unable to be assessed. Total Time: minutes: 11-20 minutes. I have discussed the diagnosis with the patient and the intended plan as seen in the above orders. The patient has received an after-visit summary along with patient information handout. I have discussed medication side effects and warnings with the patient as well. Disposition  Follow-up and Dispositions    · Return in about 4 weeks (around 7/2/2020).            Li Craven MD

## 2020-06-29 DIAGNOSIS — F41.9 ANXIETY AND DEPRESSION: ICD-10-CM

## 2020-06-29 DIAGNOSIS — F32.A ANXIETY AND DEPRESSION: ICD-10-CM

## 2020-06-29 RX ORDER — BUPROPION HYDROCHLORIDE 150 MG/1
150 TABLET ORAL
Qty: 90 TAB | Refills: 1 | Status: SHIPPED | OUTPATIENT
Start: 2020-06-29 | End: 2020-07-07 | Stop reason: SDUPTHER

## 2020-06-29 NOTE — TELEPHONE ENCOUNTER
Request for 90 d/s    Last Visit: VJAMAAL 6/4/20  MD Tenisha Cowan  Next Appointment: 7/7/20  MD Tenisha Cowan  Previous Refill Encounter(s): 6/4/20  #30    Requested Prescriptions     Pending Prescriptions Disp Refills    buPROPion XL (WELLBUTRIN XL) 150 mg tablet 90 Tab 1     Sig: Take 1 Tab by mouth every morning.

## 2020-07-07 ENCOUNTER — VIRTUAL VISIT (OUTPATIENT)
Dept: FAMILY MEDICINE CLINIC | Age: 45
End: 2020-07-07

## 2020-07-07 DIAGNOSIS — E78.2 MIXED HYPERLIPIDEMIA: ICD-10-CM

## 2020-07-07 DIAGNOSIS — M25.50 MULTIPLE JOINT PAIN: ICD-10-CM

## 2020-07-07 DIAGNOSIS — F32.A ANXIETY AND DEPRESSION: Primary | ICD-10-CM

## 2020-07-07 DIAGNOSIS — R53.83 FATIGUE, UNSPECIFIED TYPE: ICD-10-CM

## 2020-07-07 DIAGNOSIS — E66.01 SEVERE OBESITY (BMI 35.0-35.9 WITH COMORBIDITY) (HCC): ICD-10-CM

## 2020-07-07 DIAGNOSIS — R73.01 ELEVATED FASTING BLOOD SUGAR: ICD-10-CM

## 2020-07-07 DIAGNOSIS — E55.9 VITAMIN D DEFICIENCY: ICD-10-CM

## 2020-07-07 DIAGNOSIS — F41.9 ANXIETY AND DEPRESSION: Primary | ICD-10-CM

## 2020-07-07 RX ORDER — SERTRALINE HYDROCHLORIDE 50 MG/1
50 TABLET, FILM COATED ORAL DAILY
Qty: 90 TAB | Refills: 1 | Status: SHIPPED | OUTPATIENT
Start: 2020-07-07 | End: 2021-07-20

## 2020-07-07 RX ORDER — BUPROPION HYDROCHLORIDE 150 MG/1
150 TABLET ORAL
Qty: 90 TAB | Refills: 1 | Status: SHIPPED | OUTPATIENT
Start: 2020-07-07 | End: 2021-03-25 | Stop reason: SDUPTHER

## 2020-07-07 NOTE — PROGRESS NOTES
Lonny Teran  41 y.o. female  1975  [address] [facility] [address]  [id number]     [address] [facility]       Encounter Date: 7/7/2020           Established Patient Visit Note: Kurt Guzmán MD    Reason for Appointment:  Chief Complaint   Patient presents with    Follow-up       History of Present Illness:  History provided by patient    Lonny Teran is a 41 y.o. female who presents today for:    Severe Obesity  Medications: none  Prior Medications: Prescribed contrave in the past, but not approved by insurance  Duration: several years  Weight/BMI: 217.4  Comorbidity: has LEONIE on CPAP  Interval History: reports 20 minutes of working out 5 days per week with workout DVD and walking with dog 20-30 minutes daily    Anxiety and Depression  Duration: diagnosed around 2015  Inciting Factors: during original diagnosis, patient was dealing with a difficult supervisor  Medicine: Zoloft 50mg, Wellbutrin 150mg daily  Symptoms: poor mood, poor sleep, feeling sad and angry  Suicide: denies any present or past attempts of suicide  Counseling: Janice Omer 1.5 years ago  Interval History: Patient reports that mood is holding steady with the Bupropion. She has noticed a little bid of stomach upset and diarrhea, but she reports not overly severe. She is tapering off of sertraline. She has been exercising more recently. She has lost 4 lbs. She reports that she is now having pain in the other hand (right hand). Review of Systems  Review of Systems   Constitutional: Negative for chills and fever. Respiratory: Negative for cough, shortness of breath and wheezing. Cardiovascular: Negative for chest pain and palpitations. Allergies: Patient has no known allergies.     Medications: (Updated to reflect final medication list after visit)    Current Outpatient Medications:     buPROPion XL (WELLBUTRIN XL) 150 mg tablet, Take 1 Tab by mouth every morning., Disp: 90 Tab, Rfl: 1    sertraline (ZOLOFT) 50 mg tablet, Take 1 Tab by mouth daily. , Disp: 90 Tab, Rfl: 1    meloxicam (MOBIC) 15 mg tablet, TAKE 1 TABLET BY MOUTH EVERY DAY, Disp: , Rfl:     diclofenac (VOLTAREN) 1 % gel, Apply 2 g to affected area two (2) times daily as needed for Pain., Disp: 100 g, Rfl: 2    LO LOESTRIN FE 1 mg-10 mcg (24)/10 mcg (2) tab, TAKE 1 TABLET BY ORAL ROUTE ONCE DAILY, Disp: , Rfl: 4    cholecalciferol (VITAMIN D3) 1,000 unit tablet, Take  by mouth daily. , Disp: , Rfl:     cpap machine kit, by Does Not Apply route., Disp: , Rfl:     SUMAtriptan (IMITREX) 50 mg tablet, Take 1 Tab by mouth once as needed for Migraine (may repeat in 2 hours after initial dose) for up to 1 dose., Disp: 10 Tab, Rfl: 0    fluticasone (FLONASE) 50 mcg/actuation nasal spray, nightly., Disp: , Rfl:     multivitamin (ONE A DAY) tablet, Take 1 Tab by mouth daily. , Disp: , Rfl:     fexofenadine (ALLEGRA) 180 mg tablet, Take 180 mg by mouth daily. , Disp: , Rfl:     History  Patient Care Team:  Aster Lawler MD as PCP - General (Family Practice)  Aster Lawler MD as PCP - [facility]  Alvino Recinos MD (Endocrinology)  Poonam Howell MD as Physician (Obstetrics & Gynecology)    Past Medical History: she has a past medical history of Allergic rhinitis, Infertility, LEONIE on CPAP (4/4/2018), Reactive depression (situational), Situational stress, and Vitamin D deficiency (2/2015). Past Surgical History: she has a past surgical history that includes hx orthopaedic (2010). Family Medical History: family history includes Asthma in her brother and father; Heart Disease (age of onset: 76) in her father; Bishop Banco (age of onset: 40) in her brother; Obesity in her mother. Social History: she reports that she has never smoked. She has never used smokeless tobacco. She reports current alcohol use. She reports that she does not use drugs.       Objective:   Vital Signs  There were no vitals taken for this visit. Unable to obtain full set of vital signs today as patient does not have all equipment for this at home    Physical Exam  Constitutional:       Appearance: Normal appearance. She is well-groomed and normal weight. Eyes:      General: Lids are normal. Vision grossly intact. Gaze aligned appropriately. Conjunctiva/sclera:      Right eye: Right conjunctiva is not injected. Left eye: Left conjunctiva is not injected. Neck:      Musculoskeletal: Full passive range of motion without pain and normal range of motion. Pulmonary:      Effort: Pulmonary effort is normal. No tachypnea, bradypnea, accessory muscle usage or respiratory distress. Skin:     Coloration: Skin is not ashen, cyanotic, jaundiced or mottled. Neurological:      General: No focal deficit present. Mental Status: She is alert. Mental status is at baseline. Psychiatric:         Attention and Perception: Attention and perception normal.         Mood and Affect: Mood and affect normal.         Speech: Speech normal.         Behavior: Behavior normal. Behavior is cooperative. Assessment & Plan:      ICD-10-CM ICD-9-CM    1. Anxiety and depression  F41.9 300.00 buPROPion XL (WELLBUTRIN XL) 150 mg tablet    F32.9 311 sertraline (ZOLOFT) 50 mg tablet   2. Severe obesity (BMI 35.0-35.9 with comorbidity) (HCC)  E66.01 278.01     Z68.35 V85.35    3. Multiple joint pain  M25.50 719.49 DARSHANA BY MULTIPLEX FLOW IA, QL   4. Elevated fasting blood sugar  R73.01 790.21 HEMOGLOBIN A1C WITH EAG   5. Vitamin D deficiency  E55.9 268.9 VITAMIN D, 25 HYDROXY   6. Fatigue, unspecified type  R53.83 780.79 TSH 3RD GENERATION      CBC WITH AUTOMATED DIFF      VITAMIN D, 25 HYDROXY   7. Mixed hyperlipidemia  S39.0 954.6 METABOLIC PANEL, COMPREHENSIVE      LIPID PANEL     Anxiety and Depression: Chronic, improved on current regimen will continue.  Patient denies SI; discussed red flag symptoms and reasons to call or go to ED  All other conditions : chronic and stable. Will continue current treatment regimen. Will check labs as reflected above. Discussed recommendations for diet and exercise. I was in the office while conducting this encounter. Consent:  She and/or her healthcare decision maker is aware that this patient-initiated Telehealth encounter is a billable service, with coverage as determined by her insurance carrier. She is aware that she may receive a bill and has provided verbal consent to proceed: Yes    This virtual visit was conducted via [employer]. Pursuant to the emergency declaration under the [facility], [facility] waiver authority and the [employer] Act, this Virtual  Visit was conducted to reduce the patient's risk of exposure to COVID-19 and provide continuity of care for an established patient. Services were provided through a video synchronous discussion virtually to substitute for in-person clinic visit. Due to this being a TeleHealth evaluation, many elements of the physical examination are unable to be assessed. Total Time: minutes: 11-20 minutes. I have discussed the diagnosis with the patient and the intended plan as seen in the above orders. The patient has received an after-visit summary along with patient information handout. I have discussed medication side effects and warnings with the patient as well. Disposition  Follow-up and Dispositions    · Return in about 3 months (around 10/7/2020).            Reymundo Ortiz MD

## 2020-08-28 ENCOUNTER — TELEPHONE (OUTPATIENT)
Dept: FAMILY MEDICINE CLINIC | Age: 45
End: 2020-08-28

## 2020-08-28 NOTE — TELEPHONE ENCOUNTER
Called pt to let her know that it would be best to go to [facility] to get her labs. I printed off the order along with a site of [employer]. Let her know that [employer] is doing walk ins.    ----- Message from [employer] Ryan Pinto sent at 8/28/2020 10:15 AM EDT -----  Regarding: Visit Follow-Up Question  Contact: [phone]  What do I need to do to schedule the labs we talked about last visit again?         Thanks,  [employer]

## 2020-09-26 LAB
25(OH)D3+25(OH)D2 SERPL-MCNC: 37.4 NG/ML (ref 30–100)
ALBUMIN SERPL-MCNC: 4.5 G/DL (ref 3.8–4.8)
ALBUMIN/GLOB SERPL: 1.9 {RATIO} (ref 1.2–2.2)
ALP SERPL-CCNC: 75 IU/L (ref 39–117)
ALT SERPL-CCNC: 54 IU/L (ref 0–32)
ANA SER QL: NEGATIVE
AST SERPL-CCNC: 34 IU/L (ref 0–40)
BASOPHILS # BLD AUTO: 0 X10E3/UL (ref 0–0.2)
BASOPHILS NFR BLD AUTO: 0 %
BILIRUB SERPL-MCNC: 0.3 MG/DL (ref 0–1.2)
BUN SERPL-MCNC: 11 MG/DL (ref 6–24)
BUN/CREAT SERPL: 15 (ref 9–23)
CALCIUM SERPL-MCNC: 9.4 MG/DL (ref 8.7–10.2)
CHLORIDE SERPL-SCNC: 99 MMOL/L (ref 96–106)
CHOLEST SERPL-MCNC: 216 MG/DL (ref 100–199)
CO2 SERPL-SCNC: 22 MMOL/L (ref 20–29)
CREAT SERPL-MCNC: 0.73 MG/DL (ref 0.57–1)
EOSINOPHIL # BLD AUTO: 0.2 X10E3/UL (ref 0–0.4)
EOSINOPHIL NFR BLD AUTO: 3 %
ERYTHROCYTE [DISTWIDTH] IN BLOOD BY AUTOMATED COUNT: 13 % (ref 11.7–15.4)
EST. AVERAGE GLUCOSE BLD GHB EST-MCNC: 120 MG/DL
GLOBULIN SER CALC-MCNC: 2.4 G/DL (ref 1.5–4.5)
GLUCOSE SERPL-MCNC: 90 MG/DL (ref 65–99)
HBA1C MFR BLD: 5.8 % (ref 4.8–5.6)
HCT VFR BLD AUTO: 39 % (ref 34–46.6)
HDLC SERPL-MCNC: 45 MG/DL
HGB BLD-MCNC: 13.4 G/DL (ref 11.1–15.9)
IMM GRANULOCYTES # BLD AUTO: 0 X10E3/UL (ref 0–0.1)
IMM GRANULOCYTES NFR BLD AUTO: 0 %
INTERPRETATION, 910389: NORMAL
LDLC SERPL CALC-MCNC: 133 MG/DL (ref 0–99)
LYMPHOCYTES # BLD AUTO: 2.8 X10E3/UL (ref 0.7–3.1)
LYMPHOCYTES NFR BLD AUTO: 28 %
MCH RBC QN AUTO: 30.6 PG (ref 26.6–33)
MCHC RBC AUTO-ENTMCNC: 34.4 G/DL (ref 31.5–35.7)
MCV RBC AUTO: 89 FL (ref 79–97)
MONOCYTES # BLD AUTO: 0.7 X10E3/UL (ref 0.1–0.9)
MONOCYTES NFR BLD AUTO: 7 %
NEUTROPHILS # BLD AUTO: 6 X10E3/UL (ref 1.4–7)
NEUTROPHILS NFR BLD AUTO: 62 %
PLATELET # BLD AUTO: 311 X10E3/UL (ref 150–450)
POTASSIUM SERPL-SCNC: 4 MMOL/L (ref 3.5–5.2)
PROT SERPL-MCNC: 6.9 G/DL (ref 6–8.5)
RBC # BLD AUTO: 4.38 X10E6/UL (ref 3.77–5.28)
SODIUM SERPL-SCNC: 136 MMOL/L (ref 134–144)
TRIGL SERPL-MCNC: 212 MG/DL (ref 0–149)
TSH SERPL DL<=0.005 MIU/L-ACNC: 3.63 UIU/ML (ref 0.45–4.5)
VLDLC SERPL CALC-MCNC: 38 MG/DL (ref 5–40)
WBC # BLD AUTO: 9.8 X10E3/UL (ref 3.4–10.8)

## 2020-10-02 ENCOUNTER — VIRTUAL VISIT (OUTPATIENT)
Dept: FAMILY MEDICINE CLINIC | Age: 45
End: 2020-10-02
Payer: COMMERCIAL

## 2020-10-02 DIAGNOSIS — R74.01 ELEVATED ALT MEASUREMENT: ICD-10-CM

## 2020-10-02 DIAGNOSIS — E78.2 MIXED HYPERLIPIDEMIA: ICD-10-CM

## 2020-10-02 DIAGNOSIS — F41.9 ANXIETY AND DEPRESSION: Primary | ICD-10-CM

## 2020-10-02 DIAGNOSIS — F32.A ANXIETY AND DEPRESSION: Primary | ICD-10-CM

## 2020-10-02 DIAGNOSIS — R73.03 PREDIABETES: ICD-10-CM

## 2020-10-02 PROCEDURE — 99213 OFFICE O/P EST LOW 20 MIN: CPT | Performed by: FAMILY MEDICINE

## 2020-10-07 PROBLEM — E78.2 MIXED HYPERLIPIDEMIA: Status: ACTIVE | Noted: 2020-10-07

## 2020-10-07 PROBLEM — R73.03 PREDIABETES: Status: ACTIVE | Noted: 2020-10-07

## 2020-10-07 PROBLEM — R74.01 ELEVATED ALT MEASUREMENT: Status: ACTIVE | Noted: 2020-10-07

## 2021-03-25 DIAGNOSIS — F32.A ANXIETY AND DEPRESSION: ICD-10-CM

## 2021-03-25 DIAGNOSIS — F41.9 ANXIETY AND DEPRESSION: ICD-10-CM

## 2021-03-25 RX ORDER — BUPROPION HYDROCHLORIDE 150 MG/1
150 TABLET ORAL
Qty: 90 TAB | Refills: 0 | Status: SHIPPED | OUTPATIENT
Start: 2021-03-25 | End: 2021-06-16 | Stop reason: SDUPTHER

## 2021-03-25 NOTE — TELEPHONE ENCOUNTER
Last visit 10/02/2020 Virtual visit MD Shawn Villalta   Next appointment 3 months (01/2021) - Nothing scheduled   Previous refill encounter(s)   07/07/2020 Wellbutrin-XL #90 with 1 refill     Requested Prescriptions     Pending Prescriptions Disp Refills    buPROPion XL (WELLBUTRIN XL) 150 mg tablet 90 Tab 0     Sig: Take 1 Tab by mouth every morning.

## 2021-06-16 DIAGNOSIS — F41.9 ANXIETY AND DEPRESSION: ICD-10-CM

## 2021-06-16 DIAGNOSIS — F32.A ANXIETY AND DEPRESSION: ICD-10-CM

## 2021-06-16 RX ORDER — BUPROPION HYDROCHLORIDE 150 MG/1
150 TABLET ORAL
Qty: 90 TABLET | Refills: 0 | Status: SHIPPED | OUTPATIENT
Start: 2021-06-16 | End: 2021-09-13 | Stop reason: SDUPTHER

## 2021-06-16 NOTE — TELEPHONE ENCOUNTER
Last Visit: VV 10/2/20 MD Heber Nieves  Next Appointment: 7/20/212 MD Heber Nieves  Previous Refill Encounter(s): 3/25/21 90    Requested Prescriptions     Pending Prescriptions Disp Refills    buPROPion XL (WELLBUTRIN XL) 150 mg tablet 90 Tablet 0     Sig: Take 1 Tablet by mouth every morning.

## 2021-07-20 ENCOUNTER — OFFICE VISIT (OUTPATIENT)
Dept: FAMILY MEDICINE CLINIC | Age: 46
End: 2021-07-20

## 2021-07-20 VITALS
TEMPERATURE: 98.2 F | HEART RATE: 100 BPM | DIASTOLIC BLOOD PRESSURE: 72 MMHG | HEIGHT: 61 IN | SYSTOLIC BLOOD PRESSURE: 124 MMHG | BODY MASS INDEX: 38.29 KG/M2 | RESPIRATION RATE: 16 BRPM | WEIGHT: 202.8 LBS | OXYGEN SATURATION: 98 %

## 2021-07-20 DIAGNOSIS — R74.01 ELEVATED ALT MEASUREMENT: ICD-10-CM

## 2021-07-20 DIAGNOSIS — F32.A ANXIETY AND DEPRESSION: ICD-10-CM

## 2021-07-20 DIAGNOSIS — M19.141 POST-TRAUMATIC OSTEOARTHRITIS OF RIGHT HAND: ICD-10-CM

## 2021-07-20 DIAGNOSIS — G47.33 OSA ON CPAP: ICD-10-CM

## 2021-07-20 DIAGNOSIS — E78.2 MIXED HYPERLIPIDEMIA: ICD-10-CM

## 2021-07-20 DIAGNOSIS — E55.9 VITAMIN D DEFICIENCY: ICD-10-CM

## 2021-07-20 DIAGNOSIS — J30.89 ENVIRONMENTAL AND SEASONAL ALLERGIES: Chronic | ICD-10-CM

## 2021-07-20 DIAGNOSIS — M25.561 CHRONIC PAIN OF RIGHT KNEE: ICD-10-CM

## 2021-07-20 DIAGNOSIS — Z00.00 ANNUAL PHYSICAL EXAM: Primary | ICD-10-CM

## 2021-07-20 DIAGNOSIS — R73.03 PREDIABETES: ICD-10-CM

## 2021-07-20 DIAGNOSIS — E66.01 SEVERE OBESITY (BMI 35.0-35.9 WITH COMORBIDITY) (HCC): ICD-10-CM

## 2021-07-20 DIAGNOSIS — K13.79 MOUTH SORES: ICD-10-CM

## 2021-07-20 DIAGNOSIS — G89.29 CHRONIC PAIN OF RIGHT KNEE: ICD-10-CM

## 2021-07-20 DIAGNOSIS — G43.909 MIGRAINE WITHOUT STATUS MIGRAINOSUS, NOT INTRACTABLE, UNSPECIFIED MIGRAINE TYPE: ICD-10-CM

## 2021-07-20 DIAGNOSIS — F41.9 ANXIETY AND DEPRESSION: ICD-10-CM

## 2021-07-20 DIAGNOSIS — M54.6 CHRONIC BILATERAL THORACIC BACK PAIN: ICD-10-CM

## 2021-07-20 DIAGNOSIS — Z99.89 OSA ON CPAP: ICD-10-CM

## 2021-07-20 DIAGNOSIS — Z13.0 SCREENING FOR DEFICIENCY ANEMIA: ICD-10-CM

## 2021-07-20 DIAGNOSIS — G89.29 CHRONIC BILATERAL THORACIC BACK PAIN: ICD-10-CM

## 2021-07-20 PROCEDURE — 99396 PREV VISIT EST AGE 40-64: CPT | Performed by: FAMILY MEDICINE

## 2021-07-20 NOTE — PROGRESS NOTES
Winnie Jaime  40 y.o. female  1975  [address] [facility] [address] [address]       Encounter Date: 7/20/2021           Established Patient Visit Note: Fely Sr MD    Reason for Appointment:  Chief Complaint   Patient presents with    Complete Physical     not fasting       History of Present Illness:  History provided by patient    Winnie Jaime is a 40 y.o. female who presents to clinic today for:      Annual Physical  Last Physical: > 1 year  Screenings: she is due for colonoscopy (she will call her insurance to see if it is covered), female health screening with [facility]  Immunizations: she has had COVID vaccine  Interval History:     · Anxiety and Depression: she continues to follow with counseling; she reports that she is limited to about once per month. She reports that the wellbutrin is helping. She reports that she is no longer taking Zoloft \"not feeling like I needed it\". She denies any SI.   · Prediabetes: last A1c 5.8 in Sept, 2020  · Obesity: she has been working to eat more protein and less carbs  · HLD: she had cut back on her meat intake  · LEONIE on CPAP: she reports that this has been okay, but she has not seen sleep medicine in a few years. · Vitamin D deficiency: takes 1000 international units  Daily  · Migranes: she reports that she has not needed imitrex recently; She reports fewer than 5 migraines in the last year  · Right Hand Pain: she reports that it has good days and bad days. · Allergies: she reports as improved with masking  · Elevated ALT: noted on labs in Sept  · Right Knee Pain: she reports that the knee cap feels like it is popping around, she sometimes has trouble standing and going up stairs. She reports that she is concerned about patellar tracking. She states that it has been going on for about two months and has been worse over the last month.    · Low Back Pain: worse below left shoulder blade and across her back. She states that she would like to discuss having breast reduction with a surgeon. She will send us the name of a doctor who she is comfortable with. · Mouth Sores: she reports that this occurs occasionally. She reports that it is mostly on inside of her mouth. She will take a picture the next time that this occurs. Review of Systems  All other ROS were reviewed and are negative except as discussed in HPI        Allergies: Patient has no known allergies. Medications: (Updated to reflect final medication list after visit)    Current Outpatient Medications:     buPROPion XL (WELLBUTRIN XL) 150 mg tablet, Take 1 Tablet by mouth every morning., Disp: 90 Tablet, Rfl: 0    diclofenac (VOLTAREN) 1 % gel, Apply 2 g to affected area two (2) times daily as needed for Pain., Disp: 100 g, Rfl: 2    LO LOESTRIN FE 1 mg-10 mcg (24)/10 mcg (2) tab, TAKE 1 TABLET BY ORAL ROUTE ONCE DAILY, Disp: , Rfl: 4    cholecalciferol (VITAMIN D3) 1,000 unit tablet, Take  by mouth daily. , Disp: , Rfl:     cpap machine kit, by Does Not Apply route., Disp: , Rfl:     SUMAtriptan (IMITREX) 50 mg tablet, Take 1 Tab by mouth once as needed for Migraine (may repeat in 2 hours after initial dose) for up to 1 dose., Disp: 10 Tab, Rfl: 0    fluticasone (FLONASE) 50 mcg/actuation nasal spray, nightly., Disp: , Rfl:     multivitamin (ONE A DAY) tablet, Take 1 Tab by mouth daily. , Disp: , Rfl:     fexofenadine (ALLEGRA) 180 mg tablet, Take 180 mg by mouth daily. , Disp: , Rfl:     History  Patient Care Team:  Lynsey Harris MD as PCP - General (Family Medicine)  Lynsey Harris MD [facility]  Matthieu Hill MD (Endocrinology)  Sowmya Shi MD as Physician (Obstetrics & Gynecology)    Past Medical History: she has a past medical history of Allergic rhinitis, Infertility, LEONIE on CPAP (4/4/2018), Reactive depression (situational), Situational stress, and Vitamin D deficiency (2/2015). Past Surgical History: she has a past surgical history that includes hx orthopaedic (2010). Family Medical History: family history includes Asthma in her brother and father; Heart Disease (age of onset: 76) in her father; Delkhadijah Avilezr (age of onset: 40) in her brother; Obesity in her mother. Social History: she reports that she has never smoked. She has never used smokeless tobacco. She reports current alcohol use. She reports that she does not use drugs. Health Maintenance Due   Topic Date Due    Hepatitis C Screening  Never done    Colorectal Cancer Screening Combo  Never done       Objective:   Visit Vitals  /72 (BP 1 Location: Left upper arm, BP Patient Position: Sitting, BP Cuff Size: Adult)   Pulse 100   Temp 98.2 °F (36.8 °C) (Oral)   Resp 16   Ht 5' 0.5\" (1.537 m)   Wt 202 lb 12.8 oz (92 kg)   LMP 07/04/2021   SpO2 98%   BMI 38.95 kg/m²     Wt Readings from Last 3 Encounters:   07/23/21 202 lb (91.6 kg)   07/20/21 202 lb 12.8 oz (92 kg)   03/02/20 212 lb 3.2 oz (96.3 kg)       Physical Exam  Vitals reviewed. Constitutional:       General: She is not in acute distress. Appearance: Normal appearance. She is normal weight. She is not ill-appearing or toxic-appearing. HENT:      Head: Normocephalic and atraumatic. Right Ear: Hearing, tympanic membrane, ear canal and external ear normal. No drainage. No middle ear effusion. There is no impacted cerumen. Tympanic membrane is not injected or erythematous. Left Ear: Hearing, tympanic membrane, ear canal and external ear normal. No drainage. No middle ear effusion. There is no impacted cerumen. Tympanic membrane is not injected or erythematous. Nose: Nose normal. No nasal deformity or septal deviation. Mouth/Throat:      Lips: Pink. No lesions. Mouth: Mucous membranes are moist.      Palate: No mass. Pharynx: Oropharynx is clear. Uvula midline.  No pharyngeal swelling, oropharyngeal exudate or posterior oropharyngeal erythema. Tonsils: No tonsillar exudate. Eyes:      General: Lids are normal. Vision grossly intact. Gaze aligned appropriately. Right eye: No discharge. Left eye: No discharge. Extraocular Movements: Extraocular movements intact. Conjunctiva/sclera: Conjunctivae normal.      Right eye: Right conjunctiva is not injected. Left eye: Left conjunctiva is not injected. Pupils: Pupils are equal, round, and reactive to light. Neck:      Vascular: No carotid bruit. Cardiovascular:      Rate and Rhythm: Normal rate and regular rhythm. Pulses: Normal pulses. Heart sounds: Normal heart sounds. No murmur heard. No friction rub. No gallop. Pulmonary:      Effort: Pulmonary effort is normal. No respiratory distress. Breath sounds: Normal breath sounds. No stridor. No wheezing, rhonchi or rales. Abdominal:      General: Bowel sounds are normal. There is no distension or abdominal bruit. Tenderness: There is no abdominal tenderness. There is no guarding. Musculoskeletal:         General: Normal range of motion. Cervical back: Normal range of motion and neck supple. No rigidity. No muscular tenderness. Lymphadenopathy:      Cervical: No cervical adenopathy. Skin:     General: Skin is warm. Coloration: Skin is not jaundiced. Neurological:      General: No focal deficit present. Mental Status: She is alert. Motor: Motor function is intact. Coordination: Coordination is intact. Gait: Gait is intact. Deep Tendon Reflexes:      Reflex Scores:       Patellar reflexes are 2+ on the right side and 2+ on the left side. Psychiatric:         Attention and Perception: Attention and perception normal.         Mood and Affect: Mood and affect normal.         Speech: Speech normal.         Cognition and Memory: Cognition and memory normal.         Assessment & Plan:      ICD-10-CM ICD-9-CM    1.  Annual physical exam  Z00.00 V70.0    2. Anxiety and depression  F41.9 300.00 TSH 3RD GENERATION    F32.9 311 TSH 3RD GENERATION   3. Severe obesity (BMI 35.0-35.9 with comorbidity) ([facility])  E66.01 278.01     Z68.35 V85.35    4. Prediabetes  R73.03 790.29 HEMOGLOBIN A1C WITH EAG      HEMOGLOBIN A1C WITH EAG   5. Mixed hyperlipidemia  E78.2 272.2 LIPID PANEL      METABOLIC PANEL, COMPREHENSIVE      LIPID PANEL      METABOLIC PANEL, COMPREHENSIVE   6. LEONIE on CPAP  G47.33 327.23 SLEEP MEDICINE REFERRAL    Z99.89 V46.8    7. Vitamin D deficiency  E55.9 268.9 VITAMIN D, 25 HYDROXY      VITAMIN D, 25 HYDROXY   8. Migraine without status migrainosus, not intractable, unspecified migraine type  G43.909 346.90    9. Post-traumatic osteoarthritis of right hand  M19.141 715.24 REFERRAL TO PHYSICAL THERAPY   10. Elevated ALT measurement  R74.01 790.4    11. Environmental and seasonal allergies  J30.89 477.8    12. Chronic pain of right knee  M25.561 719.46 XR KNEE RT MIN 4 V    G89.29 338.29 REFERRAL TO PHYSICAL THERAPY   13. Chronic bilateral thoracic back pain  M54.6 724.1     G89.29 338.29    14. Mouth sores  K13.79 528.9    15. Screening for deficiency anemia  Z13.0 V78.1 CBC W/O DIFF      CBC W/O DIFF      Annual Physical Exam: Reviewed and addressed patient's medical history and concerns as noted above. Reviewed recommended screenings and immunizations. Discussed recommendations for diet, exercise, and lifestyle. Obtain labs as above. I have discussed the diagnosis with the patient and the intended plan as seen in the above orders. The patient has received an after-visit summary along with patient information handout. I have discussed medication side effects and warnings with the patient as well. Disposition  Follow-up and Dispositions    · Return in about 1 week (around 7/27/2021) for knee pain.            Fredi Dumont MD

## 2021-07-20 NOTE — PROGRESS NOTES
Chief Complaint   Patient presents with    Complete Physical     not fasting     1. Have you been to the ER, urgent care clinic since your last visit? Hospitalized since your last visit? No    2. Have you seen or consulted any other health care providers outside of the [address] [place] [address] since your last visit? Include any pap smears or colon screening.  No     [facility]

## 2021-07-21 LAB
25(OH)D3+25(OH)D2 SERPL-MCNC: 41.9 NG/ML (ref 30–100)
ALBUMIN SERPL-MCNC: 4.4 G/DL (ref 3.8–4.8)
ALBUMIN/GLOB SERPL: 1.7 {RATIO} (ref 1.2–2.2)
ALP SERPL-CCNC: 71 IU/L (ref 48–121)
ALT SERPL-CCNC: 38 IU/L (ref 0–32)
AST SERPL-CCNC: 28 IU/L (ref 0–40)
BILIRUB SERPL-MCNC: 0.3 MG/DL (ref 0–1.2)
BUN SERPL-MCNC: 11 MG/DL (ref 6–24)
BUN/CREAT SERPL: 15 (ref 9–23)
CALCIUM SERPL-MCNC: 9.9 MG/DL (ref 8.7–10.2)
CHLORIDE SERPL-SCNC: 103 MMOL/L (ref 96–106)
CHOLEST SERPL-MCNC: 280 MG/DL (ref 100–199)
CO2 SERPL-SCNC: 21 MMOL/L (ref 20–29)
CREAT SERPL-MCNC: 0.74 MG/DL (ref 0.57–1)
ERYTHROCYTE [DISTWIDTH] IN BLOOD BY AUTOMATED COUNT: 13 % (ref 11.7–15.4)
EST. AVERAGE GLUCOSE BLD GHB EST-MCNC: 117 MG/DL
GLOBULIN SER CALC-MCNC: 2.6 G/DL (ref 1.5–4.5)
GLUCOSE SERPL-MCNC: 81 MG/DL (ref 65–99)
HBA1C MFR BLD: 5.7 % (ref 4.8–5.6)
HCT VFR BLD AUTO: 42.6 % (ref 34–46.6)
HDLC SERPL-MCNC: 42 MG/DL
HGB BLD-MCNC: 14 G/DL (ref 11.1–15.9)
IMP & REVIEW OF LAB RESULTS: NORMAL
LDLC SERPL CALC-MCNC: 165 MG/DL (ref 0–99)
MCH RBC QN AUTO: 30.4 PG (ref 26.6–33)
MCHC RBC AUTO-ENTMCNC: 32.9 G/DL (ref 31.5–35.7)
MCV RBC AUTO: 93 FL (ref 79–97)
PLATELET # BLD AUTO: 331 X10E3/UL (ref 150–450)
POTASSIUM SERPL-SCNC: 4.7 MMOL/L (ref 3.5–5.2)
PROT SERPL-MCNC: 7 G/DL (ref 6–8.5)
RBC # BLD AUTO: 4.6 X10E6/UL (ref 3.77–5.28)
SODIUM SERPL-SCNC: 141 MMOL/L (ref 134–144)
TRIGL SERPL-MCNC: 381 MG/DL (ref 0–149)
TSH SERPL DL<=0.005 MIU/L-ACNC: 3.75 UIU/ML (ref 0.45–4.5)
VLDLC SERPL CALC-MCNC: 73 MG/DL (ref 5–40)
WBC # BLD AUTO: 6.9 X10E3/UL (ref 3.4–10.8)

## 2021-07-23 ENCOUNTER — TELEPHONE (OUTPATIENT)
Dept: SLEEP MEDICINE | Age: 46
End: 2021-07-23

## 2021-07-23 ENCOUNTER — VIRTUAL VISIT (OUTPATIENT)
Dept: SLEEP MEDICINE | Age: 46
End: 2021-07-23
Payer: COMMERCIAL

## 2021-07-23 VITALS
WEIGHT: 202 LBS | SYSTOLIC BLOOD PRESSURE: 124 MMHG | TEMPERATURE: 98.2 F | HEIGHT: 61 IN | BODY MASS INDEX: 38.14 KG/M2 | DIASTOLIC BLOOD PRESSURE: 72 MMHG

## 2021-07-23 DIAGNOSIS — F41.9 ANXIETY AND DEPRESSION: ICD-10-CM

## 2021-07-23 DIAGNOSIS — G47.33 OSA ON CPAP: Primary | ICD-10-CM

## 2021-07-23 DIAGNOSIS — Z11.52 ENCOUNTER FOR SCREENING FOR COVID-19: ICD-10-CM

## 2021-07-23 DIAGNOSIS — Z99.89 OSA ON CPAP: Primary | ICD-10-CM

## 2021-07-23 DIAGNOSIS — F32.A ANXIETY AND DEPRESSION: ICD-10-CM

## 2021-07-23 PROCEDURE — 99203 OFFICE O/P NEW LOW 30 MIN: CPT | Performed by: INTERNAL MEDICINE

## 2021-07-23 NOTE — PATIENT INSTRUCTIONS
[address] [place]. [place], [address]  Tel.  [phone]  Fax. [address] [facility] [address]  [place] [address], [address] [facility]  Tel.  [phone]  Fax. [phone] [address] [employer] Daryl Bryant  Tel.  [phone]  Fax. [phone]     Learning About CPAP for Sleep Apnea  What is CPAP? CPAP is a small machine that you use at home every night while you sleep. It increases air pressure in your throat to keep your airway open. When you have sleep apnea, this can help you sleep better so you feel much better. CPAP stands for \"continuous positive airway pressure. \"  The CPAP machine will have one of the following:  · A mask that covers your nose and mouth  · Prongs that fit into your nose  · A mask that covers your nose only, the most common type. This type is called NCPAP. The N stands for \"nasal.\"  Why is it done? CPAP is usually the best treatment for obstructive sleep apnea. It is the first treatment choice and the most widely used. Your doctor may suggest CPAP if you have:  · Moderate to severe sleep apnea. · Sleep apnea and coronary artery disease (CAD) or heart failure. How does it help? · CPAP can help you have more normal sleep, so you feel less sleepy and more alert during the daytime. · CPAP may help keep heart failure or other heart problems from getting worse. · NCPAP may help lower your blood pressure. · If you use CPAP, your bed partner may also sleep better because you are not snoring or restless. What are the side effects? Some people who use CPAP have:  · A dry or stuffy nose and a sore throat. · Irritated skin on the face. · Sore eyes. · Bloating. If you have any of these problems, work with your doctor to fix them. Here are some things you can try:  · Be sure the mask or nasal prongs fit well. · See if your doctor can adjust the pressure of your CPAP. · If your nose is dry, try a humidifier.   · If your nose is runny or stuffy, try decongestant medicine or a steroid nasal spray. If these things do not help, you might try a different type of machine. Some machines have air pressure that adjusts on its own. Others have air pressures that are different when you breathe in than when you breathe out. This may reduce discomfort caused by too much pressure in your nose. Where can you learn more? Go to Neurosearch.be  Enter Tejal Zuri in the search box to learn more about \"Learning About CPAP for Sleep Apnea. \"   © 2155-6573 Healthwise, Incorporated. Care instructions adapted under license by [employer] (which disclaims liability or warranty for this information). This care instruction is for use with your licensed healthcare professional. If you have questions about a medical condition or this instruction, always ask your healthcare professional. [place] [address] any warranty or liability for your use of this information. Content Version: 2.6.95476; Last Revised: January 11, 2010  PROPER SLEEP HYGIENE    What to avoid  · Do not have drinks with caffeine, such as coffee or black tea, for 8 hours before bed. · Do not smoke or use other types of tobacco near bedtime. Nicotine is a stimulant and can keep you awake. · Avoid drinking alcohol late in the evening, because it can cause you to wake in the middle of the night. · Do not eat a big meal close to bedtime. If you are hungry, eat a light snack. · Do not drink a lot of water close to bedtime, because the need to urinate may wake you up during the night. · Do not read or watch TV in bed. Use the bed only for sleeping and sexual activity. What to try  · Go to bed at the same time every night, and wake up at the same time every morning. Do not take naps during the day. · Keep your bedroom quiet, dark, and cool. · Get regular exercise, but not within 3 to 4 hours of your bedtime. .  · Sleep on a comfortable pillow and mattress.   · If watching the clock makes you anxious, turn it facing away from you so you cannot see the time. · If you worry when you lie down, start a worry book. Well before bedtime, write down your worries, and then set the book and your concerns aside. · Try meditation or other relaxation techniques before you go to bed. · If you cannot fall asleep, get up and go to another room until you feel sleepy. Do something relaxing. Repeat your bedtime routine before you go to bed again. · Make your house quiet and calm about an hour before bedtime. Turn down the lights, turn off the TV, log off the computer, and turn down the volume on music. This can help you relax after a busy day. Drowsy Driving: The [employer] cites drowsiness as a causing factor in more than 061,189 police reported crashes annually, resulting in 76,000 injuries and 1,500 deaths. Other surveys suggest 55% of people polled have driven while drowsy in the past year, 23% had fallen asleep but not crashed, 3% crashed, and 2% had and accident due to drowsy driving. Who is at risk? Young Drivers: One study of drowsy driving accidents states that 55% of the drivers were under 25 years. Of those, 75% were male. Shift Workers and Travelers: People who work overnight or travel across time zones frequently are at higher risk of experiencing Circadian Rhythm Disorders. They are trying to work and function when their body is programed to sleep. Sleep Deprived: Lack of sleep has a serious impact on your ability to pay attention or focus on a task. Consistently getting less than the average of 8 hours your body needs creates partial or cumulative sleep deprivation. Untreated Sleep Disorders: Sleep Apnea, Narcolepsy, R.L.S., and other sleep disorders (untreated) prevent a person from getting enough restful sleep. This leads to excessive daytime sleepiness and increases the risk for drowsy driving accidents by up to 7 times.   Medications / Alcohol: Even over the counter medications can cause drowsiness. Medications that impair a drivers attention should have a warning label. Alcohol naturally makes you sleepy and on its own can cause accidents. Combined with excessive drowsiness its effects are amplified. Signs of Drowsy Driving:   * You don't remember driving the last few miles   * You may drift out of your maranda   * You are unable to focus and your thoughts wander   * You may yawn more often than normal   * You have difficulty keeping your eyes open / nodding off   * Missing traffic signs, speeding, or tailgating  Prevention-   Good sleep hygiene, lifestyle and behavioral choices have the most impact on drowsy driving. There is no substitute for sleep and the average person requires 8 hours nightly. If you find yourself driving drowsy, stop and sleep. Consider the sleep hygiene tips provided during your visit as well. Medication Refill Policy: Refills for all medications require 1 week advance notice. Please have your pharmacy fax a refill request. We are unable to fax, or call in \"controled substance\" medications and you will need to pick these prescriptions up from our office. The Box Populi Activation    Thank you for requesting access to [employer]. Please follow the instructions below to securely access and download your online medical record. The [employer]uli allows you to send messages to your doctor, view your test results, renew your prescriptions, schedule appointments, and more. How Do I Sign Up? 1. In your internet browser, go to [email or web address]. 2. Click on the First Time User? Click Here link in the Sign In box. You will see the New Member Sign Up page. 3. Enter your The Box [employer]uli Access Code exactly as it appears below. You will not need to use this code after youve completed the sign-up process. If you do not sign up before the expiration date, you must request a new code. The [employer]uli Access Code:  Activation code not generated  Current RebelMail Status: Active (This is the date your RebelMail access code will )    4. Enter the last four digits of your Social Security Number (xxxx) and Date of Birth (mm/dd/yyyy) as indicated and click Submit. You will be taken to the next sign-up page. 5. Create a Informed Tradest ID. This will be your Informed Tradest login ID and cannot be changed, so think of one that is secure and easy to remember. 6. Create a RebelMail password. You can change your password at any time. 7. Enter your Password Reset Question and Answer. This can be used at a later time if you forget your password. 8. Enter your e-mail address. You will receive e-mail notification when new information is available in [address] 9. Click Sign Up. You can now view and download portions of your medical record. 10. Click the Download Summary menu link to download a portable copy of your medical information. Additional Information    If you have questions, please call [phone]. Remember, RebelMail is NOT to be used for urgent needs. For medical emergencies, dial 911.

## 2021-07-23 NOTE — PROGRESS NOTES
[address] [facility] [place]. [place], [address]  Tel.  [phone]  Fax. [address] [facility]  [place], [address] [facility]  Tel.  [phone]  Fax. [phone] [address] [place] [facility] Daryl Bryant   Tel.  [phone]  Fax. [phone]       Rosa Chin is a 40y.o. year old female referred by Dr. Mary Lu for evaluation of a sleep disorder. ASSESSMENT/PLAN:      ICD-10-CM ICD-9-CM    1. LEONIE on CPAP  G47.33 327.23 SLEEP LAB (PAP TITRATION)    Z99.89 V46.8    2. Anxiety and depression  F41.9 300.00     F32.9 311    3. BMI 38.0-38.9,adult  Z68.38 V85.38    4. Encounter for screening for COVID-19  Z11.52 V73.89 NOVEL CORONAVIRUS (COVID-19)      NOVEL CORONAVIRUS (COVID-19)       Patient has a history and examination consistent with the diagnosis of sleep apnea. Obtain prior records for previous sleep doctor. If unable to do so, she will need to do an HSAT. Scan in PAP card download for 30 days. * Sleep testing was ordered for evaluation of Bi-Level therapy due to CPAP Failure (nocturnal awakenings and non-restorative sleep on CPAP therapy). Orders Placed This Encounter    NOVEL CORONAVIRUS (COVID-19)     Standing Status:   Future     Number of Occurrences:   1     Standing Expiration Date:   10/23/2021     Scheduling Instructions:      1) Due to current limited availability of the COVID-19 PCR test, tests will be prioritized and may not be completed.              2) Order only if the test result will change clinical management or necessary for a return to mission-critical employment decision.              3) Print and instruct patient to adhere to CDC home isolation program. (Link Above)              4) Set up or refer patient for a monitoring program.              5) Have patient sign up for and leverage MyChart (if not previously done).      Order Specific Question:   Status     Answer:   Asymptomatic/Surveillance(e.g. pre-op/pre-procedure/pre-delivery/transfer)     Order Specific Question:   Reason for Test     Answer:   Upcoming elective surgery/procedure/delivery, return to work, or discharge to another facility    SLEEP LAB (PAP TITRATION)     Standing Status:   Future     Standing Expiration Date:   10/23/2021     Scheduling Instructions:      Perform Bi-level Titration. Order Specific Question:   Reason for Exam     Answer:   LEONIE       * She was provided information on sleep apnea including corresponding risk factors and the importance of proper treatment. * Treatment options were reviewed in detail. she would like to proceed with PAP therapy. Patient will be seen in follow-up in 6-8 weeks after PAP setup to gauge treatment response and adherence to therapy. * The patient was counseled regarding proper sleep hygiene, with emphasis on ensuring sufficient total sleep time; safe driving and the benefits of exercise and weight loss. * All of her questions were addressed. 2. Recommended a dedicated weight loss program through appropriate diet and exercise regimen as significant weight reduction has been shown to reduce severity of obstructive sleep apnea. SUBJECTIVE/OBJECTIVE:    Jill Ruiz is an 40 y.o. female referred for evaluation for a sleep disorder. She complains of snoring associated with snorting, periods of not breathing, sleep talking and nightmares. Symptoms began 6 years ago, unchanged since that time. She was diagnosed with LEONIE and is on current CPAP therapy (ResMed AirSense 10 AutoSet). She usually can fall asleep in 5-10 minutes. Family or house members do not note snoring on PAP therapy. She reports of waking up during the night on current therapy and does not feel refreshed on waking, feels tired during the day. She denies of symptoms indicative of cataplexy, sleep paralysis or sleep related hallucinations.     She denies of a history of unusual movements occurring during sleep.    Emy Bailey does wake up frequently at night. She is not bothered by waking up too early and left unable to get back to sleep. She actually sleeps about 6 hours at night and wakes up about 3 times during the night. She does not work shifts: Bruno Vossakhil Bond indicates she does get too little sleep at night. Her bedtime is 2300. She awakens at 0630. She does take naps. She takes 1 naps a week lasting 1, 30 to 45, Hour(s). She has the following observed behaviors: Loud snoring, Light snoring, Pauses in breathing, Sleep talking; Nightmares. Other remarks: waking with a gasp or snort    The patient has undergone diagnostic testing for the current problems. Review of Systems:  Constitutional:  No significant weight loss or weight gain  Eyes:  No blurred vision  CVS:  No significant chest pain  Pulm:  No significant shortness of breath  GI:  No significant nausea or vomiting  :  No significant nocturia  Musculoskeletal:  No significant joint pain at night  Skin:  No significant rashes  Neuro:  No significant dizziness   Psych:  No active mood issues    Sleep Review of Systems: notable for Negative difficulty falling asleep; Positive awakenings at night; Positive perceived regular dreaming; Negative nightmares; Negative  early morning headaches; Negative  memory problems; Negative  concentration issues; Negative caffeine;  Negative alcohol;   Negative history of any automobile or occupational accidents due to daytime drowsiness. [place] Sleepiness Score: 5   and Modified F.O.S.Q. Score Total / 2: 17.5    Patient-Reported Vitals 7/23/2021   Patient-Reported Weight 202 lb   Patient-Reported Height -   Patient-Reported Temperature 98.2   Patient-Reported Systolic  765   Patient-Reported Diastolic 72   Patient-Reported LMP -       Physical Exam completed by visual and auditory observation of patient with verbal input from patient.     General:   Alert, oriented, not in acute distress   Eyes:  Anicteric Sclerae; no obvious strabismus   Nose:  No obvious nasal septum deviation    Neck:   Midline trachea, no visible mass   Chest/Lungs:  Respiratory effort normal, no visualized signs of difficulty breathing or respiratory distress   CVS:  No JVD   Extremities:  No obvious rashes noted on face, neck, or hands   Neuro:  No facial asymmetry, no focal deficits; no obvious tremor    Psych:  Normal affect,  normal countenance     Edgar Velez is being evaluated by a Virtual Visit (video visit) encounter to address concerns as mentioned above. A caregiver was present when appropriate. Due to this being a TeleHealth encounter (During IYG-36 public health emergency), evaluation of the following organ systems was limited: Vitals/Constitutional/EENT/Resp/CV/GI//MS/Neuro/Skin/Heme-Lymph-Imm. Pursuant to the emergency declaration under the [address] [place] [address], [address] [place] [address] authority and the [employer] Act, this Virtual Visit was conducted with patient's (and/or legal guardian's) consent, to reduce the patient's risk of exposure to COVID-19 and provide necessary medical care. Patient identification was verified at the start of the visit: YES using name and date of birth. Patient's phone number [phone] (mobile) was confirmed for accuracy. She gives permission for messages regarding results and appointments to be left at that number. Services were provided through a video synchronous discussion virtually to substitute for in-person clinic visit. I was at home while conducting this encounter, patient located at their home or alternate location of their choice. An electronic signature was used to authenticate this note. Lisa Quiles MD, FAASM  Diplomate American Board of Sleep Medicine  Diplomate in Sleep Medicine - ABP    Electronically signed.  07/23/21

## 2021-07-26 PROBLEM — G89.29 CHRONIC PAIN OF RIGHT KNEE: Status: ACTIVE | Noted: 2021-07-26

## 2021-07-26 PROBLEM — G43.909 MIGRAINE WITHOUT STATUS MIGRAINOSUS, NOT INTRACTABLE: Status: ACTIVE | Noted: 2021-07-26

## 2021-07-26 PROBLEM — G89.29 CHRONIC BILATERAL THORACIC BACK PAIN: Status: ACTIVE | Noted: 2021-07-26

## 2021-07-26 PROBLEM — M54.6 CHRONIC BILATERAL THORACIC BACK PAIN: Status: ACTIVE | Noted: 2021-07-26

## 2021-07-26 PROBLEM — M25.561 CHRONIC PAIN OF RIGHT KNEE: Status: ACTIVE | Noted: 2021-07-26

## 2021-07-26 PROBLEM — K13.79 MOUTH SORES: Status: ACTIVE | Noted: 2021-07-26

## 2021-08-16 ENCOUNTER — HOSPITAL ENCOUNTER (OUTPATIENT)
Dept: PHYSICAL THERAPY | Age: 46
Discharge: HOME | End: 2021-08-16
Attending: FAMILY MEDICINE
Payer: COMMERCIAL

## 2021-08-16 DIAGNOSIS — M19.141 POST-TRAUMATIC OSTEOARTHRITIS OF RIGHT HAND: ICD-10-CM

## 2021-08-16 DIAGNOSIS — G89.29 CHRONIC PAIN OF RIGHT KNEE: ICD-10-CM

## 2021-08-16 DIAGNOSIS — M25.561 CHRONIC PAIN OF RIGHT KNEE: ICD-10-CM

## 2021-08-16 PROCEDURE — 97161 PT EVAL LOW COMPLEX 20 MIN: CPT | Performed by: PHYSICAL THERAPIST

## 2021-08-16 PROCEDURE — 97110 THERAPEUTIC EXERCISES: CPT | Performed by: PHYSICAL THERAPIST

## 2021-08-17 ENCOUNTER — OFFICE VISIT (OUTPATIENT)
Dept: FAMILY MEDICINE CLINIC | Age: 46
End: 2021-08-17
Payer: COMMERCIAL

## 2021-08-17 VITALS
TEMPERATURE: 98.5 F | DIASTOLIC BLOOD PRESSURE: 72 MMHG | HEART RATE: 96 BPM | RESPIRATION RATE: 16 BRPM | SYSTOLIC BLOOD PRESSURE: 118 MMHG | OXYGEN SATURATION: 97 % | BODY MASS INDEX: 37.95 KG/M2 | HEIGHT: 61 IN | WEIGHT: 201 LBS

## 2021-08-17 DIAGNOSIS — F41.9 ANXIETY AND DEPRESSION: ICD-10-CM

## 2021-08-17 DIAGNOSIS — J30.89 ENVIRONMENTAL AND SEASONAL ALLERGIES: ICD-10-CM

## 2021-08-17 DIAGNOSIS — E78.2 MIXED HYPERLIPIDEMIA: ICD-10-CM

## 2021-08-17 DIAGNOSIS — G89.29 CHRONIC BILATERAL THORACIC BACK PAIN: ICD-10-CM

## 2021-08-17 DIAGNOSIS — M19.141 POST-TRAUMATIC OSTEOARTHRITIS OF RIGHT HAND: ICD-10-CM

## 2021-08-17 DIAGNOSIS — M54.6 CHRONIC BILATERAL THORACIC BACK PAIN: ICD-10-CM

## 2021-08-17 DIAGNOSIS — R73.03 PREDIABETES: ICD-10-CM

## 2021-08-17 DIAGNOSIS — K13.79 MOUTH SORES: ICD-10-CM

## 2021-08-17 DIAGNOSIS — E55.9 VITAMIN D DEFICIENCY: ICD-10-CM

## 2021-08-17 DIAGNOSIS — M25.561 CHRONIC PAIN OF RIGHT KNEE: ICD-10-CM

## 2021-08-17 DIAGNOSIS — F32.A ANXIETY AND DEPRESSION: ICD-10-CM

## 2021-08-17 DIAGNOSIS — M79.641 RIGHT HAND PAIN: ICD-10-CM

## 2021-08-17 DIAGNOSIS — G43.909 MIGRAINE WITHOUT STATUS MIGRAINOSUS, NOT INTRACTABLE, UNSPECIFIED MIGRAINE TYPE: ICD-10-CM

## 2021-08-17 DIAGNOSIS — G89.29 CHRONIC PAIN OF RIGHT KNEE: ICD-10-CM

## 2021-08-17 DIAGNOSIS — E66.01 SEVERE OBESITY (BMI 35.0-35.9 WITH COMORBIDITY) (HCC): ICD-10-CM

## 2021-08-17 DIAGNOSIS — R21 RASH: Primary | ICD-10-CM

## 2021-08-17 PROCEDURE — 99213 OFFICE O/P EST LOW 20 MIN: CPT | Performed by: FAMILY MEDICINE

## 2021-08-17 PROCEDURE — 97110 THERAPEUTIC EXERCISES: CPT | Performed by: PHYSICAL THERAPIST

## 2021-08-17 PROCEDURE — 97161 PT EVAL LOW COMPLEX 20 MIN: CPT | Performed by: PHYSICAL THERAPIST

## 2021-08-17 RX ORDER — PHENTERMINE HYDROCHLORIDE 37.5 MG/1
CAPSULE ORAL
COMMUNITY
Start: 2020-09-04 | End: 2021-11-19

## 2021-08-17 RX ORDER — NORETHINDRONE ACETATE AND ETHINYL ESTRADIOL 1MG-20(24)
KIT ORAL
COMMUNITY
Start: 2021-08-06 | End: 2022-05-02

## 2021-08-17 RX ORDER — NYSTATIN 100000 U/G
CREAM TOPICAL 2 TIMES DAILY
Qty: 15 G | Refills: 0 | Status: SHIPPED | OUTPATIENT
Start: 2021-08-17

## 2021-08-17 NOTE — PROGRESS NOTES
PT INITIAL EVALUATION NOTE - [facility] 2-15    Patient Name: Amira Ventura  Date: 21   : 1975  [x]  Patient  Verified  Payor: Shayla Husain / Plan: Melida Bush Se HMO / Product Type: HMO /    In time:420 PM  Out time:455 PM  Total Treatment Time (min): 35  Total Timed Codes (min): 15  1:1 Treatment Time ([address] only): 15   Visit #: 1     Treatment Area: Post-traumatic osteoarthritis of right hand [M19.141]  Chronic pain of right knee [M25.561, G89.29]    SUBJECTIVE  Pain Level (0-10 scale): 0 current 5 worst   Any medication changes, allergies to medications, adverse drug reactions, diagnosis change, or new procedure performed?: [] No    [x] Yes (see summary sheet for update)  Subjective:    Patient referred to PT with a chief complaint of a crunchy R knee. States that it started a couple of months ago of insidious onset. She saw her PCP 2 weeks ago had x-rays which were normal and was referred to PT. She currently ambulates 1 mile per day. States that she has pain sometimes during her walks, especially if the dog pulls on the leash.    Pain Location: anterior R knee  Pain Description: ache, annoying, crunch   Aggravating Factors: sit to stand transfers, ascending stairs  Relieving factors: elevation  Current Functional Limitations: difficulty walking for exercise, avoiding cycling   PLOF: able to walk for exercise and ascend stairs without pain   Mechanism of Injury: none  Previous Treatment/Compliance: none  PMHx/Surgical Hx: OA, depression, L ankle ORIF   Work Hx: [address] [employer], involves prolonged sitting and standing walking   Living Situation: lives with   Pt Goals: \"strengthen the Carlita"   Barriers: none  Motivation: good           OBJECTIVE/EXAMINATION  Observation: B pes planus    Squat: weight shift to L LE, painful, knee flexion limited to 50 degrees    Gait: normal     Edema: none     ROM:     Hip PROM: WNL    Knee AROM:   R knee flexion 130 degrees  R knee extension 0 degrees     L knee flexion 130 degrees  L knee extension 0 degrees    Knee PROM:   R knee extension 5 degrees hyperextension    L knee extension 5 degrees hyperextension     Strength:     R Hip flexion 4+/5  R Hip abduction 4+/5  R Knee extension 5/5  R Knee flexion 5/5    L Hip flexion 4+/5  L Hip abduction 4+/5  L Knee extension 5/5  L Knee flexion 5/5      Palpation: tender to palpation R fat pad, R medial joint line     Joint mobility: normal patellar glides B         15 min Therapeutic Exercise:  [x] See flow sheet : Taught SLR, sidelying clam, bridge with squeeze and SLS    Rationale: increase ROM, increase strength, improve coordination, improve balance and increase proprioception to improve the patients ability to ambulate, exercise               With   [x] TE   [] TA   [] neuro   [] other: Patient Education: [x] Review HEP    [] Progressed/Changed HEP based on:   [] positioning   [] body mechanics   [] transfers   [] heat/ice application    [x] other: role of PT, expected course of PT      Other Objective/Functional Measures:NT    Pain Level (0-10 scale) post treatment: 0     ASSESSMENT/Changes in Function:     [x]  See Plan of [address], PT 8/17/2021  6:58 AM

## 2021-08-17 NOTE — PROGRESS NOTES
[facility]  [address]  [place], [address] [facility]  Phone: [phone]  Fax: [phone]    Plan of Care/Statement of Necessity for Physical Therapy Services  2-15    Patient name: Linda Kovacs  : 1975  Provider#: [id number]  Referral source: Gladis Deleon MD      Medical/Treatment Diagnosis: Post-traumatic osteoarthritis of right hand [M19.141]  Chronic pain of right knee [M25.561, G89.29]     Prior Hospitalization: see medical history     Comorbidities: OA, depression, L ankle ORIF   Prior Level of Function: able to walk for exercise and ascend stairs without pain   Medications: Verified on Patient Summary List    Start of Care: 21      Onset Date: 2021       The [address] [place] [address] and following information is based on the information from the initial evaluation. Assessment/ key information: Patient presents with R knee pain with decreased R LE strength limiting ability to perform functional activities such as exercising. She would benefit from skilled PT to increase R LE strength to decrease R knee pain so she can return to prior level of function.      Evaluation Complexity History MEDIUM  Complexity : 1-2 comorbidities / personal factors will impact the outcome/ POC ; Examination LOW Complexity : 1-2 Standardized tests and measures addressing body structure, function, activity limitation and / or participation in recreation  ;Presentation LOW Complexity : Stable, uncomplicated  ;Clinical Decision Making LOW Complexity : FOTO score of   Overall Complexity Rating: LOW     Problem List: pain affecting function, decrease strength, impaired gait/ balance, decrease ADL/ functional abilitiies and decrease activity tolerance   Treatment Plan may include any combination of the following: Therapeutic exercise, Therapeutic activities, Neuromuscular re-education, Physical agent/modality, Gait/balance training, Manual therapy and Patient education  Patient / Family readiness to learn indicated by: asking questions, trying to perform skills and interest  Persons(s) to be included in education: patient (P)  Barriers to Learning/Limitations: None  Patient Goal (s): strengthen the muscles  Patient Self Reported Health Status: good  Rehabilitation Potential: good    Short Term Goals: To be accomplished in 2 weeks:    1. Patient will be I in HEP to promote self management of symptoms. 2. Patient will report pain level at worst as less than or equal to 3/10 so they can perform ADLs without pain. Long Term Goals: To be accomplished in 4-6 weeks:    1. Patient will report pain level at worst as less than or equal to 1/10 so they can perform ADLs without pain. 2. Patient will be able to ambulate > or = 1 mile with her dog without limitation from R knee pain. 3. Patient will be able to complete 30 minutes of cycling without limitation from R knee pain. Frequency / Duration: Patient to be seen 2 times per week for 4 weeks. Patient/ Caregiver education and instruction: exercises    [x]  Plan of care has been reviewed with LARRY Porter, PT 8/17/2021 7:56 AM    ________________________________________________________________________    I certify that the above Therapy Services are being furnished while the patient is under my care. I agree with the treatment plan and certify that this therapy is necessary.     [de-identified] Signature:____________________  Date:____________Time: _________

## 2021-08-17 NOTE — PROGRESS NOTES
Chief Complaint   Patient presents with    Knee Pain     Right knee,  Began physical therapy on 8/16/2021.       1. Have you been to the ER, urgent care clinic since your last visit? Hospitalized since your last visit? No    2. Have you seen or consulted any other health care providers outside of the [address] [place] [address] since your last visit? Include any pap smears or colon screening.  No

## 2021-08-17 NOTE — PROGRESS NOTES
Natasha Diamond  40 y.o. female  1975  [address] [facility] [address]  [id number]     Health system PRACTICE       Encounter Date: 8/17/2021           Established Patient Visit Note: Tashia Fotrune MD    Reason for Appointment:  Chief Complaint   Patient presents with    Knee Pain     Right knee,  Began physical therapy on 8/16/2021. History of Present Illness:  History provided by patient    Natasha Diamond is a 40 y.o. female who presents to clinic today for:    · Knee Pain: had xray that did not reveal any acute abnormality; started PT yesterday  · Prediabetes: Last A1c 5.7   · Low Back Pain: working with PT  · Elevated ALT: last ALT went from 54 to 38  · She recently started Jerson with her insurance. She is down 4 lbs since starting. · Mixed Hyperlipidemia: Last lipids on 7/20/21: Chol 280, , HDL 42 and ASCVD 1.9%  · She will having overngiht sleep study in Sept  · Anxiety and Depression: continues with wellbutrin and counseling; she has been exercising more  · Obesity: she has been working to eat more protein and less carbs  · Vitamin D deficiency: takes 1000 international units  Daily; last Vit D 41.9  · Migranes: she reports as stable  · Right Hand Pain: she reports that it has good days and bad days. · Allergies: she reports as improved with masking  · Mouth Sores: she reports as stable. · Rash: she reports having a rash under her left abdomen           Review of Systems  Review of Systems   Constitutional: Negative for chills and fever. Respiratory: Negative for cough, shortness of breath and wheezing. Cardiovascular: Negative for chest pain and palpitations. Allergies: Patient has no known allergies.     Medications: (Updated to reflect final medication list after visit)    Current Outpatient Medications:     Blisovi 24 Fe 1 mg-20 mcg (24)/75 mg (4) tab, , Disp: , Rfl:     nystatin (MYCOSTATIN) topical cream, Apply  to affected area two (2) times a day., Disp: 15 g, Rfl: 0    buPROPion XL (WELLBUTRIN XL) 150 mg tablet, Take 1 Tablet by mouth every morning., Disp: 90 Tablet, Rfl: 0    diclofenac (VOLTAREN) 1 % gel, Apply 2 g to affected area two (2) times daily as needed for Pain., Disp: 100 g, Rfl: 2    cholecalciferol (VITAMIN D3) 1,000 unit tablet, Take  by mouth daily. , Disp: , Rfl:     cpap machine kit, by Does Not Apply route., Disp: , Rfl:     SUMAtriptan (IMITREX) 50 mg tablet, Take 1 Tab by mouth once as needed for Migraine (may repeat in 2 hours after initial dose) for up to 1 dose., Disp: 10 Tab, Rfl: 0    fluticasone (FLONASE) 50 mcg/actuation nasal spray, nightly., Disp: , Rfl:     multivitamin (ONE A DAY) tablet, Take 1 Tab by mouth daily. , Disp: , Rfl:     fexofenadine (ALLEGRA) 180 mg tablet, Take 180 mg by mouth daily. , Disp: , Rfl:     phentermine 37.5 mg capsule, , Disp: , Rfl:     LO LOESTRIN FE 1 mg-10 mcg (24)/10 mcg (2) tab, TAKE 1 TABLET BY ORAL ROUTE ONCE DAILY (Patient not taking: Reported on 8/17/2021), Disp: , Rfl: 4    History  Patient Care Team:  Polo Neville MD as PCP - General (Family Medicine)  Polo Neville MD as PCP - [facility]  Lillie Novak MD (Endocrinology)  Sabina Zuñiga MD as Physician (Obstetrics & Gynecology)    Past Medical History: she has a past medical history of Allergic rhinitis, Infertility, LEONIE on CPAP (4/4/2018), Reactive depression (situational), Situational stress, and Vitamin D deficiency (2/2015). Past Surgical History: she has a past surgical history that includes hx orthopaedic (2010). Family Medical History: family history includes Asthma in her brother and father; Heart Disease (age of onset: 76) in her father; Vitaly Mofelicias (age of onset: 40) in her brother; Obesity in her mother. Social History: she reports that she has never smoked. She has never used smokeless tobacco. She reports current alcohol use.  She reports that she does not use drugs. Health Maintenance Due   Topic Date Due    Hepatitis C Screening  Never done    Colorectal Cancer Screening Combo  Never done       Objective:   Visit Vitals  /72 (BP 1 Location: Right arm, BP Patient Position: Sitting, BP Cuff Size: Adult long)   Pulse 96   Temp 98.5 °F (36.9 °C) (Temporal)   Resp 16   Ht 5' 1\" (1.549 m)   Wt 201 lb (91.2 kg)   LMP 08/14/2021   SpO2 97%   BMI 37.98 kg/m²     Wt Readings from Last 3 Encounters:   08/17/21 201 lb (91.2 kg)   07/23/21 202 lb (91.6 kg)   07/20/21 202 lb 12.8 oz (92 kg)       Physical Exam  Vitals and nursing note reviewed. Constitutional:       General: She is not in acute distress. Appearance: Normal appearance. HENT:      Head: Normocephalic and atraumatic. Nose: Nose normal.      Mouth/Throat:      Mouth: Mucous membranes are moist.   Eyes:      Extraocular Movements: Extraocular movements intact. Conjunctiva/sclera: Conjunctivae normal.      Pupils: Pupils are equal, round, and reactive to light. Cardiovascular:      Rate and Rhythm: Normal rate and regular rhythm. Pulses: Normal pulses. Heart sounds: Normal heart sounds. No murmur heard. No friction rub. No gallop. Pulmonary:      Effort: Pulmonary effort is normal. No respiratory distress. Breath sounds: Normal breath sounds. No wheezing, rhonchi or rales. Musculoskeletal:         General: Normal range of motion. Cervical back: Normal range of motion and neck supple. No rigidity. No muscular tenderness. Lymphadenopathy:      Cervical: No cervical adenopathy. Skin:     General: Skin is warm. Coloration: Skin is not jaundiced. Comments: Chaperon Present, left lower abdomen with area of redness and erythema in the skin fold   Neurological:      General: No focal deficit present. Mental Status: She is alert. Mental status is at baseline. Cranial Nerves: Cranial nerves are intact.       Motor: Motor function is intact. Coordination: Coordination is intact. Psychiatric:         Mood and Affect: Mood normal.         Behavior: Behavior normal.         Thought Content: Thought content normal.         Judgment: Judgment normal.             Assessment & Plan:      ICD-10-CM ICD-9-CM    1. Rash  R21 782.1 nystatin (MYCOSTATIN) topical cream   2. Anxiety and depression  F41.9 300.00     F32.9 311    3. Severe obesity (BMI 35.0-35.9 with comorbidity) ([facility])  E66.01 278.01     Z68.35 V85.35    4. Prediabetes  R73.03 790.29    5. Mixed hyperlipidemia  E78.2 272.2    6. Vitamin D deficiency  E55.9 268.9    7. Migraine without status migrainosus, not intractable, unspecified migraine type  G43.909 346.90    8. Post-traumatic osteoarthritis of right hand  M19.141 715.24    9. Environmental and seasonal allergies  J30.89 477.8    10. Chronic pain of right knee  M25.561 719.46     G89.29 338.29    11. Chronic bilateral thoracic back pain  M54.6 724.1     G89.29 338.29    12. Mouth sores  K13.79 528.9    13. Right hand pain  M79.641 729.5      · Rash: New problem, uncontrolled. Treat with nystatin as above. · All conditions listed above: chronic and stable. Will continue current treatment regimen. Discussed following up with specialist as scheduled. Discussed recommendations for diet and exercise. I have discussed the diagnosis with the patient and the intended plan as seen in the above orders. The patient has received an after-visit summary along with patient information handout. I have discussed medication side effects and warnings with the patient as well. Disposition  Follow-up and Dispositions    · Return in about 3 months (around 11/17/2021), or if symptoms worsen or fail to improve, for obtain labs one week prior to visit.            Mara Cornejo MD

## 2021-08-18 ENCOUNTER — HOSPITAL ENCOUNTER (OUTPATIENT)
Dept: PHYSICAL THERAPY | Age: 46
Discharge: HOME | End: 2021-08-18
Attending: FAMILY MEDICINE
Payer: COMMERCIAL

## 2021-08-18 PROCEDURE — 97110 THERAPEUTIC EXERCISES: CPT | Performed by: PHYSICAL THERAPIST

## 2021-08-18 NOTE — PROGRESS NOTES
PT DAILY TREATMENT NOTE - [facility] 2-15    Patient Name: Inderjit Maya  Date:2021  : 1975  [x]  Patient  Verified  Payor: Deana Palacios / Plan: [employer] HMO / Product Type: HMO /    In time:250 PM  Out time:330 PM  Total Treatment Time (min): 40  Total Timed Codes (min): 40   Visit #:  2    Treatment Area: Right hand pain [M79.641]  Right knee pain [M25.561]    SUBJECTIVE  Pain Level (0-10 scale): 1  Any medication changes, allergies to medications, adverse drug reactions, diagnosis change, or new procedure performed?: [x] No    [] Yes (see summary sheet for update)  Subjective functional status/changes:   [] No changes reported  Patient reports compliance with HEP    OBJECTIVE    40 min Therapeutic Exercise:  [x] See flow sheet : Progressed per flow sheet, brief manual rectus stretch off table    Rationale: increase ROM, increase strength and improve coordination to improve the patients ability to ambulate                 With   [] TE   [] TA   [] neuro   [] other: Patient Education: [x] Review HEP    [] Progressed/Changed HEP based on:   [] positioning   [] body mechanics   [] transfers   [] heat/ice application    [] other:      Other Objective/Functional Measures: NT     Pain Level (0-10 scale) post treatment: 1.5    ASSESSMENT/Changes in Function:   Patient tolerated treatment well today. Patient will continue to benefit from skilled PT services to modify and progress therapeutic interventions, address functional mobility deficits, address ROM deficits, address strength deficits, analyze and address soft tissue restrictions, analyze and cue movement patterns and assess and modify postural abnormalities to attain remaining goals. Progress towards goals / Updated goals:  Short Term Goals: To be accomplished in 2 weeks:               1. Patient will be I in HEP to promote self management of symptoms.  PROGRESSING              2. Patient will report pain level at worst as less than or equal to 3/10 so they can perform ADLs without pain. Long Term Goals: To be accomplished in 4-6 weeks:               1.  Patient will report pain level at worst as less than or equal to 1/10 so they can perform ADLs without pain. 2. Patient will be able to ambulate > or = 1 mile with her dog without limitation from R knee pain. 3. Patient will be able to complete 30 minutes of cycling without limitation from R knee pain.      PLAN  [x]  Upgrade activities as tolerated     [x]  Continue plan of care  []  Update interventions per flow sheet       []  Discharge due to:_  []  Other:_      Tyree Fong, PT 8/18/2021

## 2021-08-23 ENCOUNTER — HOSPITAL ENCOUNTER (OUTPATIENT)
Dept: PHYSICAL THERAPY | Age: 46
Discharge: HOME | End: 2021-08-23
Attending: FAMILY MEDICINE
Payer: COMMERCIAL

## 2021-08-23 PROCEDURE — 97016 VASOPNEUMATIC DEVICE THERAPY: CPT | Performed by: PHYSICAL THERAPY ASSISTANT

## 2021-08-23 PROCEDURE — 97016 VASOPNEUMATIC DEVICE THERAPY: CPT

## 2021-08-23 PROCEDURE — 97110 THERAPEUTIC EXERCISES: CPT | Performed by: PHYSICAL THERAPY ASSISTANT

## 2021-08-23 PROCEDURE — 97110 THERAPEUTIC EXERCISES: CPT

## 2021-08-23 NOTE — PROGRESS NOTES
PT DAILY TREATMENT NOTE - [facility] 2-15    Patient Name: Jackie Madrid  Date:2021  : 1975  [x]  Patient  Verified  Payor: Rudy Valero / Plan: [employer] HMO / Product Type: HMO /    In time: 9:20 AM  Out time: 10:10 AM  Total Treatment Time (min): 50  Total Timed Codes (min): 40  Visit #:  3    Treatment Area: Knee pain, right [M25.561]    SUBJECTIVE  Pain Level (0-10 scale): 1.5  Any medication changes, allergies to medications, adverse drug reactions, diagnosis change, or new procedure performed?: [x] No    [] Yes (see summary sheet for update)  Subjective functional status/changes:   [] No changes reported  Patient reports hitting her knee on the coffee table Saturday and was a little sore. Patient states that ice relieves her pain and has been utilizing her HEP. Patient has increased pain with stairs or a bending motion and is relieved with ice. OBJECTIVE    Modalities  10 min Vasopneumatic compression: 34 degrees Mod. Compression to decrease pain and inflammation. 40 min Therapeutic Exercise:  [x] See flow sheet : Progressed per flow sheet, brief manual rectus stretch off table    Rationale: increase ROM, increase strength and improve coordination to improve the patients ability to ambulate                 With   [] TE   [] TA   [] neuro   [] other: Patient Education: [x] Review HEP    [] Progressed/Changed HEP based on:   [] positioning   [] body mechanics   [] transfers   [] heat/ice application    [] other:      Other Objective/Functional Measures: NT     Pain Level (0-10 scale) post treatment: .5/10    ASSESSMENT/Changes in Function:   Patient completed exercises without adverse effects.    Patient will continue to benefit from skilled PT services to modify and progress therapeutic interventions, address functional mobility deficits, address ROM deficits, address strength deficits, analyze and address soft tissue restrictions, analyze and cue movement patterns and assess and modify postural abnormalities to attain remaining goals. Progress towards goals / Updated goals:  Short Term Goals: To be accomplished in 2 weeks:               1. Patient will be I in HEP to promote self management of symptoms. PROGRESSING              2. Patient will report pain level at worst as less than or equal to 3/10 so they can perform ADLs without pain. Long Term Goals: To be accomplished in 4-6 weeks:               1.  Patient will report pain level at worst as less than or equal to 1/10 so they can perform ADLs without pain. 2. Patient will be able to ambulate > or = 1 mile with her dog without limitation from R knee pain. 3. Patient will be able to complete 30 minutes of cycling without limitation from R knee pain.      PLAN  [x]  Upgrade activities as tolerated     [x]  Continue plan of care  []  Update interventions per flow sheet       []  Discharge due to:_  []  Other:_      [facility] 8/23/2021

## 2021-08-26 ENCOUNTER — HOSPITAL ENCOUNTER (OUTPATIENT)
Dept: PHYSICAL THERAPY | Age: 46
Discharge: HOME | End: 2021-08-26
Attending: FAMILY MEDICINE
Payer: COMMERCIAL

## 2021-08-26 PROCEDURE — 97016 VASOPNEUMATIC DEVICE THERAPY: CPT | Performed by: PHYSICAL THERAPIST

## 2021-08-26 PROCEDURE — 97110 THERAPEUTIC EXERCISES: CPT | Performed by: PHYSICAL THERAPIST

## 2021-08-26 NOTE — PROGRESS NOTES
PT DAILY TREATMENT NOTE - [facility] 2-15    Patient Name: Linda   Date:2021  : 1975  [x]  Patient  Verified  Payor: Jim Howell / Plan: [employer] HMO / Product Type: HMO /    In time: 9179 AM  Out time: 1115 AM  Total Treatment Time (min): 60  Total Timed Codes (min): 50  Visit #:  4    Treatment Area: Knee pain, right [M25.561]    SUBJECTIVE  Pain Level (0-10 scale): 0  Any medication changes, allergies to medications, adverse drug reactions, diagnosis change, or new procedure performed?: [x] No    [] Yes (see summary sheet for update)  Subjective functional status/changes:   [] No changes reported  Patient reports that she squatted down this morning and had pain in her R knee when standing up from the low squat. OBJECTIVE    Modalities  10 min Vasopneumatic compression: 34 degrees Mod. Compression to decrease pain and inflammation. 50 min Therapeutic Exercise:  [x] See flow sheet : Progressed per flow sheet, brief manual rectus stretch off table    Rationale: increase ROM, increase strength and improve coordination to improve the patients ability to ambulate                 With   [] TE   [] TA   [] neuro   [] other: Patient Education: [x] Review HEP    [] Progressed/Changed HEP based on:   [] positioning   [] body mechanics   [] transfers   [] heat/ice application    [] other:      Other Objective/Functional Measures: NT     Pain Level (0-10 scale) post treatment: 0     ASSESSMENT/Changes in Function:   Patient tolerated exercise progression well today. Patient will continue to benefit from skilled PT services to modify and progress therapeutic interventions, address functional mobility deficits, address ROM deficits, address strength deficits, analyze and address soft tissue restrictions, analyze and cue movement patterns and assess and modify postural abnormalities to attain remaining goals. Progress towards goals / Updated goals:  Short Term Goals:  To be accomplished in 2 weeks:               1. Patient will be I in HEP to promote self management of symptoms. PROGRESSING              2. Patient will report pain level at worst as less than or equal to 3/10 so they can perform ADLs without pain. Long Term Goals: To be accomplished in 4-6 weeks:               1.  Patient will report pain level at worst as less than or equal to 1/10 so they can perform ADLs without pain. 2. Patient will be able to ambulate > or = 1 mile with her dog without limitation from R knee pain. 3. Patient will be able to complete 30 minutes of cycling without limitation from R knee pain.      PLAN  [x]  Upgrade activities as tolerated     [x]  Continue plan of care  []  Update interventions per flow sheet       []  Discharge due to:_  []  Other:_      Rui Marlow, PT 8/26/2021

## 2021-08-30 ENCOUNTER — HOSPITAL ENCOUNTER (OUTPATIENT)
Dept: PHYSICAL THERAPY | Age: 46
Discharge: HOME | End: 2021-08-30
Attending: FAMILY MEDICINE
Payer: COMMERCIAL

## 2021-08-30 PROCEDURE — 97110 THERAPEUTIC EXERCISES: CPT | Performed by: PHYSICAL THERAPIST

## 2021-08-30 NOTE — PROGRESS NOTES
PT DAILY TREATMENT NOTE - [facility] 2-15    Patient Name: Ralf Grande  Date:2021  : 1975  [x]  Patient  Verified  Payor: Nabeel Cardoso / Plan: [employer] HMO / Product Type: HMO /    In time: 56 AM  Out time:1020 AM  Total Treatment Time (min): 45  Total Timed Codes (min): 45  Visit #:  5    Treatment Area: Knee pain, right [M25.561]    SUBJECTIVE  Pain Level (0-10 scale): 1.5  Any medication changes, allergies to medications, adverse drug reactions, diagnosis change, or new procedure performed?: [x] No    [] Yes (see summary sheet for update)  Subjective functional status/changes:   [] No changes reported  Patient reports that her knee pain is hovering around a 1.5. She has been avoiding squatting. OBJECTIVE    Modalities  0 min Vasopneumatic compression: 34 degrees Mod. Compression to decrease pain and inflammation. 45 min Therapeutic Exercise:  [x] See flow sheet : Progressed per flow sheet, brief manual rectus stretch off table    Rationale: increase ROM, increase strength and improve coordination to improve the patients ability to ambulate                 With   [] TE   [] TA   [] neuro   [] other: Patient Education: [x] Review HEP    [] Progressed/Changed HEP based on:   [] positioning   [] body mechanics   [] transfers   [] heat/ice application    [] other:      Other Objective/Functional Measures: NT     Pain Level (0-10 scale) post treatment: 1.5    ASSESSMENT/Changes in Function:   Held ice secondary to patient time constraint today. Patient tolerated exercises well today. Patient will continue to benefit from skilled PT services to modify and progress therapeutic interventions, address functional mobility deficits, address ROM deficits, address strength deficits, analyze and address soft tissue restrictions, analyze and cue movement patterns and assess and modify postural abnormalities to attain remaining goals.        Progress towards goals / Updated goals:  Short Term Goals: To be accomplished in 2 weeks:               1. Patient will be I in HEP to promote self management of symptoms. PROGRESSING              2. Patient will report pain level at worst as less than or equal to 3/10 so they can perform ADLs without pain. Long Term Goals: To be accomplished in 4-6 weeks:               1.  Patient will report pain level at worst as less than or equal to 1/10 so they can perform ADLs without pain. 2. Patient will be able to ambulate > or = 1 mile with her dog without limitation from R knee pain. 3. Patient will be able to complete 30 minutes of cycling without limitation from R knee pain.      PLAN  [x]  Upgrade activities as tolerated     [x]  Continue plan of care  []  Update interventions per flow sheet       []  Discharge due to:_  []  Other:_      Janie Pike, PT 8/30/2021

## 2021-09-01 ENCOUNTER — DOCUMENTATION ONLY (OUTPATIENT)
Dept: SLEEP MEDICINE | Age: 46
End: 2021-09-01

## 2021-09-01 ENCOUNTER — HOSPITAL ENCOUNTER (OUTPATIENT)
Dept: SLEEP MEDICINE | Age: 46
Discharge: HOME | End: 2021-09-01
Payer: COMMERCIAL

## 2021-09-01 ENCOUNTER — TELEPHONE (OUTPATIENT)
Dept: SLEEP MEDICINE | Age: 46
End: 2021-09-01

## 2021-09-01 VITALS
OXYGEN SATURATION: 95 % | BODY MASS INDEX: 37.19 KG/M2 | SYSTOLIC BLOOD PRESSURE: 139 MMHG | DIASTOLIC BLOOD PRESSURE: 89 MMHG | HEART RATE: 95 BPM | TEMPERATURE: 97.3 F | HEIGHT: 61 IN | WEIGHT: 197 LBS

## 2021-09-01 DIAGNOSIS — Z99.89 OSA ON CPAP: ICD-10-CM

## 2021-09-01 DIAGNOSIS — G47.33 OSA ON CPAP: ICD-10-CM

## 2021-09-01 PROCEDURE — 95811 POLYSOM 6/>YRS CPAP 4/> PARM: CPT | Performed by: INTERNAL MEDICINE

## 2021-09-02 NOTE — PROGRESS NOTES
[address] [facility]. [place], [address]  Tel.  [phone]  Fax. [address] [facility] [address]  [place], [address] [facility]  Tel.  [phone]  Fax. [phone] [address] [facility] Daryl Bryant  Tel.  [phone]  Fax. [phone]     Sleep Study Technical Notes        PRE-Test:  Lena Brooks (: 1975) arrived in the lobby. Patient was greeted, temperature checked (*97.3) and screening questions asked. The patient was taken to the Sleep Center and taken directly to his/her room. BP (139/89) and SaO2 (95%) were taken. Weight per patient (197 lbs). Procedure explained to the patient and questions were answered. The patient expressed understanding of the procedure. Electrodes were applied without incident. The patient was placed in bed and the study was started.  PAP mask acclimation for 5 min. Pj Huynh Notes:   Lights off: 9:18 pm   Respiratory events: Hypopneas, sleep onset central apneas    ECG:  Normal sinus rhythm   Snoring:  Mild/moderate to loud    PAP titration:BIPAP 8.0CM/4. 0CM TO 15. 0CM/11.0CM   Desensitization Mask(s) Used: RESMED AIR FIT F30I - MEDIUM AND RESMED AIR TOUCH F20 - MEDIUM   Other comments: NO  o Patient had caregiver in attendance:  NO  o Patient watched TV or on phone after lights out:  NO  o Patient slept with positional therapy:  NO  o Patient slept with head of bed elevated:  NO  o Patient wore an oral appliance:  NO  o Patient to bathroom 1 time  o Pediatric Patient:  NO  - Parent accompanied patient:  NA  - Parent slept in bed with patient:  NA      POST Test:   Patient was awakened. Electrodes were removed. The patient was discharged after answering the Post Questionnaire. Patient stated that he/she was alert and ok to drive.  Equipment and room cleaned per infection control policy.

## 2021-09-03 ENCOUNTER — HOSPITAL ENCOUNTER (OUTPATIENT)
Dept: PHYSICAL THERAPY | Age: 46
Discharge: HOME | End: 2021-09-03
Attending: FAMILY MEDICINE
Payer: COMMERCIAL

## 2021-09-03 PROCEDURE — 97016 VASOPNEUMATIC DEVICE THERAPY: CPT

## 2021-09-03 PROCEDURE — 97016 VASOPNEUMATIC DEVICE THERAPY: CPT | Performed by: PHYSICAL THERAPIST

## 2021-09-03 PROCEDURE — 97110 THERAPEUTIC EXERCISES: CPT | Performed by: PHYSICAL THERAPIST

## 2021-09-03 PROCEDURE — 97110 THERAPEUTIC EXERCISES: CPT

## 2021-09-03 NOTE — PROGRESS NOTES
PT DAILY TREATMENT NOTE - [facility] 2-15    Patient Name: Candida Fields  Date:9/3/2021  : 1975  [x]  Patient  Verified  Payor: Merced Ohms / Plan: [address] HMO / Product Type: HMO /    In time: 10:25 AM  Out time:11:20 AM  Total Treatment Time (min): 55  Total Timed Codes (min): 45  Visit #:  6    Treatment Area: Knee pain, right [M25.561]    SUBJECTIVE  Pain Level (0-10 scale): 0/10  Any medication changes, allergies to medications, adverse drug reactions, diagnosis change, or new procedure performed?: [x] No    [] Yes (see summary sheet for update)  Subjective functional status/changes:   [] No changes reported  Patient reports that her knee has been feeling better. She is still avoiding squatting. Patient reports not wanting to stress the knee and wants it to calm down. Patient states applying ice after work for pain relief. Patient reports not completing HEP due to busy week. OBJECTIVE    Modalities  10 min Vasopneumatic compression: 34 degrees Mod. Compression to decrease pain and inflammation. 45 min Therapeutic Exercise:  [x] See flow sheet : Progressed per flow sheet, brief manual rectus stretch off table    Rationale: increase ROM, increase strength and improve coordination to improve the patients ability to ambulate                 With   [] TE   [] TA   [] neuro   [] other: Patient Education: [x] Review HEP    [] Progressed/Changed HEP based on:   [] positioning   [] body mechanics   [] transfers   [] heat/ice application    [] other:      Other Objective/Functional Measures: NT     Pain Level (0-10 scale) post treatment: 0/10    ASSESSMENT/Changes in Function:   Patient completed progressed exercises without adverse effects. Cue for locked knees during wobble board.    Patient will continue to benefit from skilled PT services to modify and progress therapeutic interventions, address functional mobility deficits, address ROM deficits, address strength deficits, analyze and address soft tissue restrictions, analyze and cue movement patterns and assess and modify postural abnormalities to attain remaining goals. Progress towards goals / Updated goals:  Short Term Goals: To be accomplished in 2 weeks:               1. Patient will be I in HEP to promote self management of symptoms. PROGRESSING              2. Patient will report pain level at worst as less than or equal to 3/10 so they can perform ADLs without pain. Long Term Goals: To be accomplished in 4-6 weeks:               1.  Patient will report pain level at worst as less than or equal to 1/10 so they can perform ADLs without pain. 2. Patient will be able to ambulate > or = 1 mile with her dog without limitation from R knee pain. 3. Patient will be able to complete 30 minutes of cycling without limitation from R knee pain.      PLAN  [x]  Upgrade activities as tolerated     [x]  Continue plan of care  []  Update interventions per flow sheet       []  Discharge due to:_  []  Other:_      MARY Thacker 9/3/2021

## 2021-09-07 ENCOUNTER — HOSPITAL ENCOUNTER (OUTPATIENT)
Dept: PHYSICAL THERAPY | Age: 46
Discharge: HOME | End: 2021-09-07
Attending: FAMILY MEDICINE
Payer: COMMERCIAL

## 2021-09-07 PROCEDURE — 97110 THERAPEUTIC EXERCISES: CPT | Performed by: PHYSICAL THERAPIST

## 2021-09-07 NOTE — PROGRESS NOTES
PT DAILY TREATMENT NOTE - [facility] 2-15    Patient Name: Christi Bhakta  Date:2021  : 1975  [x]  Patient  Verified  Payor: Dorcas Life / Plan: [address] HMO / Product Type: HMO /    In time: 12 AM  Out time:1150 AM  Total Treatment Time (min): 45  Total Timed Codes (min): 45  Visit #:  7    Treatment Area: Knee pain, right [M25.561]    SUBJECTIVE  Pain Level (0-10 scale): 0/10  Any medication changes, allergies to medications, adverse drug reactions, diagnosis change, or new procedure performed?: [x] No    [] Yes (see summary sheet for update)  Subjective functional status/changes:   [] No changes reported  Patient reports that she doesn't have any knee pain today. States that she had a little bit over the weekend, but overall the knee is improving. OBJECTIVE    Modalities  0 min Vasopneumatic compression: 34 degrees Mod. Compression to decrease pain and inflammation. 45 min Therapeutic Exercise:  [x] See flow sheet : Progressed per flow sheet, brief manual rectus stretch off table    Rationale: increase ROM, increase strength and improve coordination to improve the patients ability to ambulate                 With   [] TE   [] TA   [] neuro   [] other: Patient Education: [x] Review HEP    [] Progressed/Changed HEP based on:   [] positioning   [] body mechanics   [] transfers   [] heat/ice application    [] other:      Other Objective/Functional Measures: NT     Pain Level (0-10 scale) post treatment: 0/10    ASSESSMENT/Changes in Function:   Patient tolerated exercise progression well today. Patient will continue to benefit from skilled PT services to modify and progress therapeutic interventions, address functional mobility deficits, address ROM deficits, address strength deficits, analyze and address soft tissue restrictions, analyze and cue movement patterns and assess and modify postural abnormalities to attain remaining goals.        Progress towards goals / Updated goals:  Short Term Goals: To be accomplished in 2 weeks:               1. Patient will be I in HEP to promote self management of symptoms. PROGRESSING              2. Patient will report pain level at worst as less than or equal to 3/10 so they can perform ADLs without pain. Long Term Goals: To be accomplished in 4-6 weeks:               1.  Patient will report pain level at worst as less than or equal to 1/10 so they can perform ADLs without pain. 2. Patient will be able to ambulate > or = 1 mile with her dog without limitation from R knee pain. 3. Patient will be able to complete 30 minutes of cycling without limitation from R knee pain.      PLAN  [x]  Upgrade activities as tolerated     [x]  Continue plan of care  []  Update interventions per flow sheet       []  Discharge due to:_  []  Other:_      Rachel Porter, PT 9/7/2021

## 2021-09-10 ENCOUNTER — HOSPITAL ENCOUNTER (OUTPATIENT)
Dept: PHYSICAL THERAPY | Age: 46
Discharge: HOME | End: 2021-09-10
Attending: FAMILY MEDICINE
Payer: COMMERCIAL

## 2021-09-10 PROCEDURE — 97110 THERAPEUTIC EXERCISES: CPT | Performed by: PHYSICAL THERAPIST

## 2021-09-10 PROCEDURE — 97016 VASOPNEUMATIC DEVICE THERAPY: CPT | Performed by: PHYSICAL THERAPIST

## 2021-09-10 NOTE — PROGRESS NOTES
PT DAILY TREATMENT NOTE/PROGRESS NOTE - [facility] 2-15    Patient Name: Natasha Diamond  Date:9/10/2021  : 1975  [x]  Patient  Verified  Payor: Isak Spicer / Plan: [address] HMO / Product Type: HMO /    In time: 56 AM  Out time:1030  AM  Total Treatment Time (min): 55  Total Timed Codes (min): 45  Visit #:  8    Treatment Area: Knee pain, right [M25.561]    SUBJECTIVE  Pain Level (0-10 scale): 0/10 current 1.5 worst   Any medication changes, allergies to medications, adverse drug reactions, diagnosis change, or new procedure performed?: [x] No    [] Yes (see summary sheet for update)  Subjective functional status/changes:   [] No changes reported  Patient reports that she walked 20,000 steps yesterday and her knee felt good. States that she still is limited in squatting and kneeling. OBJECTIVE  Observation: B pes planus               Squat: 50 degrees equal weight distribution     Gait: normal      Edema: none      ROM:      Hip PROM: WNL     Knee AROM:   R knee flexion 130 degrees  R knee extension 0 degrees      L knee flexion 130 degrees  L knee extension 0 degrees     Knee PROM:   R knee extension 5 degrees hyperextension     L knee extension 5 degrees hyperextension      Strength:     R Hip flexion 5/5  R Hip abduction 4+/5  R Knee extension 5/5  R Knee flexion 5/5     L Hip flexion 4+/5  L Hip abduction 4+/5  L Knee extension 5/5  L Knee flexion 5/5          Joint mobility: normal patellar glides B      Modalities  10 min Vasopneumatic compression: 34 degrees Mod. Compression to decrease pain and inflammation.      45 min Therapeutic Exercise:  [x] See flow sheet : Progressed per flow sheet, brief manual rectus stretch off table    Rationale: increase ROM, increase strength and improve coordination to improve the patients ability to ambulate            With   [] TE   [] TA   [] neuro   [] other: Patient Education: [x] Review HEP    [] Progressed/Changed HEP based on:   [] positioning   [] body mechanics   [] transfers   [] heat/ice application    [] other:      Other Objective/Functional Measures: NT     Pain Level (0-10 scale) post treatment: 0/10    ASSESSMENT/Changes in Function:   Patient has been seen x 8 visits. She is progressing with increased LE strength and decreased pain. She would benefit from continued treatment to progress strengthening and balance exercises to further decrease pain so she can return to prior level of function. Patient will continue to benefit from skilled PT services to modify and progress therapeutic interventions, address functional mobility deficits, address ROM deficits, address strength deficits, analyze and address soft tissue restrictions, analyze and cue movement patterns and assess and modify postural abnormalities to attain remaining goals. Progress towards goals / Updated goals:  Short Term Goals: To be accomplished in 2 weeks:               1. Patient will be I in HEP to promote self management of symptoms. PROGRESSING              2. Patient will report pain level at worst as less than or equal to 3/10 so they can perform ADLs without pain. MET  Long Term Goals: To be accomplished in 4-6 weeks:               1.  Patient will report pain level at worst as less than or equal to 1/10 so they can perform ADLs without pain. PROGRESSING              2. Patient will be able to ambulate > or = 1 mile with her dog without limitation from R knee pain. MET              3. Patient will be able to complete 30 minutes of cycling without limitation from R knee pain.  PROGRESSING    PLAN  [x]  Upgrade activities as tolerated     [x]  Continue plan of care 1x per week x 4 weeks from 9/10/21   []  Update interventions per flow sheet       []  Discharge due to:_  []  Other:_      Dipak Caraballo, PT 9/10/2021

## 2021-09-13 ENCOUNTER — PATIENT MESSAGE (OUTPATIENT)
Dept: FAMILY MEDICINE CLINIC | Age: 46
End: 2021-09-13

## 2021-09-13 DIAGNOSIS — R21 RASH: Primary | ICD-10-CM

## 2021-09-13 DIAGNOSIS — F32.A ANXIETY AND DEPRESSION: ICD-10-CM

## 2021-09-13 DIAGNOSIS — F41.9 ANXIETY AND DEPRESSION: ICD-10-CM

## 2021-09-13 RX ORDER — FLUCONAZOLE 150 MG/1
150 TABLET ORAL
Qty: 4 TABLET | Refills: 0 | Status: SHIPPED | OUTPATIENT
Start: 2021-09-13 | End: 2021-10-05

## 2021-09-13 RX ORDER — BUPROPION HYDROCHLORIDE 150 MG/1
150 TABLET ORAL
Qty: 90 TABLET | Refills: 1 | Status: SHIPPED | OUTPATIENT
Start: 2021-09-13 | End: 2022-03-04 | Stop reason: SDUPTHER

## 2021-09-13 NOTE — TELEPHONE ENCOUNTER
Last Visit: 8/17/21 MD Seun Nuñez  Next Appointment: 11/19/21 MD Seun Nuñez  Previous Refill Encounter(s): 6/16/21 90    Requested Prescriptions     Pending Prescriptions Disp Refills    buPROPion XL (WELLBUTRIN XL) 150 mg tablet 90 Tablet 1     Sig: Take 1 Tablet by mouth every morning.

## 2021-09-13 NOTE — TELEPHONE ENCOUNTER
ICD-10-CM ICD-9-CM    1.  Rash  R21 782.1 fluconazole (DIFLUCAN) 150 mg tablet      REFERRAL TO DERMATOLOGY

## 2021-09-13 NOTE — TELEPHONE ENCOUNTER
Linsey Mak, LATONIA 0/93/9263 86:91 PM EDT      ----- Message -----  From: Florencia Cunha  Sent: 9/13/2021 11:41 AM EDT  To: [facility] Nurse Pool  Subject: Non-Urgent Medical Question     Hi Dr. Seun Nuñez:    in re. the abdominal rash we discussed at my last office visit, the nystatin doesn't seem to be making much difference. Rash is still around and has spread so that it's all the way across. It has also become quite itchy and sort of dry/scaly. I am wondering if it might even be some sort of eczema at this point? Any advice would be very much appreciated.      Thanks,  [place] Cornejo

## 2021-09-15 ENCOUNTER — TELEPHONE (OUTPATIENT)
Dept: SLEEP MEDICINE | Age: 46
End: 2021-09-15

## 2021-09-15 ENCOUNTER — DOCUMENTATION ONLY (OUTPATIENT)
Dept: SLEEP MEDICINE | Age: 46
End: 2021-09-15

## 2021-09-15 DIAGNOSIS — G47.33 OSA (OBSTRUCTIVE SLEEP APNEA): Primary | ICD-10-CM

## 2021-09-15 NOTE — TELEPHONE ENCOUNTER
Orders Placed This Encounter    AMB SUPPLY ORDER     Diagnosis: Sleep Apnea ICD-10 Code (G47.30); ICD-9 Code (780.57). Positive Airway Pressure Therapy: Duration of need: 99 months. ResMed VPAP Auto (VAuto Mode): Maximum IPAP: 20 cmH2O; Minimum EPAP: 09 cmH2O; PS: 4 cmH2O. Ramp Time: 30 Minutes. CPAP mask -  As fitted during titration OR patient preference, headgear, tubing, and filter;  heated humidifier; wireless modem. Remote monitoring enrollment. Delisa Yusuf MD, FAASM; NPI: [id number]  Electronically signed. 09/15/21

## 2021-09-17 ENCOUNTER — APPOINTMENT (OUTPATIENT)
Dept: PHYSICAL THERAPY | Age: 46
End: 2021-09-17
Attending: FAMILY MEDICINE
Payer: COMMERCIAL

## 2021-09-20 ENCOUNTER — HOSPITAL ENCOUNTER (OUTPATIENT)
Dept: PHYSICAL THERAPY | Age: 46
Discharge: HOME | End: 2021-09-20
Attending: FAMILY MEDICINE
Payer: COMMERCIAL

## 2021-09-20 PROCEDURE — 97110 THERAPEUTIC EXERCISES: CPT | Performed by: PHYSICAL THERAPIST

## 2021-09-20 PROCEDURE — 97016 VASOPNEUMATIC DEVICE THERAPY: CPT | Performed by: PHYSICAL THERAPIST

## 2021-09-20 NOTE — PROGRESS NOTES
PT DAILY TREATMENT NOTE - [facility] 2-15    Patient Name: Florencia Cunha  Date:2021  : 1975  [x]  Patient  Verified  Payor: Mack Knox / Plan: [address] HMO / Product Type: HMO /    In time: 36 PM  Out time: 200 PM  Total Treatment Time (min): 62  Total Timed Codes (min): 52  Visit #:  9    Treatment Area: Knee pain, right [M25.561]    SUBJECTIVE  Pain Level (0-10 scale): 0   Any medication changes, allergies to medications, adverse drug reactions, diagnosis change, or new procedure performed?: [x] No    [] Yes (see summary sheet for update)  Subjective functional status/changes:   [] No changes reported  Patient reports that kneeled down without even thinking about her knee. OBJECTIVE      Modalities  10 min Vasopneumatic compression: 34 degrees Mod. Compression to decrease pain and inflammation. 52 min Therapeutic Exercise:  [x] See flow sheet : Progressed per flow sheet, brief manual rectus stretch off table    Rationale: increase ROM, increase strength and improve coordination to improve the patients ability to ambulate            With   [] TE   [] TA   [] neuro   [] other: Patient Education: [x] Review HEP    [] Progressed/Changed HEP based on:   [] positioning   [] body mechanics   [] transfers   [] heat/ice application    [] other:      Other Objective/Functional Measures: NT     Pain Level (0-10 scale) post treatment: 0/10    ASSESSMENT/Changes in Function:   Patient tolerated treatment well today. Progressing with decreased pain. Patient will continue to benefit from skilled PT services to modify and progress therapeutic interventions, address functional mobility deficits, address ROM deficits, address strength deficits, analyze and address soft tissue restrictions, analyze and cue movement patterns and assess and modify postural abnormalities to attain remaining goals. Progress towards goals / Updated goals:  Short Term Goals:  To be accomplished in 2 weeks: 1. Patient will be I in HEP to promote self management of symptoms. PROGRESSING              2. Patient will report pain level at worst as less than or equal to 3/10 so they can perform ADLs without pain. MET  Long Term Goals: To be accomplished in 4-6 weeks:               1.  Patient will report pain level at worst as less than or equal to 1/10 so they can perform ADLs without pain. PROGRESSING              2. Patient will be able to ambulate > or = 1 mile with her dog without limitation from R knee pain. MET              3. Patient will be able to complete 30 minutes of cycling without limitation from R knee pain.  PROGRESSING    PLAN  [x]  Upgrade activities as tolerated     [x]  Continue plan of care 1x per week x 4 weeks from 9/10/21   []  Update interventions per flow sheet       []  Discharge due to:_  []  Other:_      Cassandra Ring, PT 9/20/2021

## 2021-09-29 ENCOUNTER — HOSPITAL ENCOUNTER (OUTPATIENT)
Dept: PHYSICAL THERAPY | Age: 46
Discharge: HOME | End: 2021-09-29
Attending: FAMILY MEDICINE
Payer: COMMERCIAL

## 2021-09-29 PROCEDURE — 97110 THERAPEUTIC EXERCISES: CPT | Performed by: PHYSICAL THERAPIST

## 2021-09-29 NOTE — PROGRESS NOTES
PT DAILY TREATMENT NOTE - [facility] 2-15    Patient Name: Micah Cummins  Date:2021  : 1975  [x]  Patient  Verified  Payor: Luiz Christianson / Plan: [address] HMO / Product Type: HMO /    In time: 26PM  Out time: 56 PM  Total Treatment Time (min): 55  Total Timed Codes (min): 55  Visit #:  10    Treatment Area: Knee pain, right [M25.561]    SUBJECTIVE  Pain Level (0-10 scale): 0   Any medication changes, allergies to medications, adverse drug reactions, diagnosis change, or new procedure performed?: [x] No    [] Yes (see summary sheet for update)  Subjective functional status/changes:   [] No changes reported  Patient reports that her knee is feeling good. She was able to ascend stairs without cracking and popping. OBJECTIVE     55 min Therapeutic Exercise:  [x] See flow sheet : Progressed per flow sheet, brief manual rectus stretch off table    Rationale: increase ROM, increase strength and improve coordination to improve the patients ability to ambulate            With   [] TE   [] TA   [] neuro   [] other: Patient Education: [x] Review HEP    [] Progressed/Changed HEP based on:   [] positioning   [] body mechanics   [] transfers   [] heat/ice application    [] other:      Other Objective/Functional Measures: NT     Pain Level (0-10 scale) post treatment: 0/10    ASSESSMENT/Changes in Function:   Patient has progressed with exercise program and decreased pain. She is I with HEP. Progress towards goals / Updated goals:  Short Term Goals: To be accomplished in 2 weeks:               1. Patient will be I in HEP to promote self management of symptoms. PROGRESSING              2. Patient will report pain level at worst as less than or equal to 3/10 so they can perform ADLs without pain. MET  Long Term Goals: To be accomplished in 4-6 weeks:               1.  Patient will report pain level at worst as less than or equal to 1/10 so they can perform ADLs without pain.  PROGRESSING 2. Patient will be able to ambulate > or = 1 mile with her dog without limitation from R knee pain. MET              3. Patient will be able to complete 30 minutes of cycling without limitation from R knee pain.  PROGRESSING    PLAN  []  Upgrade activities as tolerated     []  Continue plan of care 1x per week x 4 weeks from 9/10/21   []  Update interventions per flow sheet       [x]  Discharge due to:_ Progress toward goals   []  Other:_      Brittany Ochoa, PT 9/29/2021

## 2021-09-29 NOTE — PROGRESS NOTES
Physical Therapy at [facility],   a part of [address] [facility]  [address]  [place], [address]  Phone: [phone]  Fax: [phone]    Discharge Summary  2-15    Patient name: Gracie Blount  : 1975  Provider#:[id number]  Referral source: Callie Bentley MD      Medical/Treatment Diagnosis: Knee pain, right [M25.561]     Prior Hospitalization: see medical history     Comorbidities: OA, depression, L ankle ORIF   Prior Level of Function:able to walk for exercise and ascend stairs without pain  Medications: Verified on Patient Summary List    Start of Care: 21      Onset Date:2021   Visits from Start of Care: 10     Missed Visits: 0  Reporting Period : 21 to 21    Short Term Goals: To be accomplished in 2 weeks:               1. Patient will be I in HEP to promote self management of symptoms. PROGRESSING              2. Patient will report pain level at worst as less than or equal to 3/10 so they can perform ADLs without pain. MET  Long Term Goals: To be accomplished in 4-6 weeks:               7.  Patient will report pain level at worst as less than or equal to 1/10 so they can perform ADLs without pain. PROGRESSING              2. Patient will be able to ambulate > or = 1 mile with her dog without limitation from R knee pain. MET              3. Patient will be able to complete 30 minutes of cycling without limitation from R knee pain. PROGRESSING      ASSESSMENT/SUMMARY OF CARE: Patient was seen x 10 visits. She progressed with exercise program and decreased pain.      RECOMMENDATIONS:  [x]Discontinue therapy: [x]Patient has reached or is progressing toward set goals      []Patient is non-compliant or has abdicated      []Due to lack of appreciable progress towards set goals    Xochitl Quevedo, PT 2021

## 2021-10-21 ENCOUNTER — TELEPHONE (OUTPATIENT)
Dept: SLEEP MEDICINE | Age: 46
End: 2021-10-21

## 2021-10-21 NOTE — TELEPHONE ENCOUNTER
LVM to notify the patient that [address] [place] [address] has made several unsuccessful attempts to reach her by telephone to set her up with PAP.

## 2021-11-19 ENCOUNTER — OFFICE VISIT (OUTPATIENT)
Dept: FAMILY MEDICINE CLINIC | Age: 46
End: 2021-11-19
Payer: COMMERCIAL

## 2021-11-19 VITALS
RESPIRATION RATE: 16 BRPM | DIASTOLIC BLOOD PRESSURE: 82 MMHG | OXYGEN SATURATION: 98 % | WEIGHT: 198 LBS | SYSTOLIC BLOOD PRESSURE: 126 MMHG | HEART RATE: 76 BPM | TEMPERATURE: 98.9 F | HEIGHT: 61 IN | BODY MASS INDEX: 37.38 KG/M2

## 2021-11-19 DIAGNOSIS — Z99.89 OSA ON CPAP: ICD-10-CM

## 2021-11-19 DIAGNOSIS — G43.909 MIGRAINE WITHOUT STATUS MIGRAINOSUS, NOT INTRACTABLE, UNSPECIFIED MIGRAINE TYPE: ICD-10-CM

## 2021-11-19 DIAGNOSIS — Z51.81 MEDICATION MONITORING ENCOUNTER: ICD-10-CM

## 2021-11-19 DIAGNOSIS — E66.01 SEVERE OBESITY (BMI 35.0-35.9 WITH COMORBIDITY) (HCC): Primary | ICD-10-CM

## 2021-11-19 DIAGNOSIS — F41.9 ANXIETY AND DEPRESSION: ICD-10-CM

## 2021-11-19 DIAGNOSIS — E78.2 MIXED HYPERLIPIDEMIA: ICD-10-CM

## 2021-11-19 DIAGNOSIS — E55.9 VITAMIN D DEFICIENCY: ICD-10-CM

## 2021-11-19 DIAGNOSIS — M25.561 CHRONIC PAIN OF RIGHT KNEE: ICD-10-CM

## 2021-11-19 DIAGNOSIS — K13.79 MOUTH SORES: ICD-10-CM

## 2021-11-19 DIAGNOSIS — G47.33 OSA ON CPAP: ICD-10-CM

## 2021-11-19 DIAGNOSIS — J30.89 ENVIRONMENTAL AND SEASONAL ALLERGIES: Chronic | ICD-10-CM

## 2021-11-19 DIAGNOSIS — Z13.0 SCREENING FOR DEFICIENCY ANEMIA: ICD-10-CM

## 2021-11-19 DIAGNOSIS — F32.A ANXIETY AND DEPRESSION: ICD-10-CM

## 2021-11-19 DIAGNOSIS — R74.01 ELEVATED ALT MEASUREMENT: ICD-10-CM

## 2021-11-19 DIAGNOSIS — G89.29 CHRONIC PAIN OF RIGHT KNEE: ICD-10-CM

## 2021-11-19 DIAGNOSIS — R73.03 PREDIABETES: ICD-10-CM

## 2021-11-19 PROCEDURE — 99214 OFFICE O/P EST MOD 30 MIN: CPT | Performed by: FAMILY MEDICINE

## 2021-11-19 RX ORDER — SEMAGLUTIDE 0.25 MG/.5ML
0.25 INJECTION, SOLUTION SUBCUTANEOUS
Qty: 4 EACH | Refills: 0 | Status: SHIPPED | OUTPATIENT
Start: 2021-11-19 | End: 2021-12-17

## 2021-11-19 NOTE — PROGRESS NOTES
Robel Verde  40 y.o. female  1975  [address] [facility] [address]  [id number]     [facility] PR[facility]       Encounter Date: 11/19/2021           Established Patient Visit Note: Mary Coronel MD    Reason for Appointment:  Chief Complaint   Patient presents with    Follow-up       History of Present Illness:  History provided by patient    Robel Verde is a 40 y.o. female who presents to clinic today for:    · Knee Pain: she completed PT and she reports that the knee pain is better  · Prediabetes: Last A1c 5.7 on 7/20/21; she states that Halloween derailed her a little  · Low Back Pain: working with PT  · Elevated ALT: last ALT went from 54 to 38  · Mixed Hyperlipidemia: Last lipids on 7/20/21: Chol 280, , HDL 42 and ASCVD 1.9%  · Anxiety and Depression: continues with Wellbutrin; plans to restart counseling soon; she denies any SI  · Obesity: She has been doing the [employer] program for weight loss the last 16 weeks. · Vitamin D deficiency: takes 1000 international units  Daily; last Vit D 41.9  · Migranes: she reports have a few more sinus headahce over the fall  · Right Hand Pain: she reports that she is using diclofenac cream with some improvement. She has seen ortho in the past with plan to continue monitoring. · Allergies: she takes allegra and flonase; she plans to start sinus rinse  · Mouth Sores: she reports as stable with improved sleep  · Rash: she reports that this is mostly resolved. She reports that she was not able to have visit with dermatology, and she gave up d/t it improving. · LEONIE on Bipap: she was recently start on bipap and soing well with this          She will be getting covid booster on 11/30/21        Review of Systems  Review of Systems   Constitutional: Negative for chills and fever. Respiratory: Negative for cough, shortness of breath and wheezing. Cardiovascular: Negative for chest pain and palpitations.           Allergies: Patient has no known allergies. Medications: (Updated to reflect final medication list after visit)    Current Outpatient Medications:     semaglutide, weight loss, (Wegovy) 0.25 mg/0.5 mL pnij, 0.25 mg by SubCUTAneous route every seven (7) days for 28 days. , Disp: 4 Each, Rfl: 0    buPROPion XL (WELLBUTRIN XL) 150 mg tablet, Take 1 Tablet by mouth every morning., Disp: 90 Tablet, Rfl: 1    Blisovi 24 Fe 1 mg-20 mcg (24)/75 mg (4) tab, , Disp: , Rfl:     nystatin (MYCOSTATIN) topical cream, Apply  to affected area two (2) times a day., Disp: 15 g, Rfl: 0    diclofenac (VOLTAREN) 1 % gel, Apply 2 g to affected area two (2) times daily as needed for Pain., Disp: 100 g, Rfl: 2    LO LOESTRIN FE 1 mg-10 mcg (24)/10 mcg (2) tab, TAKE 1 TABLET BY ORAL ROUTE ONCE DAILY, Disp: , Rfl: 4    cholecalciferol (VITAMIN D3) 1,000 unit tablet, Take  by mouth daily. , Disp: , Rfl:     cpap machine kit, by Does Not Apply route., Disp: , Rfl:     SUMAtriptan (IMITREX) 50 mg tablet, Take 1 Tab by mouth once as needed for Migraine (may repeat in 2 hours after initial dose) for up to 1 dose., Disp: 10 Tab, Rfl: 0    fluticasone (FLONASE) 50 mcg/actuation nasal spray, nightly., Disp: , Rfl:     multivitamin (ONE A DAY) tablet, Take 1 Tab by mouth daily. , Disp: , Rfl:     fexofenadine (ALLEGRA) 180 mg tablet, Take 180 mg by mouth daily. , Disp: , Rfl:     History  Patient Care Team:  Danica Coronel MD as PCP - General (Family Medicine)  Danica Coronel MD as PCP - [facility] Provider  Nan Landin MD (Endocrinology)  Otis Carr MD as Physician (Obstetrics & Gynecology)    Past Medical History: she has a past medical history of Allergic rhinitis, Infertility, LEONIE on CPAP (4/4/2018), Reactive depression (situational), Situational stress, and Vitamin D deficiency (2/2015). Past Surgical History: she has a past surgical history that includes hx orthopaedic (2010).     Family Medical History: family history includes Asthma in her brother and father; Heart Disease (age of onset: 76) in her father; Julián Rizzo (age of onset: 40) in her brother; Obesity in her mother. Social History: she reports that she has never smoked. She has never used smokeless tobacco. She reports current alcohol use. She reports that she does not use drugs. Health Maintenance Due   Topic Date Due    Hepatitis C Screening  Never done    Colorectal Cancer Screening Combo  Never done       Objective:   Visit Vitals  /82   Pulse 76   Temp 98.9 °F (37.2 °C)   Resp 16   Ht 5' 1\" (1.549 m)   Wt 198 lb (89.8 kg)   SpO2 98%   BMI 37.41 kg/m²     Wt Readings from Last 3 Encounters:   11/19/21 198 lb (89.8 kg)   09/01/21 197 lb (89.4 kg)   08/17/21 201 lb (91.2 kg)       Physical Exam  Vitals and nursing note reviewed. Constitutional:       General: She is not in acute distress. Appearance: Normal appearance. HENT:      Head: Normocephalic and atraumatic. Nose: Nose normal.      Mouth/Throat:      Mouth: Mucous membranes are moist.   Eyes:      Extraocular Movements: Extraocular movements intact. Conjunctiva/sclera: Conjunctivae normal.      Pupils: Pupils are equal, round, and reactive to light. Cardiovascular:      Rate and Rhythm: Normal rate and regular rhythm. Pulses: Normal pulses. Heart sounds: Normal heart sounds. No murmur heard. No friction rub. No gallop. Pulmonary:      Effort: Pulmonary effort is normal. No respiratory distress. Breath sounds: Normal breath sounds. No wheezing, rhonchi or rales. Musculoskeletal:         General: Normal range of motion. Cervical back: Normal range of motion and neck supple. No rigidity. No muscular tenderness. Lymphadenopathy:      Cervical: No cervical adenopathy. Skin:     General: Skin is warm. Coloration: Skin is not jaundiced. Neurological:      General: No focal deficit present. Mental Status: She is alert.  Mental status is at baseline. Cranial Nerves: Cranial nerves are intact. Motor: Motor function is intact. Coordination: Coordination is intact. Psychiatric:         Mood and Affect: Mood normal.         Behavior: Behavior normal.         Thought Content: Thought content normal.         Judgment: Judgment normal.         Assessment & Plan:      ICD-10-CM ICD-9-CM    1. Severe obesity (BMI 35.0-35.9 with comorbidity) (HCC)  E66.01 278.01 TSH 3RD GENERATION    Z68.35 V85.35 semaglutide, weight loss, (Wegovy) 0.25 mg/0.5 mL pnij      TSH 3RD GENERATION   2. Prediabetes  R73.03 790.29 LIPID PANEL      METABOLIC PANEL, COMPREHENSIVE      HEMOGLOBIN A1C WITH EAG      LIPID PANEL      METABOLIC PANEL, COMPREHENSIVE      HEMOGLOBIN A1C WITH EAG   3. Mixed hyperlipidemia  E78.2 272.2    4. Vitamin D deficiency  E55.9 268.9 VITAMIN D, 25 HYDROXY      CANCELED: VITAMIN D, 25 HYDROXY   5. Screening for deficiency anemia  Z13.0 V78.1 CBC W/O DIFF      CBC W/O DIFF   6. Medication monitoring encounter  Z51.81 V58.83 LIPASE      LIPASE   7. LEONIE on CPAP  G47.33 327.23     Z99.89 V46.8    8. Chronic pain of right knee  M25.561 719.46     G89.29 338.29    9. Elevated ALT measurement  R74.01 790.4    10. Anxiety and depression  F41.9 300.00     F32. A 311    11. Migraine without status migrainosus, not intractable, unspecified migraine type  G43.909 346.90    12. Mouth sores  K13.79 528.9    13. Environmental and seasonal allergies  J30.89 477.8      · Obesity: Chronic, uncontrolled. discussed medication options and associated risks/benefits/side effects/precautions; patient would like to try  [facility]. RTC 4 weeks to reassess.  All other conditions listed above: chronic and stable. Will continue current treatment regimen. Will check labs as reflected above. Discussed following up with specialist as scheduled. Discussed recommendations for diet and exercise.          I have discussed the diagnosis with the patient and the intended plan as seen in the above orders. The patient has received an after-visit summary along with patient information handout. I have discussed medication side effects and warnings with the patient as well. Disposition  Follow-up and Dispositions    · Return in about 4 weeks (around 12/17/2021) for if taking Wegovy and 3 months if not.            Sukhwinder Brunner MD

## 2021-11-19 NOTE — PROGRESS NOTES
Chief Complaint   Patient presents with    Follow-up        1. \"Have you been to the ER, urgent care clinic since your last visit? Hospitalized since your last visit? \" No    2. \"Have you seen or consulted any other health care providers outside of the [address] [place] [address] since your last visit? \" No     3. For patients aged 39-70: Has the patient had a colonoscopy? No     If the patient is female:    4. For patients aged 41-77: Has the patient had a mammogram within the past 2 years? Yes,  satisfied with blue hyperlink    5. For patients aged 21-30: Has the patient had a pap smear?  No    3 most recent PHQ Screens 6/27/2018   Little interest or pleasure in doing things Not at all   Feeling down, depressed, irritable, or hopeless Not at all   Total Score PHQ 2 0 I personally evaluated the patient. I reviewed the Resident’s or Physician Assistant’s note (as assigned above), and agree with the findings and plan except as documented in my note.  A 54 y/o f wears contact lenses presents with R eye pain, sensation of foriegm body.vising from [place], went to [facility] yesterday and reported that somethign flew in her eye, was rubbing it since then and sand paper sensation continued in her R eye so came to ed. No fever, chills, rhinorrhea, congestion, blurry vision/visual changes (has an ophthalmologist in [place]) fperiorbital swelling/ecchymoses, numbness/tingling, ha/lh/dizziness, neck pain/stiffness, weakness, trauma, erythema, or pain on EOM.  Vital Signs: I have reviewed the initial vital signs. Constitutional: WDWN in nad. Sitting on stretcher speaking full sentences. Integumentary: No rash. ENT: MMM. EYES; VIsual acuity 20/70 R eye and 20/50 L eye without contact lenses or any eye glasses which she reports is baseline if she is not wearing contact lenses, PERRL, EOM intact, No pain w/ EOM, no periorbital swelling/ecchymoses. R eye conjunctival injection. No foreign body, No hordeolum /chalazion. Fluorescein R eye corneal abrasion as noted by resident0 no ulceration no puruletn discharge.  NECK: Supple, non-tender, no meningeal signs. Cardiovascular: RRR, radial pulses 2/4 b/l. No JVD. Respiratory: BS present b/l, ctabl, no wheezing or crackles, good resp effort and excursion, good air exchange,  no accessory muscle use, no stridor. Musculoskeletal: FROM, no weakness Neurologic: AAOx3, motor 5/5 and sensation intact throughout upper and lowe ext, CN II-XII intact, No facial droop or slurring of speech. No focal deficits.

## 2021-11-20 LAB
25(OH)D3+25(OH)D2 SERPL-MCNC: 61.3 NG/ML (ref 30–100)
ALBUMIN SERPL-MCNC: 4.2 G/DL (ref 3.8–4.8)
ALBUMIN/GLOB SERPL: 1.6 {RATIO} (ref 1.2–2.2)
ALP SERPL-CCNC: 64 IU/L (ref 44–121)
ALT SERPL-CCNC: 24 IU/L (ref 0–32)
AST SERPL-CCNC: 19 IU/L (ref 0–40)
BILIRUB SERPL-MCNC: 0.3 MG/DL (ref 0–1.2)
BUN SERPL-MCNC: 8 MG/DL (ref 6–24)
BUN/CREAT SERPL: 10 (ref 9–23)
CALCIUM SERPL-MCNC: 9.1 MG/DL (ref 8.7–10.2)
CHLORIDE SERPL-SCNC: 101 MMOL/L (ref 96–106)
CHOLEST SERPL-MCNC: 275 MG/DL (ref 100–199)
CO2 SERPL-SCNC: 19 MMOL/L (ref 20–29)
CREAT SERPL-MCNC: 0.82 MG/DL (ref 0.57–1)
ERYTHROCYTE [DISTWIDTH] IN BLOOD BY AUTOMATED COUNT: 12.3 % (ref 11.7–15.4)
EST. AVERAGE GLUCOSE BLD GHB EST-MCNC: 126 MG/DL
GLOBULIN SER CALC-MCNC: 2.6 G/DL (ref 1.5–4.5)
GLUCOSE SERPL-MCNC: 93 MG/DL (ref 65–99)
HBA1C MFR BLD: 6 % (ref 4.8–5.6)
HCT VFR BLD AUTO: 39.4 % (ref 34–46.6)
HDLC SERPL-MCNC: 44 MG/DL
HGB BLD-MCNC: 13.6 G/DL (ref 11.1–15.9)
IMP & REVIEW OF LAB RESULTS: NORMAL
LDLC SERPL CALC-MCNC: 183 MG/DL (ref 0–99)
LIPASE SERPL-CCNC: 21 U/L (ref 14–72)
MCH RBC QN AUTO: 31 PG (ref 26.6–33)
MCHC RBC AUTO-ENTMCNC: 34.5 G/DL (ref 31.5–35.7)
MCV RBC AUTO: 90 FL (ref 79–97)
PLATELET # BLD AUTO: 321 X10E3/UL (ref 150–450)
POTASSIUM SERPL-SCNC: 4.2 MMOL/L (ref 3.5–5.2)
PROT SERPL-MCNC: 6.8 G/DL (ref 6–8.5)
RBC # BLD AUTO: 4.39 X10E6/UL (ref 3.77–5.28)
SODIUM SERPL-SCNC: 138 MMOL/L (ref 134–144)
TRIGL SERPL-MCNC: 250 MG/DL (ref 0–149)
TSH SERPL DL<=0.005 MIU/L-ACNC: 6.64 UIU/ML (ref 0.45–4.5)
VLDLC SERPL CALC-MCNC: 48 MG/DL (ref 5–40)
WBC # BLD AUTO: 7.9 X10E3/UL (ref 3.4–10.8)

## 2021-11-29 ENCOUNTER — PATIENT MESSAGE (OUTPATIENT)
Dept: FAMILY MEDICINE CLINIC | Age: 46
End: 2021-11-29

## 2021-12-09 NOTE — TELEPHONE ENCOUNTER
Guerda Guthrie 11/30/2021 3:00 PM EST      ----- Message -----  From: Bety Michael  Sent: 11/29/2021 4:14 PM EST  To: [facility] Nurse Pool  Subject: Last visit, blood work results, etc     Sorry I missed your call.  I could be available in the 3-4:30pm time frame most days this week if that is helpful. :)

## 2021-12-28 ENCOUNTER — VIRTUAL VISIT (OUTPATIENT)
Dept: SLEEP MEDICINE | Age: 46
End: 2021-12-28
Payer: COMMERCIAL

## 2021-12-28 ENCOUNTER — TELEPHONE (OUTPATIENT)
Dept: SLEEP MEDICINE | Age: 46
End: 2021-12-28

## 2021-12-28 DIAGNOSIS — G47.33 OSA (OBSTRUCTIVE SLEEP APNEA): Primary | ICD-10-CM

## 2021-12-28 PROCEDURE — 99213 OFFICE O/P EST LOW 20 MIN: CPT | Performed by: INTERNAL MEDICINE

## 2021-12-28 NOTE — PROGRESS NOTES
[address] [facility] [place], [address]  Tel.  [phone]  Fax. [address] [facility] 60  [place], [address] [facility]  Tel.  [phone]  Fax. [phone] [address] [facility] [address]  Tel.  [phone]  Fax. [phone]       Felicita Levy (: 1975) is a 40 y.o. female, established patient, seen for positive airway pressure follow-up and evaluation of the following chief complaint(s):   Sleep Problem ( Consent to VV inVA, send link to: [email or web address], 1st adherence)       Felicita Levy was last seen by me on 21, prior notes reviewed in detail. Initial testing done at another institution. ASSESSMENT/PLAN:    ICD-10-CM ICD-9-CM    1. LEONIE (obstructive sleep apnea)  G47.33 327.23    2. BMI 37.0-37.9, adult  Z68.37 V85.37        On ResMed:  AirCurve 10 VAuto:      Settings:  Mode - VAuto    Max IPAP: 20 cmH2O    Min EPAP: 04 cmH2O    PS: 04 cmH2O      She is adherent with PAP therapy and PAP continues to benefit patient and remains necessary for control of her sleep apnea. Follow-up and Dispositions    · Return for follow-up with nurse practioner in 12 months. 1. Sleep Apnea -   * Change pressure setting to                          * Device pressure change to Bi - Level: Mode - VAuto    Max IPAP: 20 cmH2O    Min EPAP: 09 cmH2O    PS: 04 cmH2O    * Office staff to locate and scan in initial test result, if unable to find the initial test a HSAT will be to qualify patient for continued therapy. * She is familiar with the telephone monitoring application, is willing to track therapy and agrees to notify use if AHI is >5 per hour. * Counseling was provided regarding the importance of regular PAP use with emphasis on ensuring sufficient total sleep time, proper sleep hygiene, and safe driving. * Re-enforced proper and regular cleaning for the device.     * She was asked to contact our office for any problems regarding PAP therapy. 2. Recommended a dedicated weight loss program through appropriate diet and exercise regimen as significant weight reduction has been shown to reduce severity of obstructive sleep apnea. SUBJECTIVE/OBJECTIVE:    She  is seen today for follow up on PAP device and reports no problems using the device. The following concerns identified:    Drowsiness no Problems exhaling no   Snoring no Forget to put on no   Mask Comfortable yes Can't fall asleep no   Dry Mouth no Mask falls off no   Air Leaking no Frequent awakenings no       She admits that her sleep has improved on PAP therapy using FF mask and non-heated tubing. Review of device download indicated:    Usage Days >= 4 hours 100 % over 30 days  Median Usage  7 hour 29 minutes    Average AHI: 0.1 /hr which reflects improved sleep breathing condition. [place] Sleepiness Score: 4   Modified F.O.S.Q. Score Total / 2: 19   which reflects significantly improved sleep quality over therapy time. Sleep Review of Systems: notable for Negative difficulty falling asleep; Negative awakenings at night; Negative  early morning headaches; Negative  memory problems; Negative  concentration issues; Negative  chest pain; Negative  shortness of breath;  Negative rashes or itching; Negative heartburn / belching / flatulence; Negative  significant mood issues; Occasional afternoon naps per week. Vitals reported by patient     Patient-Reported Vitals 12/28/2021   Patient-Reported Weight 190 lb   Patient-Reported Height -   Patient-Reported Temperature -   Patient-Reported Systolic  -   Patient-Reported Diastolic -   Patient-Reported LMP -      Calculated BMI 37.41 kg/m2    Physical Exam completed by visual and auditory observation of patient with verbal input from patient.     General:   Alert, oriented, not in acute distress   Eyes:  Anicteric Sclerae; no obvious strabismus   Nose:  No obvious nasal septum deviation    Neck:   Midline trachea, no visible mass   Chest/Lungs:  Respiratory effort normal, no visualized signs of difficulty breathing or respiratory distress   CVS:  No JVD   Extremities:  No obvious rashes noted on face, neck, or hands   Neuro:  No facial asymmetry, no focal deficits; no obvious tremor    Psych:  Normal affect,  normal countenance     Jeffory Boast is being evaluated by a Virtual Visit (video visit) encounter to address concerns as mentioned above. A caregiver was present when appropriate. Due to this being a TeleHealth encounter (During RZA-27 public health emergency), evaluation of the following organ systems was limited: Vitals/Constitutional/EENT/Resp/CV/GI//MS/Neuro/Skin/Heme-Lymph-Imm. Pursuant to the emergency declaration under the [address] [place] [address], [address] [place] [address] authority and the [employer] Act, this Virtual Visit was conducted with patient's (and/or legal guardian's) consent, to reduce the patient's risk of exposure to COVID-19 and provide necessary medical care. Patient identification was verified at the start of the visit: YES using name and date of birth. Patient's phone number [phone] (home) [phone] (work) was confirmed for accuracy. She gives permission for messages regarding results and appointments to be left at that number. Services were provided through a video synchronous discussion virtually to substitute for in-person clinic visit. I was in the office while conducting this encounter, patient located at their home or alternate location of their choice. An electronic signature was used to authenticate this note. Livia Parsons MD, FAASM  Electronically signed.  12/28/21

## 2021-12-28 NOTE — PATIENT INSTRUCTIONS
[facility] [facility] [place], [address]  Tel.  [phone]  Fax. [address] [facility] [address]  [place], [address] [facility]  Tel.  [phone]  Fax. [phone] [address]  Tel.  [phone]  Fax. [phone]     Learning About CPAP for Sleep Apnea  What is CPAP? CPAP is a small machine that you use at home every night while you sleep. It increases air pressure in your throat to keep your airway open. When you have sleep apnea, this can help you sleep better so you feel much better. CPAP stands for \"continuous positive airway pressure. \"  The CPAP machine will have one of the following:  · A mask that covers your nose and mouth  · Prongs that fit into your nose  · A mask that covers your nose only, the most common type. This type is called NCPAP. The N stands for \"nasal.\"  Why is it done? CPAP is usually the best treatment for obstructive sleep apnea. It is the first treatment choice and the most widely used. Your doctor may suggest CPAP if you have:  · Moderate to severe sleep apnea. · Sleep apnea and coronary artery disease (CAD) or heart failure. How does it help? · CPAP can help you have more normal sleep, so you feel less sleepy and more alert during the daytime. · CPAP may help keep heart failure or other heart problems from getting worse. · NCPAP may help lower your blood pressure. · If you use CPAP, your bed partner may also sleep better because you are not snoring or restless. What are the side effects? Some people who use CPAP have:  · A dry or stuffy nose and a sore throat. · Irritated skin on the face. · Sore eyes. · Bloating. If you have any of these problems, work with your doctor to fix them. Here are some things you can try:  · Be sure the mask or nasal prongs fit well. · See if your doctor can adjust the pressure of your CPAP. · If your nose is dry, try a humidifier.   · If your nose is runny or stuffy, try decongestant medicine or a steroid nasal spray. If these things do not help, you might try a different type of machine. Some machines have air pressure that adjusts on its own. Others have air pressures that are different when you breathe in than when you breathe out. This may reduce discomfort caused by too much pressure in your nose. Where can you learn more? Go to [employer]  Enter Gladys Colbert in the search box to learn more about \"Learning About CPAP for Sleep Apnea. \"   © 0214-2099 Healthwise, Incorporated. Care instructions adapted under license by [facility] [employer] (which disclaims liability or warranty for this information). This care instruction is for use with your licensed healthcare professional. If you have questions about a medical condition or this instruction, always ask your healthcare professional. [address] any warranty or liability for your use of this information. Content Version: 6.2.55949; Last Revised: January 11, 2010  PROPER SLEEP HYGIENE    What to avoid  · Do not have drinks with caffeine, such as coffee or black tea, for 8 hours before bed. · Do not smoke or use other types of tobacco near bedtime. Nicotine is a stimulant and can keep you awake. · Avoid drinking alcohol late in the evening, because it can cause you to wake in the middle of the night. · Do not eat a big meal close to bedtime. If you are hungry, eat a light snack. · Do not drink a lot of water close to bedtime, because the need to urinate may wake you up during the night. · Do not read or watch TV in bed. Use the bed only for sleeping and sexual activity. What to try  · Go to bed at the same time every night, and wake up at the same time every morning. Do not take naps during the day. · Keep your bedroom quiet, dark, and cool. · Get regular exercise, but not within 3 to 4 hours of your bedtime. .  · Sleep on a comfortable pillow and mattress.   · If watching the clock makes you anxious, turn it facing away from you so you cannot see the time. · If you worry when you lie down, start a worry book. Well before bedtime, write down your worries, and then set the book and your concerns aside. · Try meditation or other relaxation techniques before you go to bed. · If you cannot fall asleep, get up and go to another room until you feel sleepy. Do something relaxing. Repeat your bedtime routine before you go to bed again. · Make your house quiet and calm about an hour before bedtime. Turn down the lights, turn off the TV, log off the computer, and turn down the volume on music. This can help you relax after a busy day. Drowsy Driving: The [employer] cites drowsiness as a causing factor in more than 259,802 police reported crashes annually, resulting in 76,000 injuries and 1,500 deaths. Other surveys suggest 55% of people polled have driven while drowsy in the past year, 23% had fallen asleep but not crashed, 3% crashed, and 2% had and accident due to drowsy driving. Who is at risk? Young Drivers: One study of drowsy driving accidents states that 55% of the drivers were under 25 years. Of those, 75% were male. Shift Workers and Travelers: People who work overnight or travel across time zones frequently are at higher risk of experiencing Circadian Rhythm Disorders. They are trying to work and function when their body is programed to sleep. Sleep Deprived: Lack of sleep has a serious impact on your ability to pay attention or focus on a task. Consistently getting less than the average of 8 hours your body needs creates partial or cumulative sleep deprivation. Untreated Sleep Disorders: Sleep Apnea, Narcolepsy, R.L.S., and other sleep disorders (untreated) prevent a person from getting enough restful sleep. This leads to excessive daytime sleepiness and increases the risk for drowsy driving accidents by up to 7 times.   Medications / Alcohol: Even over the counter medications can cause drowsiness. Medications that impair a drivers attention should have a warning label. Alcohol naturally makes you sleepy and on its own can cause accidents. Combined with excessive drowsiness its effects are amplified. Signs of Drowsy Driving:   * You don't remember driving the last few miles   * You may drift out of your maranda   * You are unable to focus and your thoughts wander   * You may yawn more often than normal   * You have difficulty keeping your eyes open / nodding off   * Missing traffic signs, speeding, or tailgating  Prevention-   Good sleep hygiene, lifestyle and behavioral choices have the most impact on drowsy driving. There is no substitute for sleep and the average person requires 8 hours nightly. If you find yourself driving drowsy, stop and sleep. Consider the sleep hygiene tips provided during your visit as well. Medication Refill Policy: Refills for all medications require 1 week advance notice. Please have your pharmacy fax a refill request. We are unable to fax, or call in \"controled substance\" medications and you will need to pick these prescriptions up from our office. [email or web address] Activation    Thank you for requesting access to [email or web address]. Please follow the instructions below to securely access and download your online medical record. [email or web address] allows you to send messages to your doctor, view your test results, renew your prescriptions, schedule appointments, and more. How Do I Sign Up? 1. In your internet browser, go to [email or web address]. 2. Click on the First Time User? Click Here link in the Sign In box. You will see the New Member Sign Up page. 3. Enter your [email or web address] Access Code exactly as it appears below. You will not need to use this code after youve completed the sign-up process. If you do not sign up before the expiration date, you must request a new code. [email or web address] Access Code:  Activation code not generated  Current [employer] Status: Active (This is the date your [employer] access code will )    4. Enter the last four digits of your Social Security Number (xxxx) and Date of Birth (mm/dd/yyyy) as indicated and click Submit. You will be taken to the next sign-up page. 5. Create a [employer] ID. This will be your [employer] login ID and cannot be changed, so think of one that is secure and easy to remember. 6. Create a [employer] password. You can change your password at any time. 7. Enter your Password Reset Question and Answer. This can be used at a later time if you forget your password. 8. Enter your e-mail address. You will receive e-mail notification when new information is available in [address]. 9. Click Sign Up. You can now view and download portions of your medical record. 10. Click the Download Summary menu link to download a portable copy of your medical information. Additional Information    If you have questions, please call [phone]. Remember, [employer] is NOT to be used for urgent needs. For medical emergencies, dial 911.

## 2022-01-18 ENCOUNTER — TELEPHONE (OUTPATIENT)
Dept: SLEEP MEDICINE | Age: 47
End: 2022-01-18

## 2022-03-04 DIAGNOSIS — F32.A ANXIETY AND DEPRESSION: ICD-10-CM

## 2022-03-04 DIAGNOSIS — F41.9 ANXIETY AND DEPRESSION: ICD-10-CM

## 2022-03-04 RX ORDER — BUPROPION HYDROCHLORIDE 150 MG/1
150 TABLET ORAL
Qty: 90 TABLET | Refills: 1 | Status: SHIPPED | OUTPATIENT
Start: 2022-03-04 | End: 2022-09-05 | Stop reason: SDUPTHER

## 2022-03-04 NOTE — TELEPHONE ENCOUNTER
Last Visit: 11/19/21 MD Richardson  Next Appointment: Not scheduled  Previous Refill Encounter(s): 9/13/21 90 + 1    Requested Prescriptions     Pending Prescriptions Disp Refills    buPROPion XL (WELLBUTRIN XL) 150 mg tablet 90 Tablet 1     Sig: Take 1 Tablet by mouth every morning. Quality 226: Preventive Care And Screening: Tobacco Use: Screening And Cessation Intervention: Patient screened for tobacco use and is an ex/non-smoker Quality 431: Preventive Care And Screening: Unhealthy Alcohol Use - Screening: Patient screened for unhealthy alcohol use using a single question and scores less than 2 times per year Detail Level: Detailed

## 2022-03-18 PROBLEM — F32.A ANXIETY AND DEPRESSION: Status: ACTIVE | Noted: 2019-11-25

## 2022-03-18 PROBLEM — R74.01 ELEVATED ALT MEASUREMENT: Status: ACTIVE | Noted: 2020-10-07

## 2022-03-18 PROBLEM — F41.9 ANXIETY AND DEPRESSION: Status: ACTIVE | Noted: 2019-11-25

## 2022-03-18 PROBLEM — E66.01 SEVERE OBESITY (BMI 35.0-35.9 WITH COMORBIDITY) (HCC): Status: ACTIVE | Noted: 2020-01-07

## 2022-03-18 PROBLEM — G43.909 MIGRAINE WITHOUT STATUS MIGRAINOSUS, NOT INTRACTABLE: Status: ACTIVE | Noted: 2021-07-26

## 2022-03-18 PROBLEM — M54.50 LOW BACK PAIN: Status: ACTIVE | Noted: 2019-11-25

## 2022-03-18 PROBLEM — K13.79 MOUTH SORES: Status: ACTIVE | Noted: 2021-07-26

## 2022-03-19 PROBLEM — M19.141 POST-TRAUMATIC OSTEOARTHRITIS OF RIGHT HAND: Status: ACTIVE | Noted: 2020-01-07

## 2022-03-19 PROBLEM — M25.561 CHRONIC PAIN OF RIGHT KNEE: Status: ACTIVE | Noted: 2021-07-26

## 2022-03-19 PROBLEM — M54.6 CHRONIC BILATERAL THORACIC BACK PAIN: Status: ACTIVE | Noted: 2021-07-26

## 2022-03-19 PROBLEM — G47.33 OSA ON CPAP: Status: ACTIVE | Noted: 2018-04-04

## 2022-03-19 PROBLEM — G89.29 CHRONIC PAIN OF RIGHT KNEE: Status: ACTIVE | Noted: 2021-07-26

## 2022-03-19 PROBLEM — G89.29 CHRONIC BILATERAL THORACIC BACK PAIN: Status: ACTIVE | Noted: 2021-07-26

## 2022-03-19 PROBLEM — R73.03 PREDIABETES: Status: ACTIVE | Noted: 2020-10-07

## 2022-03-19 PROBLEM — R53.83 FATIGUE: Status: ACTIVE | Noted: 2019-11-18

## 2022-03-19 PROBLEM — Z99.89 OSA ON CPAP: Status: ACTIVE | Noted: 2018-04-04

## 2022-03-20 PROBLEM — E78.2 MIXED HYPERLIPIDEMIA: Status: ACTIVE | Noted: 2020-10-07

## 2022-03-20 PROBLEM — J30.89 ENVIRONMENTAL AND SEASONAL ALLERGIES: Status: ACTIVE | Noted: 2019-11-18

## 2022-03-25 ENCOUNTER — OFFICE VISIT (OUTPATIENT)
Dept: FAMILY MEDICINE CLINIC | Age: 47
End: 2022-03-25
Payer: COMMERCIAL

## 2022-03-25 VITALS
TEMPERATURE: 97.3 F | WEIGHT: 204.6 LBS | HEIGHT: 61 IN | SYSTOLIC BLOOD PRESSURE: 138 MMHG | BODY MASS INDEX: 38.63 KG/M2 | DIASTOLIC BLOOD PRESSURE: 82 MMHG | HEART RATE: 98 BPM | OXYGEN SATURATION: 99 %

## 2022-03-25 DIAGNOSIS — R73.03 PREDIABETES: ICD-10-CM

## 2022-03-25 DIAGNOSIS — J30.89 ENVIRONMENTAL AND SEASONAL ALLERGIES: ICD-10-CM

## 2022-03-25 DIAGNOSIS — F32.A ANXIETY AND DEPRESSION: ICD-10-CM

## 2022-03-25 DIAGNOSIS — F41.9 ANXIETY AND DEPRESSION: ICD-10-CM

## 2022-03-25 DIAGNOSIS — E66.01 SEVERE OBESITY (BMI 35.0-35.9 WITH COMORBIDITY) (HCC): Primary | ICD-10-CM

## 2022-03-25 DIAGNOSIS — E55.9 VITAMIN D DEFICIENCY: ICD-10-CM

## 2022-03-25 DIAGNOSIS — M54.6 CHRONIC BILATERAL THORACIC BACK PAIN: ICD-10-CM

## 2022-03-25 DIAGNOSIS — G89.29 CHRONIC BILATERAL THORACIC BACK PAIN: ICD-10-CM

## 2022-03-25 DIAGNOSIS — E78.2 MIXED HYPERLIPIDEMIA: ICD-10-CM

## 2022-03-25 DIAGNOSIS — G47.33 OSA TREATED WITH BIPAP: ICD-10-CM

## 2022-03-25 DIAGNOSIS — G43.909 MIGRAINE WITHOUT STATUS MIGRAINOSUS, NOT INTRACTABLE, UNSPECIFIED MIGRAINE TYPE: ICD-10-CM

## 2022-03-25 PROCEDURE — 99214 OFFICE O/P EST MOD 30 MIN: CPT | Performed by: FAMILY MEDICINE

## 2022-03-25 RX ORDER — FLUOXETINE 10 MG/1
10 CAPSULE ORAL DAILY
Qty: 90 CAPSULE | Refills: 1 | Status: SHIPPED | OUTPATIENT
Start: 2022-03-25 | End: 2022-09-01 | Stop reason: SDUPTHER

## 2022-03-25 NOTE — PROGRESS NOTES
1. \"Have you been to the ER, urgent care clinic since your last visit? Hospitalized since your last visit? \" No    2. \"Have you seen or consulted any other health care providers outside of the [address] [place] [address] since your last visit? \" No     3. For patients aged 39-70: Has the patient had a colonoscopy / FIT/ Cologuard? No      If the patient is female:    4. For patients aged 41-77: Has the patient had a mammogram within the past 2 years? Yes - no Care Gap present      5. For patients aged 21-65: Has the patient had a pap smear?  Yes - no Care Gap present

## 2022-03-25 NOTE — PROGRESS NOTES
Emy Bailey  40 y.o. female  1975  [address] [facility] [id number] [address]       Encounter Date: 3/25/2022           Established Patient Visit Note: Claudean Latch, MD    Reason for Appointment:  Chief Complaint   Patient presents with    Elbow Pain     RIGHT 1 months     Medication Evaluation         History of Present Illness:  History provided by patient    Emy Bailey is a 40 y.o. female who presents to clinic today for:     Right Arm Pain  Location: mid forarm  Duration: 1.5 months ago  Worsened by using mouse, so she switched to left handed mouse  She reports that last week she was carrying something and for 2 days after she had a lot of pain in the right arm    · Obesity: she reports that her poor mood has caused her to eat more; BI today is 38.66. She is trying to exercise more. She continues the [employer]. She plans to work on food tracking. · Prediabetes: Last A1c 6.0 on 11/19/21  · Low Back Pain: she is not doing PT d/t cost; she reports having good days and bad days; she plans to get a new matteress  · Mixed Hyperlipidemia: Last lipids on 11/19/21 with Chol 275 and  with ASCVD 2.3%; she would like to hold off on statin at this time  · Anxiety and Depression: continues with Wellbutrin (she reports that it makes her senstivie to temperature); she reports that life has been more stressful recently. She denies any SI.  · Vitamin D deficiency: takes 1000 international units  Daily; last Vit D 41.9  · Migranes: she reports that these have been okay  · Allergies: she takes allegra and flonase. · LEONIE on Bipap:followed by [facility] and compliant with BiPAP        Review of Systems  All other ROS were reviewed and are negative except as discussed in HPI        Allergies: Patient has no known allergies.     Medications:     Current Outpatient Medications:     semaglutide (Rybelsus) 3 mg tablet, Take 1 Tablet by mouth Daily (before breakfast). , Disp: 90 Tablet, Rfl: 1    FLUoxetine (PROzac) 10 mg capsule, Take 1 Capsule by mouth daily. , Disp: 90 Capsule, Rfl: 1    buPROPion XL (WELLBUTRIN XL) 150 mg tablet, Take 1 Tablet by mouth every morning., Disp: 90 Tablet, Rfl: 1    Blisovi 24 Fe 1 mg-20 mcg (24)/75 mg (4) tab, , Disp: , Rfl:     diclofenac (VOLTAREN) 1 % gel, Apply 2 g to affected area two (2) times daily as needed for Pain., Disp: 100 g, Rfl: 2    cholecalciferol (VITAMIN D3) 1,000 unit tablet, Take  by mouth daily. , Disp: , Rfl:     cpap machine kit, by Does Not Apply route., Disp: , Rfl:     SUMAtriptan (IMITREX) 50 mg tablet, Take 1 Tab by mouth once as needed for Migraine (may repeat in 2 hours after initial dose) for up to 1 dose., Disp: 10 Tab, Rfl: 0    fluticasone (FLONASE) 50 mcg/actuation nasal spray, nightly., Disp: , Rfl:     multivitamin (ONE A DAY) tablet, Take 1 Tab by mouth daily. , Disp: , Rfl:     fexofenadine (ALLEGRA) 180 mg tablet, Take 180 mg by mouth daily. , Disp: , Rfl:     nystatin (MYCOSTATIN) topical cream, Apply  to affected area two (2) times a day. (Patient not taking: Reported on 3/25/2022), Disp: 15 g, Rfl: 0    LO LOESTRIN FE 1 mg-10 mcg (24)/10 mcg (2) tab, TAKE 1 TABLET BY ORAL ROUTE ONCE DAILY (Patient not taking: Reported on 3/25/2022), Disp: , Rfl: 4    History  Patient Care Team:  Coby Zambrano MD as PCP - General (Family Medicine)  Coby Zambrano MD as PCP - [facility]  Elisa Pedersen MD (Endocrinology)  Estelle Durand MD as Physician (Obstetrics & Gynecology)    Past Medical History: she has a past medical history of Allergic rhinitis, Infertility, LEONIE on CPAP (4/4/2018), Reactive depression (situational), Situational stress, and Vitamin D deficiency (2/2015). Past Surgical History: she has a past surgical history that includes hx orthopaedic (2010).     Family Medical History: family history includes Asthma in her brother and father; Heart Disease (age of onset: 76) in her father; Shiva Dang (age of onset: 40) in her brother; Obesity in her mother. Social History: she reports that she has never smoked. She has never used smokeless tobacco. She reports current alcohol use. She reports that she does not use drugs. Objective:   Visit Vitals  /82 (BP 1 Location: Left arm, BP Patient Position: Sitting, BP Cuff Size: Large adult)   Pulse 98   Temp 97.3 °F (36.3 °C) (Temporal)   Ht 5' 1\" (1.549 m)   Wt 204 lb 9.6 oz (92.8 kg)   LMP 03/01/2022   SpO2 99%   BMI 38.66 kg/m²     Wt Readings from Last 3 Encounters:   03/25/22 204 lb 9.6 oz (92.8 kg)   11/19/21 198 lb (89.8 kg)   09/01/21 197 lb (89.4 kg)       Physical Exam  Vitals and nursing note reviewed. Constitutional:       General: She is not in acute distress. Appearance: Normal appearance. HENT:      Head: Normocephalic and atraumatic. Nose: Nose normal.      Mouth/Throat:      Mouth: Mucous membranes are moist.   Eyes:      Extraocular Movements: Extraocular movements intact. Conjunctiva/sclera: Conjunctivae normal.      Pupils: Pupils are equal, round, and reactive to light. Cardiovascular:      Rate and Rhythm: Normal rate and regular rhythm. Pulses: Normal pulses. Heart sounds: Normal heart sounds. No murmur heard. No friction rub. No gallop. Pulmonary:      Effort: Pulmonary effort is normal. No respiratory distress. Breath sounds: Normal breath sounds. No wheezing, rhonchi or rales. Musculoskeletal:         General: Normal range of motion. Cervical back: Normal range of motion and neck supple. No rigidity. No muscular tenderness. Lymphadenopathy:      Cervical: No cervical adenopathy. Skin:     General: Skin is warm. Coloration: Skin is not jaundiced. Neurological:      General: No focal deficit present. Mental Status: She is alert. Mental status is at baseline.       Cranial Nerves: Cranial nerves are intact. Motor: Motor function is intact. Coordination: Coordination is intact. Psychiatric:         Mood and Affect: Mood normal.         Behavior: Behavior normal.         Thought Content: Thought content normal.         Judgment: Judgment normal.         Assessment & Plan:      ICD-10-CM ICD-9-CM    1. Severe obesity (BMI 35.0-35.9 with comorbidity) ([facility])  E66.01 278.01 semaglutide (Rybelsus) 3 mg tablet    Z68.35 V85.35    2. Anxiety and depression  F41.9 300.00 FLUoxetine (PROzac) 10 mg capsule    F32. A 311    3. Prediabetes  R73.03 790.29    4. Chronic bilateral thoracic back pain  M54.6 724.1     G89.29 338.29    5. Mixed hyperlipidemia  E78.2 272.2    6. Vitamin D deficiency  E55.9 268.9    7. Migraine without status migrainosus, not intractable, unspecified migraine type  G43.909 346.90    8. Environmental and seasonal allergies  J30.89 477.8    9. LEONIE treated with BiPAP  G47.33 327.23      · Obesity: Chronic, uncontrolled. discussed medication options and associated risks/benefits/side effects/precautions; patient would like to try  Rybelsus. Will start rybelsus (advised to wait until two weeks on Prozac)  · Anxiety and Depression: Chronic, uncontrolled. discussed medication options and associated risks/benefits/side effects/precautions; patient would like to try  Prozac. RTC 4 weeks to reassess. · Right Arm Pain: Pain consistent with Extensore tendoopathy in setting of overuse: discussed acitivity modification and diclfenace gel. Will consider referral to ortho if not improved at next visit.  All other conditions listed above: Chronic, stable and/or managed by specialist. Will continue current treatment regimen. Discussed following up with specialist as scheduled. Discussed recommendations for diet and exercise. I have discussed the diagnosis with the patient and the intended plan as seen in the above orders.   The patient has received an after-visit summary along with patient information handout. I have discussed medication side effects and warnings with the patient as well. Disposition  Follow-up and Dispositions    · Return in about 4 weeks (around 4/22/2022) for follow up of chronic conditions.            Deyvi Thomas MD

## 2022-04-26 ENCOUNTER — TELEPHONE (OUTPATIENT)
Dept: FAMILY MEDICINE CLINIC | Age: 47
End: 2022-04-26

## 2022-05-02 ENCOUNTER — OFFICE VISIT (OUTPATIENT)
Dept: FAMILY MEDICINE CLINIC | Age: 47
End: 2022-05-02
Payer: COMMERCIAL

## 2022-05-02 VITALS
RESPIRATION RATE: 16 BRPM | SYSTOLIC BLOOD PRESSURE: 150 MMHG | HEIGHT: 61 IN | WEIGHT: 205 LBS | OXYGEN SATURATION: 99 % | TEMPERATURE: 98.3 F | BODY MASS INDEX: 38.71 KG/M2 | HEART RATE: 98 BPM | DIASTOLIC BLOOD PRESSURE: 78 MMHG

## 2022-05-02 DIAGNOSIS — G43.909 MIGRAINE WITHOUT STATUS MIGRAINOSUS, NOT INTRACTABLE, UNSPECIFIED MIGRAINE TYPE: ICD-10-CM

## 2022-05-02 DIAGNOSIS — R73.03 PREDIABETES: ICD-10-CM

## 2022-05-02 DIAGNOSIS — G89.29 CHRONIC MIDLINE LOW BACK PAIN WITHOUT SCIATICA: ICD-10-CM

## 2022-05-02 DIAGNOSIS — J30.89 ENVIRONMENTAL AND SEASONAL ALLERGIES: ICD-10-CM

## 2022-05-02 DIAGNOSIS — Z71.89 CARDIAC RISK COUNSELING: ICD-10-CM

## 2022-05-02 DIAGNOSIS — E78.2 MIXED HYPERLIPIDEMIA: ICD-10-CM

## 2022-05-02 DIAGNOSIS — M54.50 CHRONIC MIDLINE LOW BACK PAIN WITHOUT SCIATICA: ICD-10-CM

## 2022-05-02 DIAGNOSIS — G47.33 OSA TREATED WITH BIPAP: ICD-10-CM

## 2022-05-02 DIAGNOSIS — E66.01 SEVERE OBESITY (BMI 35.0-35.9 WITH COMORBIDITY) (HCC): Primary | ICD-10-CM

## 2022-05-02 DIAGNOSIS — F41.9 ANXIETY AND DEPRESSION: ICD-10-CM

## 2022-05-02 DIAGNOSIS — Z12.11 COLON CANCER SCREENING: ICD-10-CM

## 2022-05-02 DIAGNOSIS — F32.A ANXIETY AND DEPRESSION: ICD-10-CM

## 2022-05-02 DIAGNOSIS — E55.9 VITAMIN D DEFICIENCY: ICD-10-CM

## 2022-05-02 PROCEDURE — 99214 OFFICE O/P EST MOD 30 MIN: CPT | Performed by: FAMILY MEDICINE

## 2022-05-02 NOTE — PROGRESS NOTES
Amira Ventura  56 y.o. female  1975  [address] [facility] [address] [address]       Encounter Date: 5/2/2022           Established Patient Visit Note: Azucena Martin MD    Reason for Appointment:  Chief Complaint   Patient presents with    Cholesterol Problem    Follow Up Chronic Condition         History of Present Illness:  History provided by patient    Amira Ventura is a 56 y.o. female who presents to clinic today for:       · Right Arm Pain:  she reports that this has improved with activity modification ( is in charge of holding leash for dog). · Obesity: Rybelsus attempted at last visit, but not approved by insurance. She continues the [employer]. She plans to work on food tracking. She reports having gastrointestinal side effects from phenteramine. · Prediabetes: Last A1c 6.0 on 11/19/21  · Low Back Pain: she is not doing PT d/t cost; she reports having good days and bad days; she plans to get a new matteress  · Mixed Hyperlipidemia: Last lipids on 11/19/21 with Chol 275 and  with ASCVD 2.3%; she would like to hold off on statin at this time  · Anxiety and Depression: started on Prozac at last visit, and she reports that this has helped with her mood. She continues. She denies any SI. She would like to taper off of wellbutrin. · Vitamin D deficiency: takes 1000 international units  Daily  · Migranes: she reports that these have been okay  · Allergies: she takes allegra and flonase. · LEONIE on Bipap:[facility]ed [facility] and compliant with BiPAP      Review of Systems  All other ROS were reviewed and are negative except as discussed in HPI        Allergies: Patient has no known allergies. Medications:     Current Outpatient Medications:     Orlistat 60 mg capsule, Take 60 mg by mouth three (3) times daily (with meals). , Disp: 90 Capsule, Rfl: 2    FLUoxetine (PROzac) 10 mg capsule, Take 1 Capsule by mouth daily., Disp: 90 Capsule, Rfl: 1    buPROPion XL (WELLBUTRIN XL) 150 mg tablet, Take 1 Tablet by mouth every morning., Disp: 90 Tablet, Rfl: 1    nystatin (MYCOSTATIN) topical cream, Apply  to affected area two (2) times a day., Disp: 15 g, Rfl: 0    diclofenac (VOLTAREN) 1 % gel, Apply 2 g to affected area two (2) times daily as needed for Pain., Disp: 100 g, Rfl: 2    LO LOESTRIN FE 1 mg-10 mcg (24)/10 mcg (2) tab, TAKE 1 TABLET BY ORAL ROUTE ONCE DAILY, Disp: , Rfl: 4    cholecalciferol (VITAMIN D3) 1,000 unit tablet, Take  by mouth daily. , Disp: , Rfl:     cpap machine kit, by Does Not Apply route., Disp: , Rfl:     SUMAtriptan (IMITREX) 50 mg tablet, Take 1 Tab by mouth once as needed for Migraine (may repeat in 2 hours after initial dose) for up to 1 dose., Disp: 10 Tab, Rfl: 0    fluticasone (FLONASE) 50 mcg/actuation nasal spray, nightly., Disp: , Rfl:     multivitamin (ONE A DAY) tablet, Take 1 Tab by mouth daily. , Disp: , Rfl:     fexofenadine (ALLEGRA) 180 mg tablet, Take 180 mg by mouth daily. , Disp: , Rfl:     History  Patient Care Team:  Dickson Gosselin, MD as PCP - General (Family Medicine)  Dickson Gosselin, MD as PCP - [facility]  Corky Arenas MD (Endocrinology Physician)  Jacqui Elkins MD as Physician (Obstetrics & Gynecology)    Past Medical History: she has a past medical history of Allergic rhinitis, Infertility, LEONIE on CPAP (4/4/2018), Reactive depression (situational), Situational stress, and Vitamin D deficiency (2/2015). Past Surgical History: she has a past surgical history that includes hx orthopaedic (2010). Family Medical History: family history includes Asthma in her brother and father; Heart Disease (age of onset: 76) in her father; Saturnino Pillion (age of onset: 40) in her brother; Obesity in her mother. Social History: she reports that she has never smoked. She has never used smokeless tobacco. She reports current alcohol use.  She reports that she does not use drugs. Objective:   Visit Vitals  BP (!) 150/78 (BP 1 Location: Left upper arm, BP Patient Position: Sitting, BP Cuff Size: Large adult)   Pulse 98   Temp 98.3 °F (36.8 °C) (Temporal)   Resp 16   Ht 5' 1\" (1.549 m)   Wt 205 lb (93 kg)   LMP 04/28/2022 (Approximate)   SpO2 99%   BMI 38.73 kg/m²     Wt Readings from Last 3 Encounters:   05/02/22 205 lb (93 kg)   03/25/22 204 lb 9.6 oz (92.8 kg)   11/19/21 198 lb (89.8 kg)       Physical Exam  Vitals and nursing note reviewed. Constitutional:       General: She is not in acute distress. Appearance: Normal appearance. HENT:      Head: Normocephalic and atraumatic. Nose: Nose normal.      Mouth/Throat:      Mouth: Mucous membranes are moist.   Eyes:      Extraocular Movements: Extraocular movements intact. Conjunctiva/sclera: Conjunctivae normal.      Pupils: Pupils are equal, round, and reactive to light. Cardiovascular:      Rate and Rhythm: Normal rate and regular rhythm. Pulses: Normal pulses. Heart sounds: Normal heart sounds. No murmur heard. No friction rub. No gallop. Pulmonary:      Effort: Pulmonary effort is normal. No respiratory distress. Breath sounds: Normal breath sounds. No wheezing, rhonchi or rales. Musculoskeletal:         General: Normal range of motion. Cervical back: Normal range of motion and neck supple. No rigidity. No muscular tenderness. Lymphadenopathy:      Cervical: No cervical adenopathy. Skin:     General: Skin is warm. Coloration: Skin is not jaundiced. Neurological:      General: No focal deficit present. Mental Status: She is alert. Mental status is at baseline. Cranial Nerves: Cranial nerves are intact. Motor: Motor function is intact. Coordination: Coordination is intact. Psychiatric:         Mood and Affect: Mood normal.         Behavior: Behavior normal.         Thought Content:  Thought content normal. Judgment: Judgment normal.         Assessment & Plan:      ICD-10-CM ICD-9-CM    1. Severe obesity (BMI 35.0-35.9 with comorbidity) (HCC)  E66.01 278.01 Orlistat 60 mg capsule    Z68.35 V85.35    2. Prediabetes  R73.03 790.29 HEMOGLOBIN A1C WITH EAG      URINALYSIS W/ RFLX MICROSCOPIC      HEMOGLOBIN A1C WITH EAG      URINALYSIS W/ RFLX MICROSCOPIC   3. Mixed hyperlipidemia  E78.2 272.2 CBC W/O DIFF      LIPID PANEL      METABOLIC PANEL, COMPREHENSIVE      TSH 3RD GENERATION      CBC W/O DIFF      LIPID PANEL      METABOLIC PANEL, COMPREHENSIVE      TSH 3RD GENERATION   4. Vitamin D deficiency  E55.9 268.9 VITAMIN D, 25 HYDROXY      VITAMIN D, 25 HYDROXY   5. Colon cancer screening  Z12.11 V76.51 REFERRAL TO GASTROENTEROLOGY   6. Cardiac risk counseling  Z71.89 V65.49 CT HEART W/O CONT WITH CALCIUM   7. Chronic midline low back pain without sciatica  M54.50 724.2     G89.29 338.29    8. Anxiety and depression  F41.9 300.00     F32. A 311    9. Migraine without status migrainosus, not intractable, unspecified migraine type  G43.909 346.90    10. Environmental and seasonal allergies  J30.89 477.8    11. LEONIE treated with BiPAP  G47.33 327.23      · Enrique Depression: sity: Chronic, uncontrolled. discussed medication options and associated risks/benefits/side effects/precautions; patient would like to try  Orlistat. RTC 4 weeks to reassess. · Anxiety anPatient would like to stop Wellbutrin. Discussed taper schedule for Wellbutrin   All other conditions listed above: Chronic, stable and/or managed by specialist. Will continue current treatment regimen. Will place lab orders to be performed prior to next visit. Discussed following up with specialist as scheduled. Discussed recommendations for diet and exercise. I have discussed the diagnosis with the patient and the intended plan as seen in the above orders. The patient has received an after-visit summary along with patient information handout.   I have discussed medication side effects and warnings with the patient as well. Disposition  Follow-up and Dispositions    · Return in about 3 months (around 8/2/2022) for follow up of chronic conditions.            Janak Garcia MD

## 2022-05-02 NOTE — PROGRESS NOTES
Chief Complaint   Patient presents with    Cholesterol Problem    Follow Up Chronic Condition     1. \"Have you been to the ER, urgent care clinic since your last visit? Hospitalized since your last visit? \" no    2. \"Have you seen or consulted any other health care providers outside of the [address] [place] [address] since your last visit? \"  no    3. For patients aged 39-70: Has the patient had a colonoscopy / FIT/ Cologuard? no      If the patient is female:    4. For patients aged 41-77: Has the patient had a mammogram within the past 2 years? No gap      5. For patients aged 21-65: Has the patient had a pap smear?    No gap

## 2022-07-26 ENCOUNTER — APPOINTMENT (OUTPATIENT)
Dept: FAMILY MEDICINE CLINIC | Age: 47
End: 2022-07-26

## 2022-07-27 LAB
25(OH)D3+25(OH)D2 SERPL-MCNC: 51.6 NG/ML (ref 30–100)
ALBUMIN SERPL-MCNC: 4.3 G/DL (ref 3.8–4.8)
ALBUMIN/GLOB SERPL: 2 {RATIO} (ref 1.2–2.2)
ALP SERPL-CCNC: 69 IU/L (ref 44–121)
ALT SERPL-CCNC: 24 IU/L (ref 0–32)
APPEARANCE UR: ABNORMAL
AST SERPL-CCNC: 20 IU/L (ref 0–40)
BACTERIA #/AREA URNS HPF: ABNORMAL /[HPF]
BILIRUB SERPL-MCNC: 0.4 MG/DL (ref 0–1.2)
BILIRUB UR QL STRIP: NEGATIVE
BUN SERPL-MCNC: 10 MG/DL (ref 6–24)
BUN/CREAT SERPL: 14 (ref 9–23)
CALCIUM SERPL-MCNC: 9.3 MG/DL (ref 8.7–10.2)
CASTS URNS QL MICRO: ABNORMAL /LPF
CHLORIDE SERPL-SCNC: 101 MMOL/L (ref 96–106)
CHOLEST SERPL-MCNC: 269 MG/DL (ref 100–199)
CO2 SERPL-SCNC: 22 MMOL/L (ref 20–29)
COLOR UR: YELLOW
CREAT SERPL-MCNC: 0.69 MG/DL (ref 0.57–1)
EGFR: 108 ML/MIN/1.73
EPI CELLS #/AREA URNS HPF: >10 /HPF (ref 0–10)
ERYTHROCYTE [DISTWIDTH] IN BLOOD BY AUTOMATED COUNT: 12.8 % (ref 11.7–15.4)
EST. AVERAGE GLUCOSE BLD GHB EST-MCNC: 117 MG/DL
GLOBULIN SER CALC-MCNC: 2.2 G/DL (ref 1.5–4.5)
GLUCOSE SERPL-MCNC: 99 MG/DL (ref 65–99)
GLUCOSE UR QL STRIP: NEGATIVE
HBA1C MFR BLD: 5.7 % (ref 4.8–5.6)
HCT VFR BLD AUTO: 38.2 % (ref 34–46.6)
HDLC SERPL-MCNC: 44 MG/DL
HGB BLD-MCNC: 12.8 G/DL (ref 11.1–15.9)
HGB UR QL STRIP: NEGATIVE
IMP & REVIEW OF LAB RESULTS: NORMAL
KETONES UR QL STRIP: NEGATIVE
LDLC SERPL CALC-MCNC: 159 MG/DL (ref 0–99)
LEUKOCYTE ESTERASE UR QL STRIP: ABNORMAL
MCH RBC QN AUTO: 31 PG (ref 26.6–33)
MCHC RBC AUTO-ENTMCNC: 33.5 G/DL (ref 31.5–35.7)
MCV RBC AUTO: 93 FL (ref 79–97)
MICRO URNS: ABNORMAL
NITRITE UR QL STRIP: NEGATIVE
PH UR STRIP: 6 [PH] (ref 5–7.5)
PLATELET # BLD AUTO: 270 X10E3/UL (ref 150–450)
POTASSIUM SERPL-SCNC: 4.6 MMOL/L (ref 3.5–5.2)
PROT SERPL-MCNC: 6.5 G/DL (ref 6–8.5)
PROT UR QL STRIP: NEGATIVE
RBC # BLD AUTO: 4.13 X10E6/UL (ref 3.77–5.28)
RBC #/AREA URNS HPF: ABNORMAL /HPF (ref 0–2)
SODIUM SERPL-SCNC: 138 MMOL/L (ref 134–144)
SP GR UR STRIP: 1.01 (ref 1–1.03)
TRIGL SERPL-MCNC: 352 MG/DL (ref 0–149)
TSH SERPL DL<=0.005 MIU/L-ACNC: 3.61 UIU/ML (ref 0.45–4.5)
UROBILINOGEN UR STRIP-MCNC: 0.2 MG/DL (ref 0.2–1)
VLDLC SERPL CALC-MCNC: 66 MG/DL (ref 5–40)
WBC # BLD AUTO: 7.8 X10E3/UL (ref 3.4–10.8)
WBC #/AREA URNS HPF: ABNORMAL /HPF (ref 0–5)

## 2022-07-27 NOTE — PROGRESS NOTES
Called patient regarding UA. She denies any urinary symptoms. She reports that blood in urine may be related to MP. Will recheck at follow up visit.

## 2022-08-02 ENCOUNTER — OFFICE VISIT (OUTPATIENT)
Dept: FAMILY MEDICINE CLINIC | Age: 47
End: 2022-08-02
Payer: COMMERCIAL

## 2022-08-02 VITALS
OXYGEN SATURATION: 98 % | BODY MASS INDEX: 39.99 KG/M2 | WEIGHT: 211.8 LBS | HEART RATE: 89 BPM | SYSTOLIC BLOOD PRESSURE: 138 MMHG | TEMPERATURE: 98.1 F | DIASTOLIC BLOOD PRESSURE: 85 MMHG | HEIGHT: 61 IN | RESPIRATION RATE: 16 BRPM

## 2022-08-02 DIAGNOSIS — G89.29 CHRONIC BILATERAL THORACIC BACK PAIN: ICD-10-CM

## 2022-08-02 DIAGNOSIS — R73.03 PREDIABETES: Primary | ICD-10-CM

## 2022-08-02 DIAGNOSIS — J30.89 ENVIRONMENTAL AND SEASONAL ALLERGIES: ICD-10-CM

## 2022-08-02 DIAGNOSIS — E66.01 SEVERE OBESITY (BMI 35.0-35.9 WITH COMORBIDITY) (HCC): ICD-10-CM

## 2022-08-02 DIAGNOSIS — G43.909 MIGRAINE WITHOUT STATUS MIGRAINOSUS, NOT INTRACTABLE, UNSPECIFIED MIGRAINE TYPE: ICD-10-CM

## 2022-08-02 DIAGNOSIS — F41.9 ANXIETY AND DEPRESSION: ICD-10-CM

## 2022-08-02 DIAGNOSIS — E78.2 MIXED HYPERLIPIDEMIA: ICD-10-CM

## 2022-08-02 DIAGNOSIS — E55.9 VITAMIN D DEFICIENCY: ICD-10-CM

## 2022-08-02 DIAGNOSIS — F32.A ANXIETY AND DEPRESSION: ICD-10-CM

## 2022-08-02 DIAGNOSIS — M54.6 CHRONIC BILATERAL THORACIC BACK PAIN: ICD-10-CM

## 2022-08-02 DIAGNOSIS — G47.33 OSA TREATED WITH BIPAP: ICD-10-CM

## 2022-08-02 PROCEDURE — 99214 OFFICE O/P EST MOD 30 MIN: CPT | Performed by: FAMILY MEDICINE

## 2022-08-02 RX ORDER — METFORMIN HYDROCHLORIDE 500 MG/1
500 TABLET, EXTENDED RELEASE ORAL
Qty: 90 TABLET | Refills: 1 | Status: SHIPPED | OUTPATIENT
Start: 2022-08-02

## 2022-08-02 NOTE — PROGRESS NOTES
Geno Abebe  56 y.o. female  1975  [address] [facility] [address]  [address]       Encounter Date: 8/2/2022           Established Patient Visit Note: Amadou High MD    Reason for Appointment:  Chief Complaint   Patient presents with    Follow-up         History of Present Illness:  History provided by patient    Geno Abebe is a 56 y.o. female who presents to clinic today for:     Obesity:  Wegovy and Rybelsus not covered by [employer]. She continues the [employer]. She plans to work on food tracking. She reports having gastrointestinal side effects from phenteramine. She continues orlistat, but she reports having stomach symptoms with this She plans to go to the [facility] soon  Prediabetes: Last A1c was 5.7 on 7/26/22  Low Back Pain: continues having good days and bad days  Mixed Hyperlipidemia: continues work on eating healthy and exercise  Anxiety and Depression: She continues Prozac. She decieded to stay on the Wellbutrin, and she feels that it in combination with Prozac is working well. She continues counseling, which she reports as helpful. Vitamin D deficiency: takes 1000 international units  Daily  Migranes: she reports that these have been okay  Allergies: she takes allegra and flonase. LEONIE on Bipap:followed by seep medicine and compliant with BiPAP  Insect Bite: she reports that last Thursday she had an insect bite and has become a little red. It is located on her left arm. Abnormal UA: she repors that this likely 2/2 LMP      HM  CCS: she had colonoscopy with Dr. Nela Jensen showing 1 polyp; repeat recommended in seven years  PAP: followed by GYN (Dr. Ave Dasilva)          Review of Systems  All other ROS were reviewed and are negative except as discussed in HPI           Allergies: Patient has no known allergies.     Medications:     Current Outpatient Medications:     metFORMIN ER (GLUCOPHAGE XR) 500 mg tablet, Take 1 Tablet by mouth daily (with dinner). , Disp: 90 Tablet, Rfl: 1    FLUoxetine (PROzac) 10 mg capsule, Take 1 Capsule by mouth daily. , Disp: 90 Capsule, Rfl: 1    buPROPion XL (WELLBUTRIN XL) 150 mg tablet, Take 1 Tablet by mouth every morning., Disp: 90 Tablet, Rfl: 1    diclofenac (VOLTAREN) 1 % gel, Apply 2 g to affected area two (2) times daily as needed for Pain., Disp: 100 g, Rfl: 2    LO LOESTRIN FE 1 mg-10 mcg (24)/10 mcg (2) tab, TAKE 1 TABLET BY ORAL ROUTE ONCE DAILY, Disp: , Rfl: 4    cholecalciferol (VITAMIN D3) (1000 Units /25 mcg) tablet, Take  by mouth daily. , Disp: , Rfl:     cpap machine kit, by Does Not Apply route., Disp: , Rfl:     SUMAtriptan (IMITREX) 50 mg tablet, Take 1 Tab by mouth once as needed for Migraine (may repeat in 2 hours after initial dose) for up to 1 dose., Disp: 10 Tab, Rfl: 0    fluticasone propionate (FLONASE) 50 mcg/actuation nasal spray, nightly., Disp: , Rfl:     multivitamin (ONE A DAY) tablet, Take 1 Tab by mouth daily. , Disp: , Rfl:     fexofenadine (ALLEGRA) 180 mg tablet, Take 180 mg by mouth daily. , Disp: , Rfl:     nirmatrelvir-ritonavir (Paxlovid, EUA,) 300 mg (150 mg x 2)-100 mg tablet, Use as written on package, Disp: 1 Box, Rfl: 0    guaiFENesin ER (MUCINEX) 600 mg ER tablet, Take 1 Tablet by mouth two (2) times a day., Disp: 10 Tablet, Rfl: 1    benzonatate (TESSALON) 200 mg capsule, Take 1 Capsule by mouth three (3) times daily as needed for Cough for up to 7 days. , Disp: 15 Capsule, Rfl: 0    nystatin (MYCOSTATIN) topical cream, Apply  to affected area two (2) times a day.  (Patient not taking: Reported on 8/2/2022), Disp: 15 g, Rfl: 0    History  Patient Care Team:  Bruce Chaney MD as PCP - General (Family Medicine)  Bruce Chaney MD as PCP - [facility] Empaneled Provider  Jessica Palacio MD (Endocrinology Physician)  More Dela Cruz MD as Physician (Obstetrics & Gynecology)    Past Medical History: she has a past medical history of Allergic rhinitis, Infertility, LEONIE on CPAP (4/4/2018), Reactive depression (situational), Situational stress, and Vitamin D deficiency (2/2015). Past Surgical History: she has a past surgical history that includes hx orthopaedic (2010). Family Medical History: family history includes Asthma in her brother and father; Heart Disease (age of onset: 76) in her father; Pita Hun (age of onset: 40) in her brother; Obesity in her mother. Social History: she reports that she has never smoked. She has never used smokeless tobacco. She reports current alcohol use. She reports that she does not use drugs. Objective:   Visit Vitals  /85 (BP 1 Location: Right arm, BP Patient Position: Sitting, BP Cuff Size: Adult long)   Pulse 89   Temp 98.1 °F (36.7 °C) (Temporal)   Resp 16   Ht 5' 1\" (1.549 m)   Wt 211 lb 12.8 oz (96.1 kg)   LMP 07/17/2022 (Approximate)   SpO2 98%   BMI 40.02 kg/m²     Wt Readings from Last 3 Encounters:   08/02/22 211 lb 12.8 oz (96.1 kg)   05/02/22 205 lb (93 kg)   03/25/22 204 lb 9.6 oz (92.8 kg)       Physical Exam  Vitals and nursing note reviewed. Constitutional:       General: She is not in acute distress. Appearance: Normal appearance. HENT:      Head: Normocephalic and atraumatic. Nose: Nose normal.      Mouth/Throat:      Mouth: Mucous membranes are moist.   Eyes:      Extraocular Movements: Extraocular movements intact. Conjunctiva/sclera: Conjunctivae normal.      Pupils: Pupils are equal, round, and reactive to light. Cardiovascular:      Rate and Rhythm: Normal rate and regular rhythm. Pulses: Normal pulses. Heart sounds: Normal heart sounds. No murmur heard. No friction rub. No gallop. Pulmonary:      Effort: Pulmonary effort is normal. No respiratory distress. Breath sounds: Normal breath sounds. No wheezing, rhonchi or rales. Musculoskeletal:         General: Normal range of motion. Cervical back: Normal range of motion and neck supple.  No rigidity. No muscular tenderness. Lymphadenopathy:      Cervical: No cervical adenopathy. Skin:     General: Skin is warm. Coloration: Skin is not jaundiced. Comments: Rash: 1cm erythematous area on flexular surface of left arm. Neurological:      General: No focal deficit present. Mental Status: She is alert. Mental status is at baseline. Cranial Nerves: Cranial nerves are intact. Motor: Motor function is intact. Coordination: Coordination is intact. Psychiatric:         Mood and Affect: Mood normal.         Behavior: Behavior normal.         Thought Content: Thought content normal.         Judgment: Judgment normal.             Assessment & Plan:      ICD-10-CM ICD-9-CM    1. Prediabetes  R73.03 790.29 metFORMIN ER (GLUCOPHAGE XR) 500 mg tablet      2. Severe obesity (BMI 35.0-35.9 with comorbidity) ([facility])  E66.01 278.01     Z68.35 V85.35       3. Vitamin D deficiency  E55.9 268.9       4. Mixed hyperlipidemia  E78.2 272.2       5. Anxiety and depression  F41.9 300.00     F32. A 311       6. Migraine without status migrainosus, not intractable, unspecified migraine type  G43.909 346.90       7. Environmental and seasonal allergies  J30.89 477.8       8. LEONIE treated with BiPAP  G47.33 327.23       9. Chronic bilateral thoracic back pain  M54.6 724.1     G89.29 338.29         Obesity: I have reviewed/discussed the above normal BMI with the patient. I have recommended the following interventions: dietary management education, guidance, and counseling, encourage exercise, monitor weight and prescribed dietary intake. Stop orlistat d/t side effects  Prediabets: Chronic, uncontrolled.  discussed medication options and associated risks/benefits/side effects/precautions; patient would like to try  metformin  Insect Bite: Advised using benadryl cream. Discussed red flag symptoms and reasons to call or go to ED  All other conditions listed above: Chronic, stable and/or managed by specialist. Will continue current treatment regimen. Reviewed recent labs with patient. Discussed following up with specialist as scheduled. Discussed recommendations for diet and exercise. I have discussed the diagnosis with the patient and the intended plan as seen in the above orders. The patient has received an after-visit summary along with patient information handout. I have discussed medication side effects and warnings with the patient as well. Disposition  Follow-up and Dispositions    Return in about 3 months (around 11/2/2022).            Celso Mendez MD

## 2022-08-02 NOTE — PROGRESS NOTES
Chief Complaint   Patient presents with    Follow-up     1. Have you been to the ER, urgent care clinic since your last visit? Hospitalized since your last visit? No    2. Have you seen or consulted any other health care providers outside of the [address] [place] [address] since your last visit? Include any pap smears or colon screening.  No

## 2022-08-03 ENCOUNTER — VIRTUAL VISIT (OUTPATIENT)
Dept: FAMILY MEDICINE CLINIC | Age: 47
End: 2022-08-03
Payer: COMMERCIAL

## 2022-08-03 DIAGNOSIS — J06.9 URI WITH COUGH AND CONGESTION: Primary | ICD-10-CM

## 2022-08-03 PROCEDURE — 99213 OFFICE O/P EST LOW 20 MIN: CPT | Performed by: FAMILY MEDICINE

## 2022-08-03 RX ORDER — GUAIFENESIN 600 MG/1
600 TABLET, EXTENDED RELEASE ORAL 2 TIMES DAILY
Qty: 10 TABLET | Refills: 1 | Status: SHIPPED | OUTPATIENT
Start: 2022-08-03

## 2022-08-03 RX ORDER — BENZONATATE 200 MG/1
200 CAPSULE ORAL
Qty: 15 CAPSULE | Refills: 0 | Status: SHIPPED | OUTPATIENT
Start: 2022-08-03 | End: 2022-08-10

## 2022-08-03 NOTE — LETTER
NOTIFICATION RETURN TO WORK / SCHOOL    8/3/2022 1:21 PM    Ms. [address] [address]      To Whom It May Concern:    Rowena Miles is currently under the care of GREGOR Barnes 54. Please excuse patient from work 8/3/22 to 8/5/22  She will return to work/school on: 8/8/22    If there are questions or concerns please have the patient contact our office.         Sincerely,          Matthieu Jimenez MD

## 2022-08-03 NOTE — PROGRESS NOTES
Ang Swenson  56 y.o. female  1975  [address] [facility] [address]  [address]       Encounter Date: 8/3/2022           Established Patient Visit Note: Oswald Frnacois MD    Reason for Appointment:  Chief Complaint   Patient presents with    Cough         History of Present Illness:  History provided by patient    Ang Swenson is a 56 y.o. female who presents today for:       Cough and Congestion  Duration: started yesterday evening  Symptoms: Cough intermittently productive of clear phlegm, nasal congestion, headache, muscle aches between shoulder blades, fatigue, mild sore throat from the cough, denies any ear pain or fullness  AS: denies dyspnea, CP, denies loss of smell or taste  She took home COVID test last night tat was negative  She reports that her temperature this morning was 98.8 when she got up and 99.2 later on  Has had COVID vaccine and booster  Medicines: she is taking Robitussin cough syrup, which she reports has helped a little. Denies any sick contacts, but she has been to a few concerts recently        Review of Systems  See HPI      Allergies: Patient has no known allergies. Medications:     Current Outpatient Medications:     guaiFENesin ER (MUCINEX) 600 mg ER tablet, Take 1 Tablet by mouth two (2) times a day., Disp: 10 Tablet, Rfl: 1    benzonatate (TESSALON) 200 mg capsule, Take 1 Capsule by mouth three (3) times daily as needed for Cough for up to 7 days. , Disp: 15 Capsule, Rfl: 0    metFORMIN ER (GLUCOPHAGE XR) 500 mg tablet, Take 1 Tablet by mouth daily (with dinner). , Disp: 90 Tablet, Rfl: 1    FLUoxetine (PROzac) 10 mg capsule, Take 1 Capsule by mouth daily. , Disp: 90 Capsule, Rfl: 1    buPROPion XL (WELLBUTRIN XL) 150 mg tablet, Take 1 Tablet by mouth every morning., Disp: 90 Tablet, Rfl: 1    nystatin (MYCOSTATIN) topical cream, Apply  to affected area two (2) times a day.  (Patient not taking: Reported on 8/2/2022), Disp: 15 g, Rfl: 0    diclofenac (VOLTAREN) 1 % gel, Apply 2 g to affected area two (2) times daily as needed for Pain., Disp: 100 g, Rfl: 2    LO LOESTRIN FE 1 mg-10 mcg (24)/10 mcg (2) tab, TAKE 1 TABLET BY ORAL ROUTE ONCE DAILY, Disp: , Rfl: 4    cholecalciferol (VITAMIN D3) (1000 Units /25 mcg) tablet, Take  by mouth daily. , Disp: , Rfl:     cpap machine kit, by Does Not Apply route., Disp: , Rfl:     SUMAtriptan (IMITREX) 50 mg tablet, Take 1 Tab by mouth once as needed for Migraine (may repeat in 2 hours after initial dose) for up to 1 dose., Disp: 10 Tab, Rfl: 0    fluticasone propionate (FLONASE) 50 mcg/actuation nasal spray, nightly., Disp: , Rfl:     multivitamin (ONE A DAY) tablet, Take 1 Tab by mouth daily. , Disp: , Rfl:     fexofenadine (ALLEGRA) 180 mg tablet, Take 180 mg by mouth daily. , Disp: , Rfl:     History  Patient Care Team:  Nelly Ochoa MD as PCP - General (Family Medicine)  Nelly Ochoa MD as PCP - [facility]  Kelsey Lara MD (Endocrinology Physician)  Lisset Connelly MD as Physician (Obstetrics & Gynecology)    Past Medical History: she has a past medical history of Allergic rhinitis, Infertility, LEONIE on CPAP (4/4/2018), Reactive depression (situational), Situational stress, and Vitamin D deficiency (2/2015). Past Surgical History: she has a past surgical history that includes hx orthopaedic (2010). Family Medical History: family history includes Asthma in her brother and father; Heart Disease (age of onset: 76) in her father; Youlanda Stalling (age of onset: 40) in her brother; Obesity in her mother. Social History: she reports that she has never smoked. She has never used smokeless tobacco. She reports current alcohol use. She reports that she does not use drugs. Objective:     Physical Exam    Constitutional:       General: Not in acute distress. Appearance: Normal appearance. Not ill-appearing,  Not toxic-appearing. HENT:      Head: Normocephalic. Right Ear: External ear normal.      Left Ear: External ear normal.      Nose: Nose normal.   Eyes:      General: No scleral icterus. Right eye: No discharge. Left eye: No discharge. Extraocular Movements: Extraocular movements intact. Conjunctiva/sclera: Conjunctivae normal.   Neck:      Musculoskeletal: Normal range of motion. Pulmonary:      Effort: Pulmonary effort is normal. No respiratory distress. Neurological:      Mental Status: Mental status is at baseline. Psychiatric:         Mood and Affect: Mood normal.         Behavior: Behavior normal.         Thought Content: Thought content normal.         Judgment: Judgment normal.       Assessment & Plan:      ICD-10-CM ICD-9-CM    1. URI with cough and congestion  J06.9 465.9 guaiFENesin ER (MUCINEX) 600 mg ER tablet      benzonatate (TESSALON) 200 mg capsule        Continue symptomatic treatment. Advised repeat COVID testing. Provided work note. Discussed red flag symptoms and reasons to call or go to ED          I was in the office while conducting this encounter. Consent:  She and/or her healthcare decision maker is aware that this patient-initiated Telehealth encounter is a billable service, with coverage as determined by her insurance carrier. She is aware that she may receive a bill and has provided verbal consent to proceed: Yes    This virtual visit was conducted via [address] Pursuant to the emergency declaration under the [address] waiver authority and the [employer] (PR[employer]) and [employer] Act, this Virtual  Visit was conducted to reduce the patient's risk of exposure to COVID-19 and provide continuity of care for an established patient. Services were provided through a video synchronous discussion virtually to substitute for in-person clinic visit.   Due to this being a TeleHealth evaluation, many elements of the physical examination are unable to be assessed. Total Time: minutes: 21-30 minutes. I have discussed the diagnosis with the patient and the intended plan as seen in the above orders. The patient has received an after-visit summary along with patient information handout. I have discussed medication side effects and warnings with the patient as well. Disposition  Follow-up and Dispositions    Return if symptoms worsen or fail to improve.            Sharmin Mcmullen MD

## 2022-08-05 ENCOUNTER — VIRTUAL VISIT (OUTPATIENT)
Dept: FAMILY MEDICINE CLINIC | Age: 47
End: 2022-08-05
Payer: COMMERCIAL

## 2022-08-05 DIAGNOSIS — U07.1 COVID-19: Primary | ICD-10-CM

## 2022-08-05 PROCEDURE — 99213 OFFICE O/P EST LOW 20 MIN: CPT | Performed by: FAMILY MEDICINE

## 2022-08-05 RX ORDER — NIRMATRELVIR AND RITONAVIR 300-100 MG
KIT ORAL
Qty: 1 BOX | Refills: 0 | Status: SHIPPED | OUTPATIENT
Start: 2022-08-05

## 2022-08-05 NOTE — LETTER
NOTIFICATION RETURN TO WORK / SCHOOL    8/5/2022 1:50 PM    Ms. [address] [address]      To Whom It May Concern:    Codie Fernandes is currently under the care of GREGOR Barnes 54. Please excuse patient from work from 8/3/22 to 8/9/22. She will return to work/school on 8/10/22. She will need to wear a mask at work from 8/10/22 to 8/14/22. If there are questions or concerns please have the patient contact our office.         Sincerely,          Octavia Suh MD

## 2022-08-05 NOTE — PROGRESS NOTES
Felicita Levy  56 y.o. female  1975  [address] [facility] [address]  [id number]     [address] [facility]       Encounter Date: 8/5/2022           Established Patient Visit Note: Awilda Riley MD    Reason for Appointment:  Chief Complaint   Patient presents with    Positive For Covid-19         History of Present Illness:  History provided by patient    Felicita Levy is a 56 y.o. female who presents today for:     COVID Positive  Duration: symptoms started on 8/2/22, she tested positive on 8/4/22  Symptoms: see visit note from 8/3/22; she reports that her fever has been intermittent; the cough does not feel as deep, she continues to have fatigue, headache improved today   AS: she denies any dyspnea or chest pain  Medications: she reports that the tessalon has helped. Review of Systems  See HPI      Allergies: Patient has no known allergies. Medications:     Current Outpatient Medications:     nirmatrelvir-ritonavir (Paxlovid, EUA,) 300 mg (150 mg x 2)-100 mg tablet, Use as written on package, Disp: 1 Box, Rfl: 0    guaiFENesin ER (MUCINEX) 600 mg ER tablet, Take 1 Tablet by mouth two (2) times a day., Disp: 10 Tablet, Rfl: 1    benzonatate (TESSALON) 200 mg capsule, Take 1 Capsule by mouth three (3) times daily as needed for Cough for up to 7 days. , Disp: 15 Capsule, Rfl: 0    metFORMIN ER (GLUCOPHAGE XR) 500 mg tablet, Take 1 Tablet by mouth daily (with dinner). , Disp: 90 Tablet, Rfl: 1    FLUoxetine (PROzac) 10 mg capsule, Take 1 Capsule by mouth daily. , Disp: 90 Capsule, Rfl: 1    buPROPion XL (WELLBUTRIN XL) 150 mg tablet, Take 1 Tablet by mouth every morning., Disp: 90 Tablet, Rfl: 1    nystatin (MYCOSTATIN) topical cream, Apply  to affected area two (2) times a day.  (Patient not taking: Reported on 8/2/2022), Disp: 15 g, Rfl: 0    diclofenac (VOLTAREN) 1 % gel, Apply 2 g to affected area two (2) times daily as needed for Pain., Disp: 100 g, Rfl: 2    LO LOESTRIN FE 1 mg-10 mcg (24)/10 mcg (2) tab, TAKE 1 TABLET BY ORAL ROUTE ONCE DAILY, Disp: , Rfl: 4    cholecalciferol (VITAMIN D3) (1000 Units /25 mcg) tablet, Take  by mouth daily. , Disp: , Rfl:     cpap machine kit, by Does Not Apply route., Disp: , Rfl:     SUMAtriptan (IMITREX) 50 mg tablet, Take 1 Tab by mouth once as needed for Migraine (may repeat in 2 hours after initial dose) for up to 1 dose., Disp: 10 Tab, Rfl: 0    fluticasone propionate (FLONASE) 50 mcg/actuation nasal spray, nightly., Disp: , Rfl:     multivitamin (ONE A DAY) tablet, Take 1 Tab by mouth daily. , Disp: , Rfl:     fexofenadine (ALLEGRA) 180 mg tablet, Take 180 mg by mouth daily. , Disp: , Rfl:     History  Patient Care Team:  Cj Chavez MD as PCP - General (Family Medicine)  Cj Chavez MD as PCP - [facility]  Migel Conn MD (Endocrinology Physician)  Jose Luis Mathis MD as Physician (Obstetrics & Gynecology)    Past Medical History: she has a past medical history of Allergic rhinitis, Infertility, LEONIE on CPAP (4/4/2018), Reactive depression (situational), Situational stress, and Vitamin D deficiency (2/2015). Past Surgical History: she has a past surgical history that includes hx orthopaedic (2010). Family Medical History: family history includes Asthma in her brother and father; Heart Disease (age of onset: 76) in her father; Petty Kocher (age of onset: 40) in her brother; Obesity in her mother. Social History: she reports that she has never smoked. She has never used smokeless tobacco. She reports current alcohol use. She reports that she does not use drugs. Objective:     Physical Exam    Constitutional:       General: Not in acute distress. Appearance: Normal appearance. Not ill-appearing,  Not toxic-appearing. HENT:      Head: Normocephalic.       Right Ear: External ear normal.      Left Ear: External ear normal.      Nose: Nose normal.   Eyes:      General: No scleral icterus. Right eye: No discharge. Left eye: No discharge. Extraocular Movements: Extraocular movements intact. Conjunctiva/sclera: Conjunctivae normal.   Neck:      Musculoskeletal: Normal range of motion. Pulmonary:      Effort: Pulmonary effort is normal. No respiratory distress. Neurological:      Mental Status: Mental status is at baseline. Psychiatric:         Mood and Affect: Mood normal.         Behavior: Behavior normal.         Thought Content: Thought content normal.         Judgment: Judgment normal.       Assessment & Plan:      ICD-10-CM ICD-9-CM    1. COVID-19  U07.1 079.89 nirmatrelvir-ritonavir (Paxlovid, EUA,) 300 mg (150 mg x 2)-100 mg tablet        COVID 19: Acute, uncontrolled. Treat as above. . Continue symptomatic treatment. Discussed criteria for release from quarantine. Discussed red flag symptoms and reasons to call or go to ED. Advised holding flonase and lo loestrin    I was in the office while conducting this encounter. Consent:  She and/or her healthcare decision maker is aware that this patient-initiated Telehealth encounter is a billable service, with coverage as determined by her insurance carrier. She is aware that she may receive a bill and has provided verbal consent to proceed: Yes    This virtual visit was conducted via [address] Pursuant to the emergency declaration under the [facility] waiver authority and the [employer] Act, this Virtual  Visit was conducted to reduce the patient's risk of exposure to COVID-19 and provide continuity of care for an established patient. Services were provided through a video synchronous discussion virtually to substitute for in-person clinic visit. Due to this being a TeleHealth evaluation, many elements of the physical examination are unable to be assessed. Total Time: minutes: 21-30 minutes.         I have discussed the diagnosis with the patient and the intended plan as seen in the above orders. The patient has received an after-visit summary along with patient information handout. I have discussed medication side effects and warnings with the patient as well. Disposition  Follow-up and Dispositions    Return if symptoms worsen or fail to improve, for as previously discussed.            Jacque Irving MD

## 2022-08-08 PROBLEM — G47.33 OSA TREATED WITH BIPAP: Status: ACTIVE | Noted: 2018-04-04

## 2022-09-01 DIAGNOSIS — F32.A ANXIETY AND DEPRESSION: ICD-10-CM

## 2022-09-01 DIAGNOSIS — F41.9 ANXIETY AND DEPRESSION: ICD-10-CM

## 2022-09-01 RX ORDER — FLUOXETINE 10 MG/1
10 CAPSULE ORAL DAILY
Qty: 90 CAPSULE | Refills: 1 | Status: SHIPPED | OUTPATIENT
Start: 2022-09-01

## 2022-09-01 NOTE — TELEPHONE ENCOUNTER
Patient request for refill ASAP as she has run out. Thanks, Nuria    Last Visit: VV 8/5/22 MD Yanet Cowart  Next Appointment: 11/4/22 MD Yanet Cowart  Previous Refill Encounter(s): 3/25/22 90 + 1    Requested Prescriptions     Pending Prescriptions Disp Refills    FLUoxetine (PROzac) 10 mg capsule 90 Capsule 1     Sig: Take 1 Capsule by mouth daily. For Alfred Mccallum in place:   Recommendation Provided To:    Intervention Detail: New Rx: 1, reason: Patient Preference  Gap Closed?:   Intervention Accepted By:   Time Spent (min): 5

## 2022-09-05 DIAGNOSIS — F41.9 ANXIETY AND DEPRESSION: ICD-10-CM

## 2022-09-05 DIAGNOSIS — F32.A ANXIETY AND DEPRESSION: ICD-10-CM

## 2022-09-06 RX ORDER — BUPROPION HYDROCHLORIDE 150 MG/1
150 TABLET ORAL
Qty: 90 TABLET | Refills: 1 | Status: SHIPPED | OUTPATIENT
Start: 2022-09-06

## 2022-09-06 NOTE — TELEPHONE ENCOUNTER
Chief Complaint   Patient presents with    Medication Refill     Wellbutrin     Patient seen on 08/05/22.

## 2022-11-04 ENCOUNTER — TELEPHONE (OUTPATIENT)
Dept: FAMILY MEDICINE CLINIC | Age: 47
End: 2022-11-04

## 2022-11-04 ENCOUNTER — OFFICE VISIT (OUTPATIENT)
Dept: FAMILY MEDICINE CLINIC | Age: 47
End: 2022-11-04
Payer: COMMERCIAL

## 2022-11-04 VITALS
HEIGHT: 61 IN | WEIGHT: 214 LBS | DIASTOLIC BLOOD PRESSURE: 82 MMHG | OXYGEN SATURATION: 98 % | RESPIRATION RATE: 16 BRPM | BODY MASS INDEX: 40.4 KG/M2 | TEMPERATURE: 98.9 F | HEART RATE: 97 BPM | SYSTOLIC BLOOD PRESSURE: 136 MMHG

## 2022-11-04 DIAGNOSIS — E66.01 SEVERE OBESITY (BMI 35.0-35.9 WITH COMORBIDITY) (HCC): ICD-10-CM

## 2022-11-04 DIAGNOSIS — G43.909 MIGRAINE WITHOUT STATUS MIGRAINOSUS, NOT INTRACTABLE, UNSPECIFIED MIGRAINE TYPE: ICD-10-CM

## 2022-11-04 DIAGNOSIS — G89.29 CHRONIC MIDLINE LOW BACK PAIN WITHOUT SCIATICA: ICD-10-CM

## 2022-11-04 DIAGNOSIS — F41.9 ANXIETY AND DEPRESSION: ICD-10-CM

## 2022-11-04 DIAGNOSIS — F32.A ANXIETY AND DEPRESSION: ICD-10-CM

## 2022-11-04 DIAGNOSIS — J30.89 ENVIRONMENTAL AND SEASONAL ALLERGIES: ICD-10-CM

## 2022-11-04 DIAGNOSIS — G47.33 OSA TREATED WITH BIPAP: ICD-10-CM

## 2022-11-04 DIAGNOSIS — E78.2 MIXED HYPERLIPIDEMIA: ICD-10-CM

## 2022-11-04 DIAGNOSIS — M54.50 CHRONIC MIDLINE LOW BACK PAIN WITHOUT SCIATICA: ICD-10-CM

## 2022-11-04 DIAGNOSIS — E55.9 VITAMIN D DEFICIENCY: ICD-10-CM

## 2022-11-04 DIAGNOSIS — R73.03 PREDIABETES: Primary | ICD-10-CM

## 2022-11-04 PROCEDURE — 99214 OFFICE O/P EST MOD 30 MIN: CPT | Performed by: FAMILY MEDICINE

## 2022-11-04 NOTE — PROGRESS NOTES
Dylon Ga  56 y.o. female  1975  [address] [facility] [address]  [id number]     [address] [facility] PRACTICE       Encounter Date: 11/4/2022           Established Patient Visit Note: Beto Yung MD    Reason for Appointment:  Chief Complaint   Patient presents with    Follow-up               History of Present Illness:  History provided by patient    Dylon Ga is a 56 y.o. female who presents to clinic today for:     Obesity:  She continues the [employer]. Prior Medications: Phenteramine (gastrointestinal side effects). Orlistat (GI side effects). She reports that stress eating has returned. Prediabetes: Last A1c was 5.7 on 7/26/22  Low Back Pain: continues having good days and bad days  Mixed Hyperlipidemia: continues work on eating healthy and exercise  Anxiety and Depression: She continues Prozac and Wellbutrin. She continues counseling, and she plans to schedule follow up soon. Vitamin D deficiency: takes 1000 international units  Daily  Migranes: she reports that these have been okay  Allergies: she takes allegra and flonase. LEONIE on Bipap:followed by [facility] medicine and compliant with BiPAP        HM  CCS: she had colonoscopy with Dr. Toi Sandy in June, 2022 showing 1 polyp; repeat recommended in seven years  PAP: followed by GYN (Dr. Chelsea Rodriguez)            Review of Systems  All other ROS were reviewed and are negative except as discussed in HPI        Allergies: Patient has no known allergies. Medications:     Current Outpatient Medications:     semaglutide (OZEMPIC) 0.25 mg or 0.5 mg/dose (2 mg/1.5 ml) subq pen, 0.25 mg by SubCUTAneous route every seven (7) days. , Disp: 1 Box, Rfl: 0    buPROPion XL (WELLBUTRIN XL) 150 mg tablet, Take 1 Tablet by mouth every morning., Disp: 90 Tablet, Rfl: 1    FLUoxetine (PROzac) 10 mg capsule, Take 1 Capsule by mouth daily. , Disp: 90 Capsule, Rfl: 1    metFORMIN ER (GLUCOPHAGE XR) 500 mg tablet, Take 1 Tablet by mouth daily (with dinner). , Disp: 90 Tablet, Rfl: 1    diclofenac (VOLTAREN) 1 % gel, Apply 2 g to affected area two (2) times daily as needed for Pain., Disp: 100 g, Rfl: 2    LO LOESTRIN FE 1 mg-10 mcg (24)/10 mcg (2) tab, TAKE 1 TABLET BY ORAL ROUTE ONCE DAILY, Disp: , Rfl: 4    cholecalciferol (VITAMIN D3) (1000 Units /25 mcg) tablet, Take  by mouth daily. , Disp: , Rfl:     cpap machine kit, by Does Not Apply route., Disp: , Rfl:     fluticasone propionate (FLONASE) 50 mcg/actuation nasal spray, nightly., Disp: , Rfl:     multivitamin (ONE A DAY) tablet, Take 1 Tab by mouth daily. , Disp: , Rfl:     fexofenadine (ALLEGRA) 180 mg tablet, Take 180 mg by mouth daily. , Disp: , Rfl:     nirmatrelvir-ritonavir (Paxlovid, EUA,) 300 mg (150 mg x 2)-100 mg tablet, Use as written on package (Patient not taking: Reported on 11/4/2022), Disp: 1 Box, Rfl: 0    guaiFENesin ER (MUCINEX) 600 mg ER tablet, Take 1 Tablet by mouth two (2) times a day. (Patient not taking: Reported on 11/4/2022), Disp: 10 Tablet, Rfl: 1    nystatin (MYCOSTATIN) topical cream, Apply  to affected area two (2) times a day. (Patient not taking: No sig reported), Disp: 15 g, Rfl: 0    SUMAtriptan (IMITREX) 50 mg tablet, Take 1 Tab by mouth once as needed for Migraine (may repeat in 2 hours after initial dose) for up to 1 dose. (Patient not taking: Reported on 11/4/2022), Disp: 10 Tab, Rfl: 0    History  Patient Care Team:  Daisy Dalal MD as PCP - General (Family Medicine)  Daisy Dalal MD as PCP - [facility]  Epifanio Gaines MD (Endocrinology Physician)  Michelle Francis MD as Physician (Obstetrics & Gynecology)    Past Medical History: she has a past medical history of Allergic rhinitis, Infertility, LEONIE on CPAP (4/4/2018), Reactive depression (situational), Situational stress, and Vitamin D deficiency (2/2015). Past Surgical History: she has a past surgical history that includes hx orthopaedic (2010).     Family Medical History: family history includes Asthma in her brother and father; Heart Disease (age of onset: 76) in her father; Harvy Ast (age of onset: 40) in her brother; Obesity in her mother. Social History: she reports that she has never smoked. She has never used smokeless tobacco. She reports current alcohol use. She reports that she does not use drugs. Objective:   Visit Vitals  /82 (BP 1 Location: Left arm, BP Patient Position: Sitting, BP Cuff Size: Adult long)   Pulse 97   Temp 98.9 °F (37.2 °C) (Temporal)   Resp 16   Ht 5' 1\" (1.549 m)   Wt 214 lb (97.1 kg)   LMP 11/02/2022   SpO2 98%   BMI 40.43 kg/m²     Wt Readings from Last 3 Encounters:   11/04/22 214 lb (97.1 kg)   08/02/22 211 lb 12.8 oz (96.1 kg)   05/02/22 205 lb (93 kg)       Physical Exam  Vitals and nursing note reviewed. Constitutional:       General: She is not in acute distress. Appearance: Normal appearance. HENT:      Head: Normocephalic and atraumatic. Nose: Nose normal.      Mouth/Throat:      Mouth: Mucous membranes are moist.   Eyes:      Extraocular Movements: Extraocular movements intact. Conjunctiva/sclera: Conjunctivae normal.      Pupils: Pupils are equal, round, and reactive to light. Cardiovascular:      Rate and Rhythm: Normal rate and regular rhythm. Pulses: Normal pulses. Heart sounds: Normal heart sounds. No murmur heard. No friction rub. No gallop. Pulmonary:      Effort: Pulmonary effort is normal. No respiratory distress. Breath sounds: Normal breath sounds. No wheezing, rhonchi or rales. Musculoskeletal:         General: Normal range of motion. Cervical back: Normal range of motion and neck supple. No rigidity. No muscular tenderness. Lymphadenopathy:      Cervical: No cervical adenopathy. Skin:     General: Skin is warm. Coloration: Skin is not jaundiced. Neurological:      General: No focal deficit present. Mental Status: She is alert.  Mental status is at baseline. Motor: Motor function is intact. Coordination: Coordination is intact. Psychiatric:         Mood and Affect: Mood normal.         Behavior: Behavior normal.         Thought Content: Thought content normal.         Judgment: Judgment normal.       Assessment & Plan:      ICD-10-CM ICD-9-CM    1. Prediabetes  R73.03 790.29 semaglutide (OZEMPIC) 0.25 mg or 0.5 mg/dose (2 mg/1.5 ml) subq pen      HEMOGLOBIN A1C WITH EAG      HEMOGLOBIN A1C WITH EAG      2. Vitamin D deficiency  E55.9 268.9 VITAMIN D, 25 HYDROXY      VITAMIN D, 25 HYDROXY      3. Mixed hyperlipidemia  E78.2 272.2 CBC W/O DIFF      LIPID PANEL      METABOLIC PANEL, COMPREHENSIVE      TSH 3RD GENERATION      CBC W/O DIFF      LIPID PANEL      METABOLIC PANEL, COMPREHENSIVE      TSH 3RD GENERATION      4. Anxiety and depression  F41.9 300.00     F32. A 311       5. Severe obesity (BMI 35.0-35.9 with comorbidity) ([facility])  E66.01 278.01     Z68.35 V85.35       6. Migraine without status migrainosus, not intractable, unspecified migraine type  G43.909 346.90       7. Environmental and seasonal allergies  J30.89 477.8       8. LEONIE treated with BiPAP  G47.33 327.23       9. Chronic midline low back pain without sciatica  M54.50 724.2     G89.29 338.29         Prediabetes, Obesity: Chronic, stable but given comorbid obesity discussed additional options that might help with weight. Discussed risks, benefits, precautions, side effects. Patient would like to try Ozempic. Will start. Patient will email us in 4 weeks to let us know how she is doing. All other conditions listed above: Chronic, stable and/or managed by specialist. Will continue current treatment regimen. Will check labs as reflected above. Discussed following up with specialist as scheduled. Discussed recommendations for diet and exercise. I have discussed the diagnosis with the patient and the intended plan as seen in the above orders.   The patient has received an after-visit summary along with patient information handout. I have discussed medication side effects and warnings with the patient as well. Disposition  Follow-up and Dispositions    Return in about 3 months (around 2/4/2023) for follow up of chronic conditions.            Beto Yung MD

## 2022-11-04 NOTE — PROGRESS NOTES
Chief Complaint   Patient presents with    Follow-up           1. Have you been to the ER, urgent care clinic since your last visit? Hospitalized since your last visit? No    2. Have you seen or consulted any other health care providers outside of the [address] [place] [address] since your last visit? Include any pap smears or colon screening.  No

## 2022-11-04 NOTE — TELEPHONE ENCOUNTER
Chief Complaint   Patient presents with    Results     Colonoscopy      Faxed request sheet over dr. Keysha Holder office. Confirmation was received.

## 2022-11-04 NOTE — TELEPHONE ENCOUNTER
----- Message from Mickey Stokes MD sent at 11/4/2022  2:26 PM EDT -----  Regarding: colonoscopy report  Please obtain last colonoscopy report from Dr. Keisha Davenport for this patient.

## 2022-11-04 NOTE — LETTER
11/4/2022 2:13 PM        Dear Dr. Kerry Church     Please fax us the most recent colonoscopy result so that we may update the patient's records for continuity of care.      Our fax number: [phone]    Patient:   Bryn Began  1975                    Sincerely,      Mary Brumfield MD

## 2022-11-05 LAB
25(OH)D3+25(OH)D2 SERPL-MCNC: 37.7 NG/ML (ref 30–100)
ALBUMIN SERPL-MCNC: 4.5 G/DL (ref 3.8–4.8)
ALBUMIN/GLOB SERPL: 1.7 {RATIO} (ref 1.2–2.2)
ALP SERPL-CCNC: 64 IU/L (ref 44–121)
ALT SERPL-CCNC: 41 IU/L (ref 0–32)
AST SERPL-CCNC: 33 IU/L (ref 0–40)
BILIRUB SERPL-MCNC: <0.2 MG/DL (ref 0–1.2)
BUN SERPL-MCNC: 9 MG/DL (ref 6–24)
BUN/CREAT SERPL: 14 (ref 9–23)
CALCIUM SERPL-MCNC: 9.7 MG/DL (ref 8.7–10.2)
CHLORIDE SERPL-SCNC: 97 MMOL/L (ref 96–106)
CHOLEST SERPL-MCNC: 313 MG/DL (ref 100–199)
CO2 SERPL-SCNC: 25 MMOL/L (ref 20–29)
CREAT SERPL-MCNC: 0.63 MG/DL (ref 0.57–1)
EGFR: 111 ML/MIN/1.73
ERYTHROCYTE [DISTWIDTH] IN BLOOD BY AUTOMATED COUNT: 12.6 % (ref 11.7–15.4)
EST. AVERAGE GLUCOSE BLD GHB EST-MCNC: 117 MG/DL
GLOBULIN SER CALC-MCNC: 2.6 G/DL (ref 1.5–4.5)
GLUCOSE SERPL-MCNC: 96 MG/DL (ref 70–99)
HBA1C MFR BLD: 5.7 % (ref 4.8–5.6)
HCT VFR BLD AUTO: 41.7 % (ref 34–46.6)
HDLC SERPL-MCNC: 49 MG/DL
HGB BLD-MCNC: 13.8 G/DL (ref 11.1–15.9)
IMP & REVIEW OF LAB RESULTS: NORMAL
LDLC SERPL CALC-MCNC: 183 MG/DL (ref 0–99)
MCH RBC QN AUTO: 29.9 PG (ref 26.6–33)
MCHC RBC AUTO-ENTMCNC: 33.1 G/DL (ref 31.5–35.7)
MCV RBC AUTO: 90 FL (ref 79–97)
PLATELET # BLD AUTO: 334 X10E3/UL (ref 150–450)
POTASSIUM SERPL-SCNC: 4.3 MMOL/L (ref 3.5–5.2)
PROT SERPL-MCNC: 7.1 G/DL (ref 6–8.5)
RBC # BLD AUTO: 4.62 X10E6/UL (ref 3.77–5.28)
SODIUM SERPL-SCNC: 137 MMOL/L (ref 134–144)
TRIGL SERPL-MCNC: 405 MG/DL (ref 0–149)
TSH SERPL DL<=0.005 MIU/L-ACNC: 2.88 UIU/ML (ref 0.45–4.5)
VLDLC SERPL CALC-MCNC: 81 MG/DL (ref 5–40)
WBC # BLD AUTO: 7 X10E3/UL (ref 3.4–10.8)

## 2022-12-28 ENCOUNTER — VIRTUAL VISIT (OUTPATIENT)
Dept: SLEEP MEDICINE | Age: 47
End: 2022-12-28
Payer: COMMERCIAL

## 2022-12-28 DIAGNOSIS — G47.33 OSA (OBSTRUCTIVE SLEEP APNEA): Primary | ICD-10-CM

## 2022-12-28 PROCEDURE — 99213 OFFICE O/P EST LOW 20 MIN: CPT | Performed by: NURSE PRACTITIONER

## 2022-12-28 NOTE — PROGRESS NOTES
Called patient and discussed lab results. Discussed elevated LFT. Advised rechecking at next visit. Discussed elevated cholesterol. Discussed continuing to working on diet and exercise.

## 2022-12-28 NOTE — PROGRESS NOTES
Marilyn Cortez (: 1975) is a 56 y.o. female, established patient, seen for positive airway pressure follow-up, she was last seen by Dr. Idolina Prader on 2021, prior notes and sleep testing reviewed in detail. Home sleep test 3/2018 showed AHI of 27.5/hr with a lowest SpO2 of 87%, weight at time of study 194 pounds. Subsequent BiLevel titration 2021 showed adequate treatment at a pressure of 12/8 cmH2O.    ASSESSMENT/PLAN:    ICD-10-CM ICD-9-CM    1. LEONIE (obstructive sleep apnea)  G47.33 327.23 AMB SUPPLY ORDER      2. Adult BMI 38.0-38.9 kg/sq m  Z68.38 V85.38           AHI = 27.5(3/2018). On ResMed Auto Bi - Level :  Max IPAP 20, Min EPAP 9, PS 4 cmH2O. Set up 2021. She is adherent with PAP therapy and PAP continues to benefit patient and remains necessary for control of her sleep apnea. Follow-up and Dispositions    Return in about 1 year (around 2023) for annual follow up . Sleep Apnea -   Continue on current pressures    *  Supplies ordered - full face mask and heated tubing    Orders Placed This Encounter    AMB SUPPLY ORDER     Diagnosis: (G47.33) LEONIE (obstructive sleep apnea)  (primary encounter diagnosis)     Replacement Supplies for Positive Airway Pressure Therapy Device:   Duration of need: 99 months.  Full Face Mask 1 every 3 months.  Full Face Mask Cushion 1 per month.  Headgear 1 every 6 months.  Pos Airway pressure chin strap     Tubing with heating element 1 every 3 months.  Filter(s) Disposable 2 per month.  Filter(s) Non-Disposable 1 every 6 months. .   [address] [facility] for Humidifier (Replace) 1 every 6 months. Parviz Swain, FELICIA-BC; NPI: [id number]    Electronically signed. Date:- 22         * Counseling was provided regarding the importance of regular PAP use with emphasis on ensuring sufficient total sleep time, proper sleep hygiene, and safe driving.     * Re-enforced proper and regular cleaning for the device. We discussed the risk associated with use of cleaning devices and she will continue with use of dish soap and water as the appropriate cleaning method. * She was asked to contact our office for any problems regarding PAP therapy. 2. Encouraged continued weight management program through appropriate diet and exercise regimen as significant weight reduction has been shown to reduce severity of obstructive sleep apnea. She has been walking the dog. SUBJECTIVE/OBJECTIVE:    She  is seen today for follow up on PAP device and reports no problems using the device. The following concerns reviewed:    Drowsiness no Problems exhaling no   Snoring no Forget to put on no   Mask Comfortable yes Can't fall asleep no   Dry Mouth no Mask falls off no   Air Leaking no Frequent awakenings no       She admits that her sleep has improved on PAP therapy using full face mask and heated tubing. She reports that she is sleeping better with the bilevel device and is less tired during the day. Review of device download indicated:  Auto Level pressure: Max IPAP 20, Min EPAP 9, PS 4  cmH2O; Max Pressure: IPAP 17.4, EPAP 13.4 cmH2O;  95th percentile Pressure: IPAP 16.0, EPAP 12.0 cmH2O   95th Percentile Leak: 11.9 L/Min     % Used Days >= 4 hours: 100. Avg hours used:  7:51. Therapy Apnea Index averaged over PAP use: 0.2 /hr which reflects improved sleep breathing condition. [place] Sleepiness Score: (P) 5 and Modified F.O.S.Q. Score Total / 2: (P) 19 which reflects improved sleep quality over therapy time. CO2 25 mmol/L on labs 11/2022    Sleep Review of Systems: notable for Negative difficulty falling asleep; Positive awakenings at night; Negative early morning headaches; Negative memory problems; Negative concentration issues;  Negative chest pain; Negative shortness of breath; Negative significant joint pain at night; Negative rashes or itching; Negative heartburn; Negative significant mood issues; occasional naps     Vitals reported by patient   Patient-Reported Vitals 12/26/2022   Patient-Reported Weight 203   Patient-Reported Height -   Patient-Reported Temperature 97.9   Patient-Reported Systolic  -   Patient-Reported Diastolic -   Patient-Reported LMP 11/25/22      Calculated BMI 38    Physical Exam completed by visual and auditory observation of patient with verbal input from patient. General:   Alert, oriented, not in acute distress   Eyes:  Anicteric Sclerae; no obvious strabismus   Nose:  No obvious nasal septum deviation    Neck:   Midline trachea, no visible mass   Chest/Lungs:  Respiratory effort normal, no visualized signs of difficulty breathing or respiratory distress   CVS:  No JVD   Extremities:  No obvious rashes noted on face, neck, or hands   Neuro:  No facial asymmetry, no focal deficits; no obvious tremor    Psych:  Normal affect,  normal countenance       Marysol Linarestrong, was evaluated through a synchronous (real-time) audio-video encounter. The patient (or guardian if applicable) is aware that this is a billable service, which includes applicable co-pays. This Virtual Visit was conducted with patient's (and/or legal guardian's) consent. The visit was conducted pursuant to the emergency declaration under the [address] [facility], [address] [place] [address] authority and the [employer] General Act. Patient identification was verified, and a caregiver was present when appropriate. The patient was located at: Home: [address] [place] [id number]  The provider was located at: Home: [address] [place] [address] Marina was used to authenticate this note.     -- Alejandra Grant NP, [facility]  12/28/22

## 2022-12-28 NOTE — PATIENT INSTRUCTIONS
[address] [facility] [place]. [place], [address]  Tel.  [phone]  Fax. [address] [facility] [address]  [place], [address] [facility]  Tel.  [phone]  Fax. [phone] [address]  Tel.  [phone]  Fax. [phone]     Learning About CPAP for Sleep Apnea  What is CPAP? CPAP is a small machine that you use at home every night while you sleep. It increases air pressure in your throat to keep your airway open. When you have sleep apnea, this can help you sleep better so you feel much better. CPAP stands for \"continuous positive airway pressure. \"  The CPAP machine will have one of the following:  A mask that covers your nose and mouth  Prongs that fit into your nose  A mask that covers your nose only, the most common type. This type is called NCPAP. The N stands for \"nasal.\"  Why is it done? CPAP is usually the best treatment for obstructive sleep apnea. It is the first treatment choice and the most widely used. Your doctor may suggest CPAP if you have: Moderate to severe sleep apnea. Sleep apnea and coronary artery disease (CAD) or heart failure. How does it help? CPAP can help you have more normal sleep, so you feel less sleepy and more alert during the daytime. CPAP may help keep heart failure or other heart problems from getting worse. NCPAP may help lower your blood pressure. If you use CPAP, your bed partner may also sleep better because you are not snoring or restless. What are the side effects? Some people who use CPAP have:  A dry or stuffy nose and a sore throat. Irritated skin on the face. Sore eyes. Bloating. If you have any of these problems, work with your doctor to fix them. Here are some things you can try:  Be sure the mask or nasal prongs fit well. See if your doctor can adjust the pressure of your CPAP. If your nose is dry, try a humidifier.   If your nose is runny or stuffy, try decongestant medicine or a steroid nasal spray. If these things do not help, you might try a different type of machine. Some machines have air pressure that adjusts on its own. Others have air pressures that are different when you breathe in than when you breathe out. This may reduce discomfort caused by too much pressure in your nose. Where can you learn more? Go to AnonymAsk.be  Enter Delia Ch in the search box to learn more about \"Learning About CPAP for Sleep Apnea. \"   © 0337-2068 Healthwise, Incorporated. Care instructions adapted under license by [facility] (which disclaims liability or warranty for this information). This care instruction is for use with your licensed healthcare professional. If you have questions about a medical condition or this instruction, always ask your healthcare professional. [address] any warranty or liability for your use of this information. Content Version: 0.5.84987; Last Revised: January 11, 2010  PROPER SLEEP HYGIENE    What to avoid  Do not have drinks with caffeine, such as coffee or black tea, for 8 hours before bed. Do not smoke or use other types of tobacco near bedtime. Nicotine is a stimulant and can keep you awake. Avoid drinking alcohol late in the evening, because it can cause you to wake in the middle of the night. Do not eat a big meal close to bedtime. If you are hungry, eat a light snack. Do not drink a lot of water close to bedtime, because the need to urinate may wake you up during the night. Do not read or watch TV in bed. Use the bed only for sleeping and sexual activity. What to try  Go to bed at the same time every night, and wake up at the same time every morning. Do not take naps during the day. Keep your bedroom quiet, dark, and cool. Get regular exercise, but not within 3 to 4 hours of your bedtime. .  Sleep on a comfortable pillow and mattress.   If watching the clock makes you anxious, turn it facing away from you so you cannot see the time. If you worry when you lie down, start a worry book. Well before bedtime, write down your worries, and then set the book and your concerns aside. Try meditation or other relaxation techniques before you go to bed. If you cannot fall asleep, get up and go to another room until you feel sleepy. Do something relaxing. Repeat your bedtime routine before you go to bed again. Make your house quiet and calm about an hour before bedtime. Turn down the lights, turn off the TV, log off the computer, and turn down the volume on music. This can help you relax after a busy day. Drowsy Driving: The [employer] cites drowsiness as a causing factor in more than 409,681 police reported crashes annually, resulting in 76,000 injuries and 1,500 deaths. Other surveys suggest 55% of people polled have driven while drowsy in the past year, 23% had fallen asleep but not crashed, 3% crashed, and 2% had and accident due to drowsy driving. Who is at risk? Young Drivers: One study of drowsy driving accidents states that 55% of the drivers were under 25 years. Of those, 75% were male. Shift Workers and Travelers: People who work overnight or travel across time zones frequently are at higher risk of experiencing Circadian Rhythm Disorders. They are trying to work and function when their body is programed to sleep. Sleep Deprived: Lack of sleep has a serious impact on your ability to pay attention or focus on a task. Consistently getting less than the average of 8 hours your body needs creates partial or cumulative sleep deprivation. Untreated Sleep Disorders: Sleep Apnea, Narcolepsy, R.L.S., and other sleep disorders (untreated) prevent a person from getting enough restful sleep. This leads to excessive daytime sleepiness and increases the risk for drowsy driving accidents by up to 7 times.   Medications / Alcohol: Even over the counter medications can cause drowsiness. Medications that impair a drivers attention should have a warning label. Alcohol naturally makes you sleepy and on its own can cause accidents. Combined with excessive drowsiness its effects are amplified. Signs of Drowsy Driving:   * You don't remember driving the last few miles   * You may drift out of your maranda   * You are unable to focus and your thoughts wander   * You may yawn more often than normal   * You have difficulty keeping your eyes open / nodding off   * Missing traffic signs, speeding, or tailgating  Prevention-   Good sleep hygiene, lifestyle and behavioral choices have the most impact on drowsy driving. There is no substitute for sleep and the average person requires 8 hours nightly. If you find yourself driving drowsy, stop and sleep. Consider the sleep hygiene tips provided during your visit as well. Medication Refill Policy: Refills for all medications require 1 week advance notice. Please have your pharmacy fax a refill request. We are unable to fax, or call in \"controled substance\" medications and you will need to pick these prescriptions up from our office. [employer] Activation    Thank you for requesting access to [employer]. Please follow the instructions below to securely access and download your online medical record. [employer] allows you to send messages to your doctor, view your test results, renew your prescriptions, schedule appointments, and more. How Do I Sign Up? In your internet browser, go to [email or web address] [email or web address]. Click on the First Time User? Click Here link in the Sign In box. You will see the New Member Sign Up page. Enter your [employer] Access Code exactly as it appears below. You will not need to use this code after youve completed the sign-up process. If you do not sign up before the expiration date, you must request a new code. [employer] Access Code:  Activation code not generated  Current [employer] Status: Active (This is the date your [employer] access code will )    Enter the last four digits of your Social Security Number (xxxx) and Date of Birth (mm/dd/yyyy) as indicated and click Submit. You will be taken to the next sign-up page. Create a [employer] ID. This will be your [employer] login ID and cannot be changed, so think of one that is secure and easy to remember. Create a [employer] password. You can change your password at any time. Enter your Password Reset Question and Answer. This can be used at a later time if you forget your password. Enter your e-mail address. You will receive e-mail notification when new information is available in [address] Click Sign Up. You can now view and download portions of your medical record. Click the [employer] link to download a portable copy of your medical information. Additional Information    If you have questions, please call [phone]. Remember, [employer] is NOT to be used for urgent needs. For medical emergencies, dial 911.

## 2023-01-26 DIAGNOSIS — R73.03 PREDIABETES: ICD-10-CM

## 2023-01-26 RX ORDER — METFORMIN HYDROCHLORIDE 500 MG/1
500 TABLET, EXTENDED RELEASE ORAL
Qty: 90 TABLET | Refills: 1 | Status: SHIPPED | OUTPATIENT
Start: 2023-01-26

## 2023-01-26 NOTE — TELEPHONE ENCOUNTER
Last Visit: 11/4/22 MD Nola Jang  Next Appointment: 2/3/23 MD Nola Jang  Previous Refill Encounter(s): 8/2/22 90 + 1    Requested Prescriptions     Pending Prescriptions Disp Refills    metFORMIN ER (GLUCOPHAGE XR) 500 mg tablet 90 Tablet 1     Sig: Take 1 Tablet by mouth daily (with dinner). For Pharmacy Admin Tracking Only    Program: Medication Refill  CPA in place:   Recommendation Provided To:    Intervention Detail: New Rx: 1, reason: Patient Preference  Intervention Accepted By:   Jon Closs Closed?:   Time Spent (min): 5

## 2023-02-03 ENCOUNTER — OFFICE VISIT (OUTPATIENT)
Dept: FAMILY MEDICINE CLINIC | Age: 48
End: 2023-02-03
Payer: COMMERCIAL

## 2023-02-03 VITALS
RESPIRATION RATE: 16 BRPM | SYSTOLIC BLOOD PRESSURE: 126 MMHG | OXYGEN SATURATION: 98 % | BODY MASS INDEX: 40.78 KG/M2 | DIASTOLIC BLOOD PRESSURE: 82 MMHG | WEIGHT: 216 LBS | HEIGHT: 61 IN | TEMPERATURE: 99.1 F | HEART RATE: 86 BPM

## 2023-02-03 DIAGNOSIS — E66.01 SEVERE OBESITY (BMI 35.0-35.9 WITH COMORBIDITY) (HCC): ICD-10-CM

## 2023-02-03 DIAGNOSIS — G89.29 CHRONIC MIDLINE LOW BACK PAIN WITHOUT SCIATICA: ICD-10-CM

## 2023-02-03 DIAGNOSIS — F32.A ANXIETY AND DEPRESSION: ICD-10-CM

## 2023-02-03 DIAGNOSIS — F41.9 ANXIETY AND DEPRESSION: ICD-10-CM

## 2023-02-03 DIAGNOSIS — G47.33 OSA TREATED WITH BIPAP: ICD-10-CM

## 2023-02-03 DIAGNOSIS — E66.01 OBESITY, CLASS III, BMI 40-49.9 (MORBID OBESITY) (HCC): ICD-10-CM

## 2023-02-03 DIAGNOSIS — G43.909 MIGRAINE WITHOUT STATUS MIGRAINOSUS, NOT INTRACTABLE, UNSPECIFIED MIGRAINE TYPE: ICD-10-CM

## 2023-02-03 DIAGNOSIS — E78.2 MIXED HYPERLIPIDEMIA: ICD-10-CM

## 2023-02-03 DIAGNOSIS — M54.50 CHRONIC MIDLINE LOW BACK PAIN WITHOUT SCIATICA: ICD-10-CM

## 2023-02-03 DIAGNOSIS — R73.03 PREDIABETES: Primary | ICD-10-CM

## 2023-02-03 DIAGNOSIS — J30.89 ENVIRONMENTAL AND SEASONAL ALLERGIES: ICD-10-CM

## 2023-02-03 DIAGNOSIS — E55.9 VITAMIN D DEFICIENCY: ICD-10-CM

## 2023-02-03 NOTE — PROGRESS NOTES
Chief Complaint   Patient presents with    Follow Up Chronic Condition       1. \"Have you been to the ER, urgent care clinic since your last visit? Hospitalized since your last visit? \" No    2. \"Have you seen or consulted any other health care providers outside of the [address] [place] [address] since your last visit? \" No     3. For patients over 45: Has the patient had a colonoscopy? Yes - no Care Gap present     If the patient is female:    4. For patients over 40: Has the patient had a mammogram? Yes - no Care Gap present    5. For patients over 21: Has the patient had a pap smear? Yes - Care Gap present.  Rooming MA/LPN to request most recent results, [address] [facility]    3 most recent [place] [id number] Screens 2/3/2023   Little interest or pleasure in doing things Not at all   Feeling down, depressed, irritable, or hopeless Several days   Total Score PHQ 2 1   Trouble falling or staying asleep, or sleeping too much -   Feeling tired or having little energy -   Poor appetite, weight loss, or overeating -   Feeling bad about yourself - or that you are a failure or have let yourself or your family down -   Trouble concentrating on things such as school, work, reading, or watching TV -   Moving or speaking so slowly that other people could have noticed; or the opposite being so fidgety that others notice -   Thoughts of being better off dead, or hurting yourself in some way -   PHQ 9 Score -   How difficult have these problems made it for you to do your work, take care of your home and get along with others -     Health Maintenance Due   Topic Date Due    Hepatitis C Screening  Never done    COVID-19 Vaccine (5 - Booster for Moderna series) 11/11/2022    Cervical cancer screen  12/28/2022

## 2023-02-03 NOTE — PROGRESS NOTES
Payal Welsh  56 y.o. female  1975  [address] [facility] [id number] [address]       Encounter Date: 2/3/2023           Established Patient Visit Note: Alexandra Lares MD    Reason for Appointment:  Chief Complaint   Patient presents with    Follow Up Chronic Condition         History of Present Illness:  History provided by {Relatives - adult:5061}    Payal Welsh is a 56 y.o. female who presents to clinic today for:     Obesity:  She continues the [employer]. Prior Medications: Phenteramine (gastrointestinal side effects). Orlistat (GI side effects). She reports having fluctuating in her weight that have been frustrating. She is trying to prioritize good protein and vegetables. She is not interested in weight loss surgery at this time. She has access to nutrition at her job, ans she plans to meet with them. She is off and on with counting calories. Prediabetes: Last A1c was 5.7 on 7/26/22. Continues metformin. Ozempic prescribed at last visiti, but not covered by insurance. She would be interested in Trulicity. Low Back Pain: continues having good days and bad days  Mixed Hyperlipidemia: continues work on eating healthy and exercise. She recently started taking fish oil. Anxiety and Depression: She continues Prozac and Wellbutrin. She continues counseling. Vitamin D deficiency: takes 1000 international units  Daily  Migranes: she reports that these have been good recently. She does report having some sinus headaches on occasion. Allergies: she takes allegra and flonase.   LEONIE on Bipap:followed by [facility] and compliant with BiPAP           HM  CCS: she had colonoscopy with Dr. Jeremiah Lepe in June, 2022 showing 1 polyp; repeat recommended in seven years  PAP: followed by GYN (Dr. Felisha Medina)         Review of Systems  All other ROS were reviewed and are negative except as discussed in HPI        Allergies: Patient has no known allergies. Medications:     Current Outpatient Medications:     metFORMIN ER (GLUCOPHAGE XR) 500 mg tablet, Take 1 Tablet by mouth daily (with dinner). , Disp: 90 Tablet, Rfl: 1    buPROPion XL (WELLBUTRIN XL) 150 mg tablet, Take 1 Tablet by mouth every morning., Disp: 90 Tablet, Rfl: 1    FLUoxetine (PROzac) 10 mg capsule, Take 1 Capsule by mouth daily. , Disp: 90 Capsule, Rfl: 1    diclofenac (VOLTAREN) 1 % gel, Apply 2 g to affected area two (2) times daily as needed for Pain., Disp: 100 g, Rfl: 2    LO LOESTRIN FE 1 mg-10 mcg (24)/10 mcg (2) tab, TAKE 1 TABLET BY ORAL ROUTE ONCE DAILY, Disp: , Rfl: 4    cholecalciferol (VITAMIN D3) (1000 Units /25 mcg) tablet, Take  by mouth daily. , Disp: , Rfl:     cpap machine kit, by Does Not Apply route., Disp: , Rfl:     SUMAtriptan (IMITREX) 50 mg tablet, Take 1 Tab by mouth once as needed for Migraine (may repeat in 2 hours after initial dose) for up to 1 dose., Disp: 10 Tab, Rfl: 0    fluticasone propionate (FLONASE) 50 mcg/actuation nasal spray, nightly., Disp: , Rfl:     multivitamin (ONE A DAY) tablet, Take 1 Tab by mouth daily. , Disp: , Rfl:     fexofenadine (ALLEGRA) 180 mg tablet, Take 180 mg by mouth daily. , Disp: , Rfl:     History  Patient Care Team:  Leanne Sweeney MD as PCP - General (Family Medicine)  Leanne Sweeney MD as PCP - [facility] Provider  Ruth Ann Morfin MD (Endocrinology Physician)  Beni Taylor MD as Physician (Obstetrics & Gynecology)    Past Medical History: she has a past medical history of Allergic rhinitis, Infertility, LEONIE on CPAP (4/4/2018), Reactive depression (situational), Situational stress, and Vitamin D deficiency (2/2015). Past Surgical History: she has a past surgical history that includes hx orthopaedic (2010).     Family Medical History: family history includes Asthma in her brother and father; Heart Disease (age of onset: 76) in her father; Jasmin Pole (age of onset: 40) in her brother; Obesity in her mother. Social History: she reports that she has never smoked. She has never used smokeless tobacco. She reports current alcohol use. She reports that she does not use drugs. Objective:   Visit Vitals  /82 (BP 1 Location: Left upper arm, BP Patient Position: Sitting, BP Cuff Size: Large adult long)   Pulse 86   Temp 99.1 °F (37.3 °C)   Resp 16   Ht 5' 1\" (1.549 m)   Wt 216 lb (98 kg)   LMP 01/26/2023 (Exact Date)   SpO2 98%   BMI 40.81 kg/m²     Wt Readings from Last 3 Encounters:   02/03/23 216 lb (98 kg)   11/04/22 214 lb (97.1 kg)   08/02/22 211 lb 12.8 oz (96.1 kg)       Physical Exam    Assessment & Plan:    {No Diagnosis Found}    Start Trulicity    I have discussed the diagnosis with the patient and the intended plan as seen in the above orders. The patient has received an after-visit summary along with patient information handout. I have discussed medication side effects and warnings with the patient as well.     Disposition        Carmen Hodgson MD Motor function is intact. Coordination: Coordination is intact. Psychiatric:         Mood and Affect: Mood normal.         Behavior: Behavior normal.         Thought Content: Thought content normal.         Judgment: Judgment normal.       Assessment & Plan:      ICD-10-CM ICD-9-CM    1. Prediabetes  R73.03 790.29 dulaglutide (TRULICITY) 6.94 EA/1.2 mL sub-q pen      METABOLIC PANEL, COMPREHENSIVE      HEMOGLOBIN A1C WITH EAG      METABOLIC PANEL, COMPREHENSIVE      HEMOGLOBIN A1C WITH EAG      2. Obesity, Class III, BMI 40-49.9 (morbid obesity) ([facility])  E66.01 278.01       3. Vitamin D deficiency  E55.9 268.9       4. Mixed hyperlipidemia  E78.2 272.2       5. Anxiety and depression  F41.9 300.00     F32. A 311       6. Severe obesity (BMI 35.0-35.9 with comorbidity) ([facility])  E66.01 278.01     Z68.35 V85.35       7. Migraine without status migrainosus, not intractable, unspecified migraine type  G43.909 346.90       8. LEONIE treated with BiPAP  G47.33 327.23       9. Environmental and seasonal allergies  J30.89 477.8       10. Chronic midline low back pain without sciatica  M54.50 724.2     G89.29 338.29         Prediabetes: Chronic, uncontrolled. discussed medication options and associated risks/benefits/side effects/precautions; patient would like to try  Trulicity. RTC 12 weeks to reassess. All other conditions listed above: Chronic, stable and/or managed by specialist. Will continue current treatment regimen. Will check labs as reflected above. Discussed following up with specialist as scheduled. Discussed recommendations for diet and exercise. I have discussed the diagnosis with the patient and the intended plan as seen in the above orders. The patient has received an after-visit summary along with patient information handout. I have discussed medication side effects and warnings with the patient as well.     Disposition  Follow-up and Dispositions    Return in about 3 months (around 5/3/2023) for follow up of chronic conditions.            Gwen Bergman MD

## 2023-02-04 LAB
ALBUMIN SERPL-MCNC: 4.3 G/DL (ref 3.8–4.8)
ALBUMIN/GLOB SERPL: 1.6 {RATIO} (ref 1.2–2.2)
ALP SERPL-CCNC: 64 IU/L (ref 44–121)
ALT SERPL-CCNC: 27 IU/L (ref 0–32)
AST SERPL-CCNC: 18 IU/L (ref 0–40)
BILIRUB SERPL-MCNC: <0.2 MG/DL (ref 0–1.2)
BUN SERPL-MCNC: 9 MG/DL (ref 6–24)
BUN/CREAT SERPL: 13 (ref 9–23)
CALCIUM SERPL-MCNC: 9.6 MG/DL (ref 8.7–10.2)
CHLORIDE SERPL-SCNC: 101 MMOL/L (ref 96–106)
CO2 SERPL-SCNC: 23 MMOL/L (ref 20–29)
CREAT SERPL-MCNC: 0.68 MG/DL (ref 0.57–1)
EGFRCR SERPLBLD CKD-EPI 2021: 109 ML/MIN/1.73
EST. AVERAGE GLUCOSE BLD GHB EST-MCNC: 123 MG/DL
GLOBULIN SER CALC-MCNC: 2.7 G/DL (ref 1.5–4.5)
GLUCOSE SERPL-MCNC: 125 MG/DL (ref 70–99)
HBA1C MFR BLD: 5.9 % (ref 4.8–5.6)
POTASSIUM SERPL-SCNC: 4.3 MMOL/L (ref 3.5–5.2)
PROT SERPL-MCNC: 7 G/DL (ref 6–8.5)
SODIUM SERPL-SCNC: 139 MMOL/L (ref 134–144)

## 2023-02-06 NOTE — PROGRESS NOTES
Dear Salome Ruiz,    Your labs are stable from last check. If you have any questions, please feel free to call our office at [phone].      Calista Morris MD

## 2023-02-24 DIAGNOSIS — F41.9 ANXIETY AND DEPRESSION: ICD-10-CM

## 2023-02-24 DIAGNOSIS — F32.A ANXIETY AND DEPRESSION: ICD-10-CM

## 2023-02-24 RX ORDER — FLUOXETINE 10 MG/1
10 CAPSULE ORAL DAILY
Qty: 90 CAPSULE | Refills: 1 | Status: SHIPPED | OUTPATIENT
Start: 2023-02-24

## 2023-02-24 NOTE — TELEPHONE ENCOUNTER
Last Visit: 2/3/23 MD Linda David  Next Appointment: 5/3/23 MD Lalo Mccarthy  Previous Refill Encounter(s): 9/1/22 90 + 1    Requested Prescriptions     Pending Prescriptions Disp Refills    FLUoxetine (PROzac) 10 mg capsule 90 Capsule 1     Sig: Take 1 Capsule by mouth daily. For Pharmacy Admin Tracking Only    Program: Medication Refill  CPA in place:   Recommendation Provided To:    Intervention Detail: New Rx: 1, reason: Patient Preference  Intervention Accepted By:   Sue Allen Closed?:   Time Spent (min): 5

## 2023-03-06 DIAGNOSIS — F41.9 ANXIETY AND DEPRESSION: ICD-10-CM

## 2023-03-06 DIAGNOSIS — F32.A ANXIETY AND DEPRESSION: ICD-10-CM

## 2023-03-06 RX ORDER — BUPROPION HYDROCHLORIDE 150 MG/1
150 TABLET ORAL
Qty: 90 TABLET | Refills: 1 | Status: SHIPPED | OUTPATIENT
Start: 2023-03-06

## 2023-03-06 NOTE — TELEPHONE ENCOUNTER
Last Visit: 2/3/23 MD Deneice Oppenheim  Next Appointment: 5/3/23 Robert Tellez  Previous Refill Encounter(s): 9/6/22 90 + 1    Requested Prescriptions     Pending Prescriptions Disp Refills    buPROPion XL (WELLBUTRIN XL) 150 mg tablet 90 Tablet 1     Sig: Take 1 Tablet by mouth every morning. For Pharmacy Admin Tracking Only    Program: Medication Refill  CPA in place:   Recommendation Provided To:    Intervention Detail: New Rx: 1, reason: Patient Preference  Intervention Accepted By:   Merlyn Maloney Closed?:   Time Spent (min): 5

## 2023-05-03 ENCOUNTER — OFFICE VISIT (OUTPATIENT)
Dept: FAMILY MEDICINE CLINIC | Age: 48
End: 2023-05-03
Payer: COMMERCIAL

## 2023-05-03 VITALS
HEART RATE: 88 BPM | BODY MASS INDEX: 40.78 KG/M2 | HEIGHT: 61 IN | DIASTOLIC BLOOD PRESSURE: 80 MMHG | WEIGHT: 216 LBS | OXYGEN SATURATION: 98 % | SYSTOLIC BLOOD PRESSURE: 110 MMHG | RESPIRATION RATE: 18 BRPM

## 2023-05-03 DIAGNOSIS — Z11.59 ENCOUNTER FOR HEPATITIS C SCREENING TEST FOR LOW RISK PATIENT: ICD-10-CM

## 2023-05-03 DIAGNOSIS — G47.33 OSA TREATED WITH BIPAP: ICD-10-CM

## 2023-05-03 DIAGNOSIS — E66.01 SEVERE OBESITY (BMI 35.0-35.9 WITH COMORBIDITY) (HCC): ICD-10-CM

## 2023-05-03 DIAGNOSIS — R73.03 PREDIABETES: ICD-10-CM

## 2023-05-03 DIAGNOSIS — F32.A ANXIETY AND DEPRESSION: Primary | ICD-10-CM

## 2023-05-03 DIAGNOSIS — F41.9 ANXIETY AND DEPRESSION: Primary | ICD-10-CM

## 2023-05-03 PROCEDURE — 99215 OFFICE O/P EST HI 40 MIN: CPT | Performed by: STUDENT IN AN ORGANIZED HEALTH CARE EDUCATION/TRAINING PROGRAM

## 2023-05-04 LAB
EST. AVERAGE GLUCOSE BLD GHB EST-MCNC: 120 MG/DL
HBA1C MFR BLD: 5.8 % (ref 4.8–5.6)
HCV IGG SERPL QL IA: NON REACTIVE
TSH SERPL DL<=0.005 MIU/L-ACNC: 3.05 UIU/ML (ref 0.45–4.5)

## 2023-07-17 ENCOUNTER — TELEPHONE (OUTPATIENT)
Age: 48
End: 2023-07-17

## 2023-07-17 NOTE — TELEPHONE ENCOUNTER
----- Message from [facility]. Jenifer Angeles sent at 7/15/2023  4:02 PM EDT -----  Regarding: Trulicity marian  Contact: [phone]  Hi Dr. Lashawn Matt:  I went to [facility] to pick my refill of Trulicity (my next dose is due tomorrow Sun. 7/16) and was informed that it was going to be $600 this time. Which is odd, as I only had to pay $25 the past two times since you'd helped with the whole \"stepping up\" to the medication process with [employer]. I am concerned about having to quit cold turkey  (and don't really want to quit) but cannot afford to pay $600 a month for this treatment. Is there any way you can intercede with [employer]? Is there anything I can do to get them to work with us better? Any  advice would be very much appreciated.      Sincerely,  Erik Pearson

## 2023-07-18 NOTE — TELEPHONE ENCOUNTER
Call and spoke to patient, two ID confirmed. Writer informed patient that spoke to ugichem and she said. Get Coupon from [employer] or [employer] if they still cover the med and why did it go up so much and if they do not cover do the have a medication in the same class that they do cover. Pt verbalized understanding of information discussed w/ no further questions at this time.

## 2023-07-20 NOTE — TELEPHONE ENCOUNTER
Called, left vm for pt to return call to office. Message left for patient to call due to her concerns, for her med   Trulicity if she has got it at this time.

## 2023-07-20 NOTE — TELEPHONE ENCOUNTER
Pt called said she was unable to get the trulicity. The pharmacy could not figure out how the reason. Disregard message below  made in error.

## 2023-07-20 NOTE — TELEPHONE ENCOUNTER
Pt called said she did not get the med HEADACHE    Patient complains of headache:  Location of pain {Anatomy; location headache:11349}  Character of pain {Desc; pain character:82638}  Severity of pain {Numbers; 0-10:43859}  Accompanying symptoms {Symptoms; headache:67616}  Injury {YES/NO:93300}  How long {Blank multiple:98049}  Started having HA's {Numbers; 0-10:71200} {Time; units w/plural:11} ago    *This condition is eligible for an eVisit. If not already active, sign patient up for MyChart to improve access and communication with the provider. * pharmacy could not use the [employer] coupon.

## 2023-07-26 NOTE — TELEPHONE ENCOUNTER
Last appointment: 5/3/23 MD Sharee Arreita  Next appointment: 8/9/23 MD Sharee Arrieta  Previous refill encounter(s):   Bupropion xl 150 3/6/23 90 + 1  Fluoxetine 10mg 2/24/23 90 + 1    Requested Prescriptions     Pending Prescriptions Disp Refills    buPROPion (WELLBUTRIN XL) 150 MG extended release tablet 90 tablet 1     Sig: Take 1 tablet by mouth every morning    FLUoxetine (PROZAC) 10 MG capsule 90 capsule 1     Sig: Take 1 capsule by mouth daily     For Pharmacy Admin Tracking Only    Program: Medication Refill  CPA in place:    Recommendation Provided To:    Intervention Detail: New Rx: 2, reason: Patient Preference  Intervention Accepted By:   Joss Gonzalez Closed?:    Time Spent (min): 5

## 2023-07-27 RX ORDER — BUPROPION HYDROCHLORIDE 150 MG/1
150 TABLET ORAL EVERY MORNING
Qty: 90 TABLET | Refills: 0 | Status: SHIPPED | OUTPATIENT
Start: 2023-07-27

## 2023-07-27 RX ORDER — FLUOXETINE 10 MG/1
10 CAPSULE ORAL DAILY
Qty: 90 CAPSULE | Refills: 0 | Status: SHIPPED | OUTPATIENT
Start: 2023-07-27

## 2023-07-28 RX ORDER — METFORMIN HYDROCHLORIDE 500 MG/1
500 TABLET, EXTENDED RELEASE ORAL
Qty: 90 TABLET | Refills: 0 | Status: SHIPPED | OUTPATIENT
Start: 2023-07-28

## 2023-07-28 NOTE — TELEPHONE ENCOUNTER
Last appointment: 5/3/23 MD Cody Rodgers  Next appointment: 8/9/23 MD Cody Rodgers  Previous refill encounter(s): 1/26/23 90 + 1    Requested Prescriptions     Pending Prescriptions Disp Refills    metFORMIN (GLUCOPHAGE-XR) 500 MG extended release tablet 90 tablet 1     Sig: Take 1 tablet by mouth Daily with supper     For Pharmacy Admin Tracking Only    Program: Medication Refill  CPA in place:    Recommendation Provided To:    Intervention Detail: New Rx: 1, reason: Patient Preference  Intervention Accepted By:   Isaiah Powers Closed?:    Time Spent (min): 5

## 2023-08-08 SDOH — ECONOMIC STABILITY: FOOD INSECURITY: WITHIN THE PAST 12 MONTHS, THE FOOD YOU BOUGHT JUST DIDN'T LAST AND YOU DIDN'T HAVE MONEY TO GET MORE.: NEVER TRUE

## 2023-08-08 SDOH — ECONOMIC STABILITY: TRANSPORTATION INSECURITY
IN THE PAST 12 MONTHS, HAS LACK OF TRANSPORTATION KEPT YOU FROM MEETINGS, WORK, OR FROM GETTING THINGS NEEDED FOR DAILY LIVING?: NO

## 2023-08-08 SDOH — ECONOMIC STABILITY: INCOME INSECURITY: HOW HARD IS IT FOR YOU TO PAY FOR THE VERY BASICS LIKE FOOD, HOUSING, MEDICAL CARE, AND HEATING?: NOT VERY HARD

## 2023-08-08 SDOH — ECONOMIC STABILITY: FOOD INSECURITY: WITHIN THE PAST 12 MONTHS, YOU WORRIED THAT YOUR FOOD WOULD RUN OUT BEFORE YOU GOT MONEY TO BUY MORE.: NEVER TRUE

## 2023-08-08 SDOH — ECONOMIC STABILITY: HOUSING INSECURITY
IN THE LAST 12 MONTHS, WAS THERE A TIME WHEN YOU DID NOT HAVE A STEADY PLACE TO SLEEP OR SLEPT IN A SHELTER (INCLUDING NOW)?: NO

## 2023-08-09 ENCOUNTER — OFFICE VISIT (OUTPATIENT)
Age: 48
End: 2023-08-09
Payer: COMMERCIAL

## 2023-08-09 VITALS
HEART RATE: 93 BPM | HEIGHT: 61 IN | WEIGHT: 215 LBS | OXYGEN SATURATION: 97 % | TEMPERATURE: 98.4 F | SYSTOLIC BLOOD PRESSURE: 102 MMHG | BODY MASS INDEX: 40.59 KG/M2 | DIASTOLIC BLOOD PRESSURE: 78 MMHG | RESPIRATION RATE: 18 BRPM

## 2023-08-09 DIAGNOSIS — E66.01 SEVERE OBESITY (BMI 35.0-35.9 WITH COMORBIDITY) (HCC): ICD-10-CM

## 2023-08-09 DIAGNOSIS — F41.9 ANXIETY AND DEPRESSION: Primary | ICD-10-CM

## 2023-08-09 DIAGNOSIS — R73.03 PREDIABETES: ICD-10-CM

## 2023-08-09 DIAGNOSIS — F32.A ANXIETY AND DEPRESSION: Primary | ICD-10-CM

## 2023-08-09 PROCEDURE — 99214 OFFICE O/P EST MOD 30 MIN: CPT | Performed by: STUDENT IN AN ORGANIZED HEALTH CARE EDUCATION/TRAINING PROGRAM

## 2023-08-09 SDOH — ECONOMIC STABILITY: INCOME INSECURITY: HOW HARD IS IT FOR YOU TO PAY FOR THE VERY BASICS LIKE FOOD, HOUSING, MEDICAL CARE, AND HEATING?: NOT HARD AT ALL

## 2023-08-09 SDOH — ECONOMIC STABILITY: FOOD INSECURITY: WITHIN THE PAST 12 MONTHS, THE FOOD YOU BOUGHT JUST DIDN'T LAST AND YOU DIDN'T HAVE MONEY TO GET MORE.: NEVER TRUE

## 2023-08-09 SDOH — ECONOMIC STABILITY: FOOD INSECURITY: WITHIN THE PAST 12 MONTHS, YOU WORRIED THAT YOUR FOOD WOULD RUN OUT BEFORE YOU GOT MONEY TO BUY MORE.: NEVER TRUE

## 2023-08-09 ASSESSMENT — PATIENT HEALTH QUESTIONNAIRE - PHQ9
SUM OF ALL RESPONSES TO PHQ QUESTIONS 1-9: 5
10. IF YOU CHECKED OFF ANY PROBLEMS, HOW DIFFICULT HAVE THESE PROBLEMS MADE IT FOR YOU TO DO YOUR WORK, TAKE CARE OF THINGS AT HOME, OR GET ALONG WITH OTHER PEOPLE: 0
5. POOR APPETITE OR OVEREATING: 0
SUM OF ALL RESPONSES TO PHQ QUESTIONS 1-9: 5
9. THOUGHTS THAT YOU WOULD BE BETTER OFF DEAD, OR OF HURTING YOURSELF: 0
SUM OF ALL RESPONSES TO PHQ QUESTIONS 1-9: 5
7. TROUBLE CONCENTRATING ON THINGS, SUCH AS READING THE NEWSPAPER OR WATCHING TELEVISION: 1
6. FEELING BAD ABOUT YOURSELF - OR THAT YOU ARE A FAILURE OR HAVE LET YOURSELF OR YOUR FAMILY DOWN: 1
SUM OF ALL RESPONSES TO PHQ9 QUESTIONS 1 & 2: 1
3. TROUBLE FALLING OR STAYING ASLEEP: 1
1. LITTLE INTEREST OR PLEASURE IN DOING THINGS: 0
2. FEELING DOWN, DEPRESSED OR HOPELESS: 1
SUM OF ALL RESPONSES TO PHQ QUESTIONS 1-9: 5
8. MOVING OR SPEAKING SO SLOWLY THAT OTHER PEOPLE COULD HAVE NOTICED. OR THE OPPOSITE, BEING SO FIGETY OR RESTLESS THAT YOU HAVE BEEN MOVING AROUND A LOT MORE THAN USUAL: 0
4. FEELING TIRED OR HAVING LITTLE ENERGY: 1

## 2023-08-09 NOTE — PROGRESS NOTES
Chief Complaint   Patient presents with    Follow-up       3 month         1. \"Have you been to the ER, urgent care clinic since your last visit? Hospitalized since your last visit? \" no    2. \"Have you seen or consulted any other health care providers outside of the [address] [place] [address] since your last visit? \" no    3. For patients aged 43-73: Has the patient had a colonoscopy / FIT/ Cologuard? Yes      If the patient is female:    4. For patients aged 43-66: Has the patient had a mammogram within the past 2 years? yes -      5. For patients aged 21-65: Has the patient had a pap smear? yes    PHQ-9  8/9/2023   Little interest or pleasure in doing things 0   Little interest or pleasure in doing things -   Feeling down, depressed, or hopeless 1   Trouble falling or staying asleep, or sleeping too much 1   Trouble falling or staying asleep, or sleeping too much -   Feeling tired or having little energy 1   Feeling tired or having little energy -   Poor appetite or overeating 0   Poor appetite, weight loss, or overeating -   Feeling bad about yourself - or that you are a failure or have let yourself or your family down 1   Feeling bad about yourself - or that you are a failure or have let yourself or your family down -   Trouble concentrating on things, such as reading the newspaper or watching television 1   Trouble concentrating on things such as school, work, reading, or watching TV -   Moving or speaking so slowly that other people could have noticed.  Or the opposite - being so fidgety or restless that you have been moving around a lot more than usual 0   Moving or speaking so slowly that other people could have noticed; or the opposite being so fidgety that others notice -   Thoughts that you would be better off dead, or of hurting yourself in some way 0   Thoughts of being better off dead, or hurting yourself in some way -   PHQ-2 Score 1   Total Score PHQ 2 -   PHQ-9 Total Score 5   PHQ 9 Score -   If you
35.0-35.9 with comorbidity) ([facility])        3. Prediabetes  Hemoglobin A1C    Hemoglobin A1C          Demarco rC is an 53 y.o. female with hx of preDM, obesity, anxiety and doressio nhere today to followup for these conditions. 1. Anxiety and depression: stable on current medication regimen  - continue prozac 10m qD +wellbutrin 150mg qAM  - encouraged to est with a theapist    2. Severe obesity (BMI 35.0-35.9 with comorbidity) ([facility]): stable, persistent with preDM  - followup with insurance re trulicity as worked well for her; ask about changing to mail order pharmacy or try through [place]  - information for [address] [place] [address]; [facility] message if would like referral to either     3. Prediabetes  - continue metformin 500mg qD  - Hemoglobin A1C; Future  - Hemoglobin A1C      Patient informed to follow up:   Return in about 4 months (around 12/9/2023) for weight management, anxiety & depression. I have discussed the diagnosis with the patient and the intended plan as seen in the above orders. Patient verbalized understanding of the plan and agrees with the plan. The patient has received an after-visit summary and questions were answered concerning future plans. I have discussed medication side effects and warnings with the patient as well. Informed patient to return to the office if new symptoms arise.       Cari Gomes MD  [facility]

## 2023-08-09 NOTE — PATIENT INSTRUCTIONS
[facility] to see if they prefer you to use their mail order pharmacy; send [employer] message if you find out there is another pharmacy where it would be cheaper

## 2023-08-11 LAB — HBA1C MFR BLD: 5.9 % (ref 4.8–5.6)

## 2023-09-11 DIAGNOSIS — R73.03 PREDIABETES: Primary | ICD-10-CM

## 2023-09-11 DIAGNOSIS — E66.01 SEVERE OBESITY (BMI 35.0-35.9 WITH COMORBIDITY) (HCC): ICD-10-CM

## 2023-10-25 RX ORDER — BUPROPION HYDROCHLORIDE 150 MG/1
150 TABLET ORAL EVERY MORNING
Qty: 90 TABLET | Refills: 0 | Status: SHIPPED | OUTPATIENT
Start: 2023-10-25

## 2023-10-25 RX ORDER — METFORMIN HYDROCHLORIDE 500 MG/1
500 TABLET, EXTENDED RELEASE ORAL
Qty: 90 TABLET | Refills: 0 | Status: SHIPPED | OUTPATIENT
Start: 2023-10-25

## 2023-10-25 RX ORDER — FLUOXETINE 10 MG/1
CAPSULE ORAL DAILY
Qty: 90 CAPSULE | Refills: 0 | Status: SHIPPED | OUTPATIENT
Start: 2023-10-25

## 2023-10-25 NOTE — TELEPHONE ENCOUNTER
PCP: Leona Mo MD    Last appt: 8/9/2023       Future Appointments   Date Time Provider [address]   12/12/2023  3:40 PM Leona Mo MD [facility] AMB   1/3/2024  1:40 PM OZZIE Young - NP [facility] BS AMB       Requested Prescriptions     Pending Prescriptions Disp Refills    FLUoxetine (PROZAC) 10 MG capsule [Pharmacy Med Name: FLUOXETINE HCL 10 MG CAPSULE] 90 capsule 0     Sig: TAKE 1 CAPSULE BY MOUTH EVERY DAY    buPROPion (WELLBUTRIN XL) 150 MG extended release tablet [Pharmacy Med Name: BUPROPION HCL  MG TABLET] 90 tablet 0     Sig: TAKE 1 TABLET BY MOUTH EVERY DAY IN THE MORNING    metFORMIN (GLUCOPHAGE-XR) 500 MG extended release tablet [Pharmacy Med Name: METFORMIN HCL  MG TABLET] 90 tablet 0     Sig: TAKE 1 TABLET BY MOUTH DAILY WITH SUPPER       Prior labs and Blood pressures:  BP Readings from Last 3 Encounters:   08/09/23 102/78   05/03/23 110/80   02/03/23 126/82     Lab Results   Component Value Date/Time     02/03/2023 12:00 AM    K 4.3 02/03/2023 12:00 AM     02/03/2023 12:00 AM    CO2 23 02/03/2023 12:00 AM    BUN 9 02/03/2023 12:00 AM    GFRAA 100 11/19/2021 12:00 AM     Lab Results   Component Value Date/Time    CCD8GHVK 5.5 11/25/2019 03:58 PM     Lab Results   Component Value Date/Time    CHOL 313 11/04/2022 12:00 AM    HDL 49 11/04/2022 12:00 AM    VLDL 81 11/04/2022 12:00 AM     No results found for: \"VITD3\", \"VD3RIA\"    Lab Results   Component Value Date/Time    TSH 3.050 05/03/2023 12:00 AM

## 2023-12-12 ENCOUNTER — OFFICE VISIT (OUTPATIENT)
Age: 48
End: 2023-12-12
Payer: COMMERCIAL

## 2023-12-12 VITALS
TEMPERATURE: 97.1 F | WEIGHT: 208 LBS | DIASTOLIC BLOOD PRESSURE: 78 MMHG | OXYGEN SATURATION: 97 % | BODY MASS INDEX: 39.27 KG/M2 | HEIGHT: 61 IN | RESPIRATION RATE: 16 BRPM | SYSTOLIC BLOOD PRESSURE: 120 MMHG | HEART RATE: 90 BPM

## 2023-12-12 DIAGNOSIS — F41.9 ANXIETY AND DEPRESSION: ICD-10-CM

## 2023-12-12 DIAGNOSIS — F32.A ANXIETY AND DEPRESSION: ICD-10-CM

## 2023-12-12 DIAGNOSIS — E66.01 SEVERE OBESITY (BMI 35.0-35.9 WITH COMORBIDITY) (HCC): ICD-10-CM

## 2023-12-12 DIAGNOSIS — J01.00 ACUTE NON-RECURRENT MAXILLARY SINUSITIS: ICD-10-CM

## 2023-12-12 DIAGNOSIS — R73.03 PREDIABETES: Primary | ICD-10-CM

## 2023-12-12 PROCEDURE — 99214 OFFICE O/P EST MOD 30 MIN: CPT | Performed by: STUDENT IN AN ORGANIZED HEALTH CARE EDUCATION/TRAINING PROGRAM

## 2023-12-12 RX ORDER — BUPROPION HYDROCHLORIDE 150 MG/1
150 TABLET ORAL EVERY MORNING
Qty: 90 TABLET | Refills: 1 | Status: SHIPPED | OUTPATIENT
Start: 2023-12-12

## 2023-12-12 RX ORDER — AMOXICILLIN AND CLAVULANATE POTASSIUM 875; 125 MG/1; MG/1
1 TABLET, FILM COATED ORAL 2 TIMES DAILY
Qty: 14 TABLET | Refills: 0 | Status: SHIPPED | OUTPATIENT
Start: 2023-12-12 | End: 2023-12-19

## 2023-12-12 RX ORDER — NORETHINDRONE ACETATE AND ETHINYL ESTRADIOL, AND FERROUS FUMARATE 1MG-20(24)
1 KIT ORAL DAILY
COMMUNITY
Start: 2023-09-30 | End: 2023-12-12

## 2023-12-12 RX ORDER — METFORMIN HYDROCHLORIDE 500 MG/1
500 TABLET, EXTENDED RELEASE ORAL
Qty: 90 TABLET | Refills: 1 | Status: SHIPPED | OUTPATIENT
Start: 2023-12-12

## 2023-12-12 RX ORDER — FLUOXETINE 10 MG/1
10 CAPSULE ORAL DAILY
Qty: 90 CAPSULE | Refills: 1 | Status: SHIPPED | OUTPATIENT
Start: 2023-12-12

## 2023-12-12 ASSESSMENT — PATIENT HEALTH QUESTIONNAIRE - PHQ9
SUM OF ALL RESPONSES TO PHQ QUESTIONS 1-9: 3
1. LITTLE INTEREST OR PLEASURE IN DOING THINGS: 0
7. TROUBLE CONCENTRATING ON THINGS, SUCH AS READING THE NEWSPAPER OR WATCHING TELEVISION: 0
10. IF YOU CHECKED OFF ANY PROBLEMS, HOW DIFFICULT HAVE THESE PROBLEMS MADE IT FOR YOU TO DO YOUR WORK, TAKE CARE OF THINGS AT HOME, OR GET ALONG WITH OTHER PEOPLE: 0
4. FEELING TIRED OR HAVING LITTLE ENERGY: 1
3. TROUBLE FALLING OR STAYING ASLEEP: 1
9. THOUGHTS THAT YOU WOULD BE BETTER OFF DEAD, OR OF HURTING YOURSELF: 0
2. FEELING DOWN, DEPRESSED OR HOPELESS: 0
8. MOVING OR SPEAKING SO SLOWLY THAT OTHER PEOPLE COULD HAVE NOTICED. OR THE OPPOSITE, BEING SO FIGETY OR RESTLESS THAT YOU HAVE BEEN MOVING AROUND A LOT MORE THAN USUAL: 0
SUM OF ALL RESPONSES TO PHQ QUESTIONS 1-9: 3
SUM OF ALL RESPONSES TO PHQ9 QUESTIONS 1 & 2: 0
5. POOR APPETITE OR OVEREATING: 1
6. FEELING BAD ABOUT YOURSELF - OR THAT YOU ARE A FAILURE OR HAVE LET YOURSELF OR YOUR FAMILY DOWN: 0

## 2023-12-12 ASSESSMENT — COLUMBIA-SUICIDE SEVERITY RATING SCALE - C-SSRS
7. DID THIS OCCUR IN THE LAST THREE MONTHS: NO
5. HAVE YOU STARTED TO WORK OUT OR WORKED OUT THE DETAILS OF HOW TO KILL YOURSELF? DO YOU INTEND TO CARRY OUT THIS PLAN?: NO
4. HAVE YOU HAD THESE THOUGHTS AND HAD SOME INTENTION OF ACTING ON THEM?: NO
3. HAVE YOU BEEN THINKING ABOUT HOW YOU MIGHT KILL YOURSELF?: NO

## 2023-12-12 NOTE — PATIENT INSTRUCTIONS
Alyssia Renee  [address] [facility], [address]  [phone]

## 2023-12-12 NOTE — PROGRESS NOTES
Nelida Brewer  53 y.o. female  1975  [address] [facility] [address]  [id number]     [address] [facility]       Chief Complaint:  Chief Complaint   Patient presents with    Weight Management    URI     Source: self, the medical record     Subjective  Nelida Brewer is an 53 y.o. female who presents for [facility] for weight management. Was on trulicity in past with significant weight loss. Resent to pharmacy about 3 months ago - was $25 the first two times then went up to $570 for 3 month supply. She has not established with a nutritionist in the meantime; referred to [facility] lora and Peng. Trying to incorporate more vegetables and protein into her diet and paying attention to serving sizes. Has lost about 7lb since visit. Also on metformin. No side effects with trulicity. Last A1c 5.9% - 8/2023    Wt Readings from Last 3 Encounters:   12/12/23 94.3 kg (208 lb)   08/09/23 97.5 kg (215 lb)   05/03/23 98 kg (216 lb)       URI symptoms  Started sore throat, head congestion for the last week. Feels like there is a lump in her throat and with ear pain/pressure over the right ear. NO purulent drainage but bringing up some white/grey mucus. No body aches, no HA, no fever nor chills. Covid negative when symptoms started.  with similar symptoms. ROS  Negative except what is mentioned in HPI    Allergies - reviewed:   No Known Allergies      Medications - reviewed:   Current Outpatient Medications   Medication Sig    amoxicillin-clavulanate (AUGMENTIN) 875-125 MG per tablet Take 1 tablet by mouth 2 times daily for 7 days    Dulaglutide 0.75 MG/0.5ML SOPN Inject 0.75 mg into the skin every 7 days    buPROPion (WELLBUTRIN XL) 150 MG extended release tablet Take 1 tablet by mouth every morning    metFORMIN (GLUCOPHAGE-XR) 500 MG extended release tablet Take 1 tablet by mouth Daily with supper    FLUoxetine (PROZAC) 10 MG capsule Take 1 capsule by mouth daily    Misc.  Devices (CPAP

## 2023-12-26 ENCOUNTER — TELEPHONE (OUTPATIENT)
Age: 48
End: 2023-12-26

## 2023-12-26 NOTE — TELEPHONE ENCOUNTER
Called, left vm for pt to return call to office. Message left for patient to call due to get update on Visit to [facility] and if she needs an updated visit. Message left for patient to call for an appt. If she need one.

## 2024-01-03 ENCOUNTER — CLINICAL DOCUMENTATION (OUTPATIENT)
Age: 49
End: 2024-01-03

## 2024-01-03 ENCOUNTER — TELEMEDICINE (OUTPATIENT)
Age: 49
End: 2024-01-03
Payer: COMMERCIAL

## 2024-01-03 DIAGNOSIS — G47.33 OBSTRUCTIVE SLEEP APNEA (ADULT) (PEDIATRIC): Primary | ICD-10-CM

## 2024-01-03 PROCEDURE — 99213 OFFICE O/P EST LOW 20 MIN: CPT | Performed by: NURSE PRACTITIONER

## 2024-01-03 ASSESSMENT — SLEEP AND FATIGUE QUESTIONNAIRES
HOW LIKELY ARE YOU TO NOD OFF OR FALL ASLEEP WHEN YOU ARE A PASSENGER IN A CAR FOR AN HOUR WITHOUT A BREAK: SLIGHT CHANCE OF DOZING
HOW LIKELY ARE YOU TO NOD OFF OR FALL ASLEEP WHILE SITTING AND TALKING TO SOMEONE: WOULD NEVER DOZE
HOW LIKELY ARE YOU TO NOD OFF OR FALL ASLEEP WHILE SITTING INACTIVE IN A PUBLIC PLACE: WOULD NEVER DOZE
HAS YOUR RELATIONSHIP WITH FAMILY, FRIENDS OR WORK COLLEAGUES BEEN AFFECTED BECAUSE YOU ARE SLEEPY OR TIRED: NO
HOW LIKELY ARE YOU TO NOD OFF OR FALL ASLEEP WHILE WATCHING TV: 1
HOW LIKELY ARE YOU TO NOD OFF OR FALL ASLEEP WHILE SITTING QUIETLY AFTER LUNCH WITHOUT ALCOHOL: SLIGHT CHANCE OF DOZING
HOW LIKELY ARE YOU TO NOD OFF OR FALL ASLEEP WHILE LYING DOWN TO REST IN THE AFTERNOON WHEN CIRCUMSTANCES PERMIT: 2
DO YOU HAVE DIFFICULTY OPERATING A MOTOR VEHICLE FOR SHORT DISTANCES (LESS THAN 100 MILES) BECAUSE YOU BECOME SLEEPY: NO
HOW LIKELY ARE YOU TO NOD OFF OR FALL ASLEEP WHILE SITTING AND READING: 1
HOW LIKELY ARE YOU TO NOD OFF OR FALL ASLEEP WHILE LYING DOWN TO REST IN THE AFTERNOON WHEN CIRCUMSTANCES PERMIT: 2
DO YOU HAVE DIFFICULTY OPERATING A MOTOR VEHICLE FOR LONG DISTANCES (GREATER THAN 100 MILES) BECAUSE YOU BECOME SLEEPY: NO
HOW LIKELY ARE YOU TO NOD OFF OR FALL ASLEEP WHILE SITTING AND TALKING TO SOMEONE: 0
HOW LIKELY ARE YOU TO NOD OFF OR FALL ASLEEP WHEN YOU ARE A PASSENGER IN A CAR FOR AN HOUR WITHOUT A BREAK: 1
DO YOU GENERALLY HAVE DIFFICULTY REMEMBERING THINGS BECAUSE YOU ARE SLEEPY OR TIRED: NO
HOW LIKELY ARE YOU TO NOD OFF OR FALL ASLEEP WHILE SITTING INACTIVE IN A PUBLIC PLACE: 0
HOW LIKELY ARE YOU TO NOD OFF OR FALL ASLEEP WHILE SITTING QUIETLY AFTER LUNCH WITHOUT ALCOHOL: 1
HOW LIKELY ARE YOU TO NOD OFF OR FALL ASLEEP WHILE SITTING AND READING: 1
HOW LIKELY ARE YOU TO NOD OFF OR FALL ASLEEP IN A CAR, WHILE STOPPED FOR A FEW MINUTES IN TRAFFIC: WOULD NEVER DOZE
HOW LIKELY ARE YOU TO NOD OFF OR FALL ASLEEP WHILE WATCHING TV: 1
HOW LIKELY ARE YOU TO NOD OFF OR FALL ASLEEP WHILE LYING DOWN TO REST IN THE AFTERNOON WHEN CIRCUMSTANCES PERMIT: MODERATE CHANCE OF DOZING
HOW LIKELY ARE YOU TO NOD OFF OR FALL ASLEEP WHILE WATCHING TV: SLIGHT CHANCE OF DOZING
DO YOU HAVE DIFFICULTY BEING AS ACTIVE AS YOU WANT TO BE IN THE EVENING BECAUSE YOU ARE SLEEPY OR TIRED: YES, LITTLE
HAS YOUR MOOD BEEN AFFECTED BECAUSE YOU ARE SLEEPY OR TIRED: YES, LITTLE
HOW LIKELY ARE YOU TO NOD OFF OR FALL ASLEEP WHILE SITTING AND READING: SLIGHT CHANCE OF DOZING
HOW LIKELY ARE YOU TO NOD OFF OR FALL ASLEEP IN A CAR, WHILE STOPPED FOR A FEW MINUTES IN TRAFFIC: 0
HOW LIKELY ARE YOU TO NOD OFF OR FALL ASLEEP WHILE SITTING AND TALKING TO SOMEONE: 0
HOW LIKELY ARE YOU TO NOD OFF OR FALL ASLEEP WHILE SITTING INACTIVE IN A PUBLIC PLACE: 0
ESS TOTAL SCORE: 6
DO YOU HAVE DIFFICULTY VISITING YOUR FAMILY OR FRIENDS IN THEIR HOME BECAUSE YOU BECOME SLEEPY OR TIRED: NO
HOW LIKELY ARE YOU TO NOD OFF OR FALL ASLEEP WHEN YOU ARE A PASSENGER IN A CAR FOR AN HOUR WITHOUT A BREAK: 1
FOSQ SCORE: 18.5
DO YOU HAVE DIFFICULTY WATCHING A MOVIE OR VIDEO BECAUSE YOU BECOME SLEEPY OR TIRED: NO
DO YOU HAVE DIFFICULTY BEING AS ACTIVE AS YOU WANT TO BE IN THE MORNING BECAUSE YOU ARE SLEEPY OR TIRED: YES, LITTLE
HOW LIKELY ARE YOU TO NOD OFF OR FALL ASLEEP IN A CAR, WHILE STOPPED FOR A FEW MINUTES IN TRAFFIC: 0
HOW LIKELY ARE YOU TO NOD OFF OR FALL ASLEEP WHILE SITTING QUIETLY AFTER LUNCH WITHOUT ALCOHOL: 1
ESS TOTAL SCORE: 6
DO YOU HAVE DIFFICULTY CONCENTRATING ON THE THINGS YOU DO BECAUSE YOU ARE SLEEPY OR TIRED: NO

## 2024-01-03 NOTE — PATIENT INSTRUCTIONS
you cannot see the time.  If you worry when you lie down, start a worry book. Well before bedtime, write down your worries, and then set the book and your concerns aside.  Try meditation or other relaxation techniques before you go to bed.  If you cannot fall asleep, get up and go to another room until you feel sleepy. Do something relaxing. Repeat your bedtime routine before you go to bed again.  Make your house quiet and calm about an hour before bedtime. Turn down the lights, turn off the TV, log off the computer, and turn down the volume on music. This can help you relax after a busy day.    Drowsy Driving: The [place] National Highway Traffic Safety Administration cites drowsiness as a causing factor in more than 100,000 police reported crashes annually, resulting in 76,000 injuries and 1,500 deaths. Other surveys suggest 55% of people polled have driven while drowsy in the past year, 23% had fallen asleep but not crashed, 3% crashed, and 2% had and accident due to drowsy driving.  Who is at risk?   Young Drivers: One study of drowsy driving accidents states that 55% of the drivers were under 25 years. Of those, 75% were male.   Shift Workers and Travelers: People who work overnight or travel across time zones frequently are at higher risk of experiencing Circadian Rhythm Disorders. They are trying to work and function when their body is programed to sleep.   Sleep Deprived: Lack of sleep has a serious impact on your ability to pay attention or focus on a task. Consistently getting less than the average of 8 hours your body needs creates partial or cumulative sleep deprivation.   Untreated Sleep Disorders: Sleep Apnea, Narcolepsy, R.L.S., and other sleep disorders (untreated) prevent a person from getting enough restful sleep. This leads to excessive daytime sleepiness and increases the risk for drowsy driving accidents by up to 7 times.  Medications / Alcohol: Even over the counter medications can cause

## 2024-01-03 NOTE — PROGRESS NOTES
[id number]    Electronically signed. Date:- 01/03/24       * Counseling was provided regarding the importance of regular PAP use with emphasis on ensuring sufficient total sleep time, proper sleep hygiene, and safe driving.    * Re-enforced proper and regular cleaning for the device. We discussed the risk associated with use of cleaning devices and she will continue with use of dish soap and water as the appropriate cleaning method.    * She was asked to contact our office for any problems regarding PAP therapy.    2. Encouraged continued weight management through appropriate diet and exercise regimen as significant weight reduction has been shown to reduce severity of obstructive sleep apnea.     SUBJECTIVE/OBJECTIVE:    She  is seen today for follow up on PAP device and reports no problems using the device.   The following concerns reviewed:    Drowsiness no Problems exhaling no   Snoring no Forget to put on no   Mask Comfortable yes Can't fall asleep no   Dry Mouth no Mask falls off no   Air Leaking no Frequent awakenings no       She reports that her sleep has improved on PAP therapy using full face mask and heated tubing. She notes that she has been doing well sleeping at night and not sleepy during the day.  She does occasional get sleepy after lunch, she will usually walk which is helpful. She recently started trulicity for weight loss and is doing well. She notes she has not been getting headaches like before.     She is interested in a travel device but they do not make a bipap version, she will think about her options and plan on taking BiPAP on upcoming work travel.    Review of device download indicated:  Auto Level pressure: Max IPAP 20, Min EPAP 9, PS 4  cmH2O  Max Pressure: IPAP 18.9, EPAP 14.9 cmH2O;  95th percentile Pressure: IPAP 17.3, EPAP 13.3 cmH2O   95th Percentile Leak: 1.3 L/Min     % Used Days >= 4 hours: 100.  Avg hours used:  7:39.    Therapy Apnea Index averaged over PAP use: 0.2 /hr

## 2024-01-16 ENCOUNTER — OFFICE VISIT (OUTPATIENT)
Age: 49
End: 2024-01-16
Payer: COMMERCIAL

## 2024-01-16 VITALS
BODY MASS INDEX: 38.86 KG/M2 | SYSTOLIC BLOOD PRESSURE: 120 MMHG | RESPIRATION RATE: 18 BRPM | WEIGHT: 205.8 LBS | OXYGEN SATURATION: 97 % | HEART RATE: 96 BPM | HEIGHT: 61 IN | TEMPERATURE: 97.8 F | DIASTOLIC BLOOD PRESSURE: 78 MMHG

## 2024-01-16 DIAGNOSIS — J11.1 INFLUENZA-LIKE ILLNESS: Primary | ICD-10-CM

## 2024-01-16 DIAGNOSIS — R11.2 NAUSEA AND VOMITING, UNSPECIFIED VOMITING TYPE: ICD-10-CM

## 2024-01-16 PROCEDURE — 99214 OFFICE O/P EST MOD 30 MIN: CPT | Performed by: STUDENT IN AN ORGANIZED HEALTH CARE EDUCATION/TRAINING PROGRAM

## 2024-01-16 RX ORDER — NORETHINDRONE ACETATE AND ETHINYL ESTRADIOL, AND FERROUS FUMARATE 1MG-20(24)
1 KIT ORAL DAILY
COMMUNITY
Start: 2023-12-27

## 2024-01-16 RX ORDER — ONDANSETRON 4 MG/1
4 TABLET, FILM COATED ORAL 3 TIMES DAILY PRN
Qty: 30 TABLET | Refills: 0 | Status: SHIPPED | OUTPATIENT
Start: 2024-01-16

## 2024-01-16 ASSESSMENT — PATIENT HEALTH QUESTIONNAIRE - PHQ9
SUM OF ALL RESPONSES TO PHQ QUESTIONS 1-9: 8
4. FEELING TIRED OR HAVING LITTLE ENERGY: 3
6. FEELING BAD ABOUT YOURSELF - OR THAT YOU ARE A FAILURE OR HAVE LET YOURSELF OR YOUR FAMILY DOWN: 1
3. TROUBLE FALLING OR STAYING ASLEEP: 1
7. TROUBLE CONCENTRATING ON THINGS, SUCH AS READING THE NEWSPAPER OR WATCHING TELEVISION: 1
2. FEELING DOWN, DEPRESSED OR HOPELESS: 1
10. IF YOU CHECKED OFF ANY PROBLEMS, HOW DIFFICULT HAVE THESE PROBLEMS MADE IT FOR YOU TO DO YOUR WORK, TAKE CARE OF THINGS AT HOME, OR GET ALONG WITH OTHER PEOPLE: 1
9. THOUGHTS THAT YOU WOULD BE BETTER OFF DEAD, OR OF HURTING YOURSELF: 0
SUM OF ALL RESPONSES TO PHQ9 QUESTIONS 1 & 2: 1
SUM OF ALL RESPONSES TO PHQ QUESTIONS 1-9: 8
8. MOVING OR SPEAKING SO SLOWLY THAT OTHER PEOPLE COULD HAVE NOTICED. OR THE OPPOSITE, BEING SO FIGETY OR RESTLESS THAT YOU HAVE BEEN MOVING AROUND A LOT MORE THAN USUAL: 0
1. LITTLE INTEREST OR PLEASURE IN DOING THINGS: 0
SUM OF ALL RESPONSES TO PHQ QUESTIONS 1-9: 8
5. POOR APPETITE OR OVEREATING: 1
SUM OF ALL RESPONSES TO PHQ QUESTIONS 1-9: 8

## 2024-01-16 NOTE — PROGRESS NOTES
Chief Complaint   Patient presents with    Nausea & Vomiting     Apox 1 week ago and last night. COVID TEST: 1/16/24 NEG     \"Have you been to the ER, urgent care clinic since your last visit?  Hospitalized since your last visit?\"    YES - When: approximately 1 months ago.  Where and Why: [facility] : Sore Throat .    “Have you seen or consulted any other health care providers outside of [facility] since your last visit?”    NO        “Have you had a pap smear?”    NO                1/16/2024     1:36 PM   PHQ-9    Little interest or pleasure in doing things 0   Feeling down, depressed, or hopeless 1   Trouble falling or staying asleep, or sleeping too much 1   Feeling tired or having little energy 3   Poor appetite or overeating 1   Feeling bad about yourself - or that you are a failure or have let yourself or your family down 1   Trouble concentrating on things, such as reading the newspaper or watching television 1   Moving or speaking so slowly that other people could have noticed. Or the opposite - being so fidgety or restless that you have been moving around a lot more than usual 0   Thoughts that you would be better off dead, or of hurting yourself in some way 0   PHQ-2 Score 1   PHQ-9 Total Score 8   If you checked off any problems, how difficult have these problems made it for you to do your work, take care of things at home, or get along with other people? 1           Financial Resource Strain: Low Risk  (8/9/2023)    Overall Financial Resource Strain (CARDIA)     Difficulty of Paying Living Expenses: Not hard at all      Food Insecurity: Not on file (8/9/2023)          Health Maintenance Due   Topic Date Due    Hepatitis B vaccine (1 of 3 - 3-dose series) Never done    HIV screen  Never done    Cervical cancer screen  12/28/2022    Flu vaccine (1) 08/01/2023    COVID-19 Vaccine (5 - 2023-24 season) 09/01/2023

## 2024-01-16 NOTE — PROGRESS NOTES
Andra Balderas  48 y.o. female  1975  [address] [address]  [id number]     [facility]       Chief Complaint:  Chief Complaint   Patient presents with    Nausea & Vomiting     Apox 1 week ago and last night. COVID TEST: 1/16/24 NEG   Source: self, the medical record     Subjective  Andra Balderas is an 48 y.o. female who presents for flu like symptoms for the last week. At home COVID neg this AM. Last week felt like heartburn and had diarrhea and a few episodes of vomiting - felt like it got out of her system but then last night and then last night had vomiting and diarrhea at the same time. Having dry heaves.     She is taking truclicy currently - dose is due on Sundays      ROS  Negative except what is mentioned in HPI      Allergies - reviewed:   No Known Allergies      Medications - reviewed:   Current Outpatient Medications   Medication Sig    KAROL 24 FE 1-20 MG-MCG(24) TABS Take 1 tablet by mouth daily    ondansetron (ZOFRAN) 4 MG tablet Take 1 tablet by mouth 3 times daily as needed for Nausea or Vomiting    Dulaglutide 0.75 MG/0.5ML SOPN Inject 0.75 mg into the skin every 7 days    buPROPion (WELLBUTRIN XL) 150 MG extended release tablet Take 1 tablet by mouth every morning    metFORMIN (GLUCOPHAGE-XR) 500 MG extended release tablet Take 1 tablet by mouth Daily with supper    FLUoxetine (PROZAC) 10 MG capsule Take 1 capsule by mouth daily    Misc. Devices (CPAP MACHINE) MISC by Other route    Multiple Vitamin (MULTIVITAMIN PO) Take 1 tablet by mouth daily    vitamin D (CHOLECALCIFEROL) 25 MCG (1000 UT) TABS tablet Take by mouth daily    diclofenac sodium (VOLTAREN) 1 % GEL Apply topically 2 times daily as needed    fexofenadine (ALLEGRA) 180 MG tablet Take by mouth daily    fluticasone (FLONASE) 50 MCG/ACT nasal spray nightly.    SUMAtriptan (IMITREX) 50 MG tablet Take by mouth once as needed (Patient not taking: Reported on 1/16/2024)     No current

## 2024-07-13 DIAGNOSIS — R73.03 PREDIABETES: ICD-10-CM

## 2024-07-13 DIAGNOSIS — E66.01 SEVERE OBESITY (BMI 35.0-35.9 WITH COMORBIDITY) (HCC): ICD-10-CM

## 2024-07-16 NOTE — TELEPHONE ENCOUNTER
Pharmacy sending request for Trulicity.    Please contact patient to schedule with new provider.  (MD Leary patient).  Physical was due in May.  Thanks, Sylwia    Last appointment: 12/12/23 [facility]  Next appointment: None  Previous refill encounter(s): 12/12/23 6ml    Requested Prescriptions     Pending Prescriptions Disp Refills    dulaglutide (TRULICITY) 0.75 MG/0.5ML SOPN SC injection [Pharmacy Med Name: TRULICITY 0.75 MG/0.5 ML PEN] 2 mL 0     Sig: Inject 0.5 mLs into the skin once a week     For Pharmacy Admin Tracking Only    Program: Medication Refill  CPA in place:    Recommendation Provided To:   Intervention Detail: New Rx: 1, reason: Patient Preference  Intervention Accepted By:   Gap Closed?:    Time Spent (min): 5

## 2024-07-16 NOTE — TELEPHONE ENCOUNTER
Left pt a detailed message informing her that we had received a request for her Trulicity,and that  is no longer with the practice.pt need's to re-establish care with one of the other provider's here at [facility].

## 2024-07-17 DIAGNOSIS — F41.9 ANXIETY AND DEPRESSION: ICD-10-CM

## 2024-07-17 DIAGNOSIS — R73.03 PREDIABETES: ICD-10-CM

## 2024-07-17 DIAGNOSIS — F32.A ANXIETY AND DEPRESSION: ICD-10-CM

## 2024-07-17 NOTE — TELEPHONE ENCOUNTER
Left pt a detailed message informing her that we had received a request for her Trulicity,and that  is no longer with the practice.pt need's to re-establish care with one of the other provider's here at [facility].

## 2024-07-17 NOTE — TELEPHONE ENCOUNTER
Scheduled the patient with KISHA López in Oct. That was the 1st available for anyone. She want to know can she get her refills.    Call back at [phone]

## 2024-07-17 NOTE — TELEPHONE ENCOUNTER
Office has scheduled the patient with KISHA López in Oct. That was the 1st available for anyone. She want to know can she get her refills.     Thanks, Sylwia    Last appointment: 12/12/23 [facility] (acute 1/16/24)  Next appointment: 10/25/24    Previous refill encounter(s): 12/12/23 90+ 1 (all 3)    Requested Prescriptions     Pending Prescriptions Disp Refills    metFORMIN (GLUCOPHAGE-XR) 500 MG extended release tablet [Pharmacy Med Name: METFORMIN HCL  MG TABLET] 90 tablet 0     Sig: Take 1 tablet by mouth Daily with supper    buPROPion (WELLBUTRIN XL) 150 MG extended release tablet [Pharmacy Med Name: BUPROPION HCL  MG TABLET] 90 tablet 0     Sig: Take 1 tablet by mouth every morning    FLUoxetine (PROZAC) 10 MG capsule [Pharmacy Med Name: FLUOXETINE HCL 10 MG CAPSULE] 90 capsule 0     Sig: Take 1 capsule by mouth daily     For Pharmacy Admin Tracking Only    Program: Medication Refill  CPA in place:    Recommendation Provided To:   Intervention Detail: New Rx: 3, reason: Patient Preference  Intervention Accepted By:   Gap Closed?:    Time Spent (min): 5

## 2024-07-18 RX ORDER — BUPROPION HYDROCHLORIDE 150 MG/1
150 TABLET ORAL EVERY MORNING
Qty: 90 TABLET | Refills: 0 | Status: SHIPPED | OUTPATIENT
Start: 2024-07-18

## 2024-07-18 RX ORDER — FLUOXETINE 10 MG/1
10 CAPSULE ORAL DAILY
Qty: 90 CAPSULE | Refills: 0 | Status: SHIPPED | OUTPATIENT
Start: 2024-07-18

## 2024-07-18 RX ORDER — METFORMIN HYDROCHLORIDE 500 MG/1
500 TABLET, EXTENDED RELEASE ORAL
Qty: 90 TABLET | Refills: 0 | Status: SHIPPED | OUTPATIENT
Start: 2024-07-18

## 2024-07-18 NOTE — TELEPHONE ENCOUNTER
PCP: Mikayla Leary MD    Last appt: 1/16/2024     Future Appointments   Date Time Provider Department Center   10/25/2024  3:40 PM Charo López APRN - CNP [facility]   1/8/2025  1:40 PM [facility]       Requested Prescriptions     Pending Prescriptions Disp Refills    dulaglutide (TRULICITY) 0.75 MG/0.5ML SOPN SC injection [Pharmacy Med Name: TRULICITY 0.75 MG/0.5 ML PEN] 2 mL 0     Sig: Inject 0.5 mLs into the skin once a week       Prior labs and Blood pressures:  BP Readings from Last 3 Encounters:   01/16/24 120/78   12/12/23 120/78   08/09/23 102/78     Lab Results   Component Value Date/Time     02/03/2023 12:00 AM    K 4.3 02/03/2023 12:00 AM     02/03/2023 12:00 AM    CO2 23 02/03/2023 12:00 AM    BUN 9 02/03/2023 12:00 AM    GFRAA 100 11/19/2021 12:00 AM     Lab Results   Component Value Date/Time    XLP7HCQV 5.5 11/25/2019 03:58 PM     Lab Results   Component Value Date/Time    CHOL 313 11/04/2022 12:00 AM    HDL 49 11/04/2022 12:00 AM     11/04/2022 12:00 AM    VLDL 81 11/04/2022 12:00 AM     No results found for: \"VITD3\"    Lab Results   Component Value Date/Time    TSH 3.050 05/03/2023 12:00 AM

## 2024-07-19 RX ORDER — DULAGLUTIDE 0.75 MG/.5ML
0.75 INJECTION, SOLUTION SUBCUTANEOUS WEEKLY
Qty: 2 ML | Refills: 0 | Status: SHIPPED | OUTPATIENT
Start: 2024-07-19

## 2024-10-08 ENCOUNTER — TELEPHONE (OUTPATIENT)
Age: 49
End: 2024-10-08

## 2024-10-08 DIAGNOSIS — F32.A ANXIETY AND DEPRESSION: ICD-10-CM

## 2024-10-08 DIAGNOSIS — F41.9 ANXIETY AND DEPRESSION: ICD-10-CM

## 2024-10-08 NOTE — TELEPHONE ENCOUNTER
Last appointment: OV 12/12/23, acute 1/16/24 [facility]  Next appointment: 11/25/24 [facility] (to establish)  Previous refill encounter(s): 7/18/24 90 (both)    Requested Prescriptions     Pending Prescriptions Disp Refills    buPROPion (WELLBUTRIN XL) 150 MG extended release tablet 90 tablet 0     Sig: Take 1 tablet by mouth every morning    FLUoxetine (PROZAC) 10 MG capsule 90 capsule 0     Sig: Take 1 capsule by mouth daily     For Pharmacy Admin Tracking Only    Program: Medication Refill  CPA in place:    Recommendation Provided To:   Intervention Detail: New Rx: 2, reason: Patient Preference  Intervention Accepted By:   Gap Closed?:    Time Spent (min): 5

## 2024-10-08 NOTE — TELEPHONE ENCOUNTER
Called patient to reschedule appointment scheduled on 1/8/2025. Left voicemail to please call back to get this appointment rescheduled.

## 2024-10-09 RX ORDER — FLUOXETINE 10 MG/1
10 CAPSULE ORAL DAILY
Qty: 90 CAPSULE | Refills: 0 | Status: SHIPPED | OUTPATIENT
Start: 2024-10-09

## 2024-10-09 RX ORDER — BUPROPION HYDROCHLORIDE 150 MG/1
150 TABLET ORAL EVERY MORNING
Qty: 90 TABLET | Refills: 0 | Status: SHIPPED | OUTPATIENT
Start: 2024-10-09

## 2024-10-14 DIAGNOSIS — R73.03 PREDIABETES: ICD-10-CM

## 2024-10-14 RX ORDER — METFORMIN HCL 500 MG
500 TABLET, EXTENDED RELEASE 24 HR ORAL
Qty: 90 TABLET | Refills: 0 | Status: SHIPPED | OUTPATIENT
Start: 2024-10-14

## 2024-10-14 NOTE — TELEPHONE ENCOUNTER
Last appointment: 1/16/24 [facility]  Next appointment: 11/25/24 [facility]  Previous refill encounter(s): 7/18/24 90    Requested Prescriptions     Pending Prescriptions Disp Refills    metFORMIN (GLUCOPHAGE-XR) 500 MG extended release tablet 90 tablet 0     Sig: Take 1 tablet by mouth Daily with supper     For Pharmacy Admin Tracking Only    Program: Medication Refill  CPA in place:    Recommendation Provided To:   Intervention Detail: New Rx: 1, reason: Patient Preference  Intervention Accepted By:   Gap Closed?:    Time Spent (min): 5

## 2024-10-22 ENCOUNTER — TELEPHONE (OUTPATIENT)
Age: 49
End: 2024-10-22

## 2024-10-22 NOTE — TELEPHONE ENCOUNTER
Called patient to reschedule appointment with NPJ on 1/8/2025. LVM this appointment has been cancelled and to please call the office to reschedule.

## 2024-10-30 ENCOUNTER — TELEPHONE (OUTPATIENT)
Age: 49
End: 2024-10-30

## 2024-10-30 NOTE — TELEPHONE ENCOUNTER
Patient left voicemail with office on 10/24/2024 to reschedule appointment. Returned patients call and left voicemail to call office to schedule.

## 2024-11-25 ENCOUNTER — OFFICE VISIT (OUTPATIENT)
Age: 49
End: 2024-11-25
Payer: COMMERCIAL

## 2024-11-25 VITALS
TEMPERATURE: 98 F | SYSTOLIC BLOOD PRESSURE: 128 MMHG | HEART RATE: 79 BPM | OXYGEN SATURATION: 98 % | HEIGHT: 61 IN | DIASTOLIC BLOOD PRESSURE: 85 MMHG | RESPIRATION RATE: 12 BRPM | BODY MASS INDEX: 39.53 KG/M2 | WEIGHT: 209.4 LBS

## 2024-11-25 DIAGNOSIS — F32.A ANXIETY AND DEPRESSION: ICD-10-CM

## 2024-11-25 DIAGNOSIS — R73.03 PREDIABETES: ICD-10-CM

## 2024-11-25 DIAGNOSIS — G47.33 OSA (OBSTRUCTIVE SLEEP APNEA): ICD-10-CM

## 2024-11-25 DIAGNOSIS — E78.2 MIXED HYPERLIPIDEMIA: ICD-10-CM

## 2024-11-25 DIAGNOSIS — Z76.89 ENCOUNTER TO ESTABLISH CARE: Primary | ICD-10-CM

## 2024-11-25 DIAGNOSIS — G43.009 MIGRAINE WITHOUT AURA AND WITHOUT STATUS MIGRAINOSUS, NOT INTRACTABLE: ICD-10-CM

## 2024-11-25 DIAGNOSIS — E66.01 MORBID (SEVERE) OBESITY DUE TO EXCESS CALORIES: ICD-10-CM

## 2024-11-25 DIAGNOSIS — E03.8 SUBCLINICAL HYPOTHYROIDISM: ICD-10-CM

## 2024-11-25 DIAGNOSIS — F41.9 ANXIETY AND DEPRESSION: ICD-10-CM

## 2024-11-25 PROCEDURE — 99214 OFFICE O/P EST MOD 30 MIN: CPT | Performed by: NURSE PRACTITIONER

## 2024-11-25 RX ORDER — NORETHINDRONE ACETATE AND ETHINYL ESTRADIOL AND FERROUS FUMARATE 1.5-30(21)
1 KIT ORAL DAILY
COMMUNITY
Start: 2024-09-05

## 2024-11-25 RX ORDER — METFORMIN HYDROCHLORIDE 500 MG/1
500 TABLET, EXTENDED RELEASE ORAL
Qty: 90 TABLET | Refills: 1 | Status: SHIPPED | OUTPATIENT
Start: 2024-11-25

## 2024-11-25 RX ORDER — FLUOXETINE 10 MG/1
10 CAPSULE ORAL DAILY
Qty: 90 CAPSULE | Refills: 1 | Status: SHIPPED | OUTPATIENT
Start: 2024-11-25

## 2024-11-25 RX ORDER — BUPROPION HYDROCHLORIDE 150 MG/1
150 TABLET ORAL EVERY MORNING
Qty: 90 TABLET | Refills: 1 | Status: SHIPPED | OUTPATIENT
Start: 2024-11-25

## 2024-11-25 SDOH — HEALTH STABILITY: PHYSICAL HEALTH: ON AVERAGE, HOW MANY MINUTES DO YOU ENGAGE IN EXERCISE AT THIS LEVEL?: 30 MIN

## 2024-11-25 SDOH — ECONOMIC STABILITY: INCOME INSECURITY: HOW HARD IS IT FOR YOU TO PAY FOR THE VERY BASICS LIKE FOOD, HOUSING, MEDICAL CARE, AND HEATING?: NOT HARD AT ALL

## 2024-11-25 SDOH — ECONOMIC STABILITY: FOOD INSECURITY: WITHIN THE PAST 12 MONTHS, YOU WORRIED THAT YOUR FOOD WOULD RUN OUT BEFORE YOU GOT MONEY TO BUY MORE.: NEVER TRUE

## 2024-11-25 SDOH — ECONOMIC STABILITY: FOOD INSECURITY: WITHIN THE PAST 12 MONTHS, THE FOOD YOU BOUGHT JUST DIDN'T LAST AND YOU DIDN'T HAVE MONEY TO GET MORE.: NEVER TRUE

## 2024-11-25 SDOH — HEALTH STABILITY: PHYSICAL HEALTH: ON AVERAGE, HOW MANY DAYS PER WEEK DO YOU ENGAGE IN MODERATE TO STRENUOUS EXERCISE (LIKE A BRISK WALK)?: 4 DAYS

## 2024-11-25 ASSESSMENT — PATIENT HEALTH QUESTIONNAIRE - PHQ9
SUM OF ALL RESPONSES TO PHQ QUESTIONS 1-9: 6
SUM OF ALL RESPONSES TO PHQ QUESTIONS 1-9: 6
7. TROUBLE CONCENTRATING ON THINGS, SUCH AS READING THE NEWSPAPER OR WATCHING TELEVISION: SEVERAL DAYS
2. FEELING DOWN, DEPRESSED OR HOPELESS: SEVERAL DAYS
1. LITTLE INTEREST OR PLEASURE IN DOING THINGS: SEVERAL DAYS
9. THOUGHTS THAT YOU WOULD BE BETTER OFF DEAD, OR OF HURTING YOURSELF: NOT AT ALL
SUM OF ALL RESPONSES TO PHQ QUESTIONS 1-9: 6
4. FEELING TIRED OR HAVING LITTLE ENERGY: SEVERAL DAYS
8. MOVING OR SPEAKING SO SLOWLY THAT OTHER PEOPLE COULD HAVE NOTICED. OR THE OPPOSITE, BEING SO FIGETY OR RESTLESS THAT YOU HAVE BEEN MOVING AROUND A LOT MORE THAN USUAL: NOT AT ALL
6. FEELING BAD ABOUT YOURSELF - OR THAT YOU ARE A FAILURE OR HAVE LET YOURSELF OR YOUR FAMILY DOWN: SEVERAL DAYS
3. TROUBLE FALLING OR STAYING ASLEEP: NOT AT ALL
SUM OF ALL RESPONSES TO PHQ QUESTIONS 1-9: 6
SUM OF ALL RESPONSES TO PHQ9 QUESTIONS 1 & 2: 2
10. IF YOU CHECKED OFF ANY PROBLEMS, HOW DIFFICULT HAVE THESE PROBLEMS MADE IT FOR YOU TO DO YOUR WORK, TAKE CARE OF THINGS AT HOME, OR GET ALONG WITH OTHER PEOPLE: SOMEWHAT DIFFICULT
5. POOR APPETITE OR OVEREATING: SEVERAL DAYS

## 2024-11-25 NOTE — PROGRESS NOTES
[facility]  Clinic Note     Andra Balderas (: 1975) is a 49 y.o. female, established patient, here for evaluation of the following chief complaint(s):  Establish Care       ASSESSMENT/PLAN:    1. Encounter to establish care    2. Prediabetes  -     Comprehensive Metabolic Panel; Future  -     Hemoglobin A1C; Future  -     metFORMIN (GLUCOPHAGE-XR) 500 MG extended release tablet; Take 1 tablet by mouth Daily with supper, Disp-90 tablet, R-1Normal  - Reviewed trends: 5.9, 5.8 & 5.9    3. Anxiety and depression  - Stable  -     Comprehensive Metabolic Panel; Future  -     buPROPion (WELLBUTRIN XL) 150 MG extended release tablet; Take 1 tablet by mouth every morning, Disp-90 tablet, R-1Normal  -     FLUoxetine (PROZAC) 10 MG capsule; Take 1 capsule by mouth daily, Disp-90 capsule, R-1Normal    4. Mixed hyperlipidemia  -     Comprehensive Metabolic Panel; Future  -     Lipid Panel; Future  Lab Results   Component Value Date    CHOL 313 (H) 2022    TRIG 405 (H) 2022    HDL 49 2022     (H) 2022    VLDL 81 (H) 2022         5. Migraine without aura and without status migrainosus, not intractable  - Stable    6. Subclinical hypothyroidism  -     TSH + Free T4 Panel; Future  - TSH has been up to 6.40. Last TSH = 3.05 on 5/3/23    7. Morbid (severe) obesity due to excess calories  -Education provided regarding weight management and risk factors related to obesity  -Reevaluate current barriers to weight loss, as long well as realistic Long and short-term weight loss goals  -Track food and water intake  -Benefit of regular exercise discussed including options for increased physical activity.  Recommended 150 minutes a week of moderate intensity exercise.     2023   Weight Loss Metrics       Height 5' 1\"  5' 1\"  5' 1\"  5' 1\"    Weight - Scale 215 lbs  208 lbs  205 lbs 13 oz  209 lbs 6 oz    BMI (Calculated) 40.7 kg/m2  
of Food in the Last Year: Never true          Health Maintenance Due   Topic Date Due    HIV screen  Never done    Hepatitis B vaccine (1 of 3 - 19+ 3-dose series) Never done    Cervical cancer screen  12/28/2022    Flu vaccine (1) 08/01/2024    A1C test (Diabetic or Prediabetic)  08/09/2024    COVID-19 Vaccine (5 - 2023-24 season) 09/01/2024    Breast cancer screen  09/22/2024

## 2024-11-26 LAB
ALBUMIN SERPL-MCNC: 4.3 G/DL (ref 3.9–4.9)
ALP SERPL-CCNC: 57 IU/L (ref 44–121)
ALT SERPL-CCNC: 21 IU/L (ref 0–32)
AST SERPL-CCNC: 20 IU/L (ref 0–40)
BILIRUB SERPL-MCNC: <0.2 MG/DL (ref 0–1.2)
BUN SERPL-MCNC: 12 MG/DL (ref 6–24)
BUN/CREAT SERPL: 16 (ref 9–23)
CALCIUM SERPL-MCNC: 9.2 MG/DL (ref 8.7–10.2)
CHLORIDE SERPL-SCNC: 101 MMOL/L (ref 96–106)
CHOLEST SERPL-MCNC: 237 MG/DL (ref 100–199)
CO2 SERPL-SCNC: 21 MMOL/L (ref 20–29)
CREAT SERPL-MCNC: 0.73 MG/DL (ref 0.57–1)
EGFRCR SERPLBLD CKD-EPI 2021: 101 ML/MIN/1.73
GLOBULIN SER CALC-MCNC: 2.2 G/DL (ref 1.5–4.5)
GLUCOSE SERPL-MCNC: 86 MG/DL (ref 70–99)
HBA1C MFR BLD: 6 % (ref 4.8–5.6)
HDLC SERPL-MCNC: 41 MG/DL
IMP & REVIEW OF LAB RESULTS: NORMAL
LDLC SERPL CALC-MCNC: 134 MG/DL (ref 0–99)
POTASSIUM SERPL-SCNC: 4.2 MMOL/L (ref 3.5–5.2)
PROT SERPL-MCNC: 6.5 G/DL (ref 6–8.5)
SODIUM SERPL-SCNC: 138 MMOL/L (ref 134–144)
T4 FREE SERPL-MCNC: 0.96 NG/DL (ref 0.82–1.77)
TRIGL SERPL-MCNC: 344 MG/DL (ref 0–149)
TSH SERPL DL<=0.005 MIU/L-ACNC: 2.5 UIU/ML (ref 0.45–4.5)
VLDLC SERPL CALC-MCNC: 62 MG/DL (ref 5–40)

## 2024-11-26 RX ORDER — ATORVASTATIN CALCIUM 20 MG/1
20 TABLET, FILM COATED ORAL DAILY
Qty: 90 TABLET | Refills: 1 | Status: SHIPPED | OUTPATIENT
Start: 2024-11-26

## 2024-12-17 ENCOUNTER — OFFICE VISIT (OUTPATIENT)
Age: 49
End: 2024-12-17

## 2024-12-17 VITALS
BODY MASS INDEX: 39.84 KG/M2 | HEIGHT: 61 IN | SYSTOLIC BLOOD PRESSURE: 130 MMHG | RESPIRATION RATE: 16 BRPM | OXYGEN SATURATION: 98 % | DIASTOLIC BLOOD PRESSURE: 85 MMHG | TEMPERATURE: 98.5 F | WEIGHT: 211 LBS | HEART RATE: 78 BPM

## 2024-12-17 DIAGNOSIS — J01.90 ACUTE BACTERIAL SINUSITIS: Primary | ICD-10-CM

## 2024-12-17 DIAGNOSIS — B96.89 ACUTE BACTERIAL SINUSITIS: Primary | ICD-10-CM

## 2024-12-17 NOTE — PATIENT INSTRUCTIONS
Thank you for visiting [facility] today    Acute bacterial sinusitis -  Augmentin, twice daily for 10 days  Recommend a decongestant (Sudafed)  Continue daily Flonase  Acetaminophen or ibuprofen as needed for headache/fever  Drink plenty fluids  Call or return to clinic if any concerns  Please see below for additional symptomatic recommendations:    Nasal Congestion:  Flonase or Nasonex (over the counter) nasal spray, once a day  Normal saline nasal spray  Afrin nasal spray no longer than three days  Decongestants such as Sudafed/pseudoephedrine, though if you have high blood pressure, take Coricidin HBP (or the generic form) instead. Follow instructions on the box  Cough:  Throat lozenges, hot tea, and honey may help  Vicks VapoRub at night to help with cough and relieve muscles aches and pain  If not prescribed a cough medication, Robitussin DM is an option. It is an over the counter cough medication containing dextromethorphan to help suppress cough at night   *Please only take when absolutely needed, as this is a controlled substance that can cause addiction   *Please only take cough syrup at nighttime as it causes drowsiness   *Do not drive or operate any machinery while taking this medication  Chest Congestion:  For thick mucus, take Mucinex (with Guafenesin only) to help thin the mucus. Follow instructions on the box. You will need to drink plenty of water with this medication  Sore Throat:  Lozenges, as needed. Cepacol lozenges will help numb the throat  Chloraseptic spray also helps to numb throat pain  Salt water gargles (1/4 tsp salt in 8 oz of water) to soothe throat pain  Sinus pain/pressure:  Warm, wet towel on face to help with facial sinus pain/pressure  Headache/Pain Fever/Body Aches:  If you can take NSAIDs, take Ibuprofen 400-600 mg every 8 hours as needed  If you cannot take NSAIDs, take Tylenol 325-500 mg every 6 hours as needed  Miscellanous:  Zyrtec/Xyzal/Allegra/Claritin during

## 2024-12-17 NOTE — PROGRESS NOTES
Andra Balderas (:  1975) is a 49 y.o. female,New patient, here for evaluation of the following chief complaint(s):  Pharyngitis (Patient presents for sore throat, cough and stuffy nose x 2 weeks. Patient reports symptoms continue to worsen. )        SUBJECTIVE/OBJECTIVE:    History provided by:  Patient  Pharyngitis       49 y.o. female presents with symptoms of sore throat, nasal congestion, sinus pressure and pain for the last two weeks. Last night started feeling achy all over and the sore throat is worsening.  No fever.  Sinuses very painful.  Little cough from throat irritation.  Not much post-nasal drip.    Taking a daily antihistamine and using Flonase.    No history of hypertension.         Vitals:    24 1000   BP: 130/85   Site: Left Upper Arm   Position: Sitting   Cuff Size: Medium Adult   Pulse: 78   Resp: 16   Temp: 98.5 °F (36.9 °C)   TempSrc: Oral   SpO2: 98%   Weight: 95.7 kg (211 lb)   Height: 1.55 m (5' 1.02\")       No results found for this visit on 24.     Physical Exam  Constitutional:       General: She is not in acute distress.     Appearance: Normal appearance. She is not ill-appearing or toxic-appearing.   HENT:      Head: Normocephalic and atraumatic.      Right Ear: Tympanic membrane, ear canal and external ear normal.      Left Ear: Tympanic membrane, ear canal and external ear normal.      Nose: Congestion present.      Right Sinus: Maxillary sinus tenderness and frontal sinus tenderness present.      Left Sinus: Maxillary sinus tenderness and frontal sinus tenderness present.      Comments: Frontal tenderness > maxillary tenderness.     Mouth/Throat:      Mouth: Mucous membranes are moist.      Pharynx: Posterior oropharyngeal erythema present. No oropharyngeal exudate.   Eyes:      General:         Right eye: No discharge.         Left eye: No discharge.      Conjunctiva/sclera: Conjunctivae normal.   Cardiovascular:      Rate and Rhythm: Normal rate and

## 2025-01-20 ENCOUNTER — TELEPHONE (OUTPATIENT)
Age: 50
End: 2025-01-20

## 2025-01-28 ENCOUNTER — TELEPHONE (OUTPATIENT)
Age: 50
End: 2025-01-28

## 2025-03-10 ASSESSMENT — SLEEP AND FATIGUE QUESTIONNAIRES
HOW LIKELY ARE YOU TO NOD OFF OR FALL ASLEEP IN A CAR, WHILE STOPPED FOR A FEW MINUTES IN TRAFFIC: WOULD NEVER DOZE
ESS TOTAL SCORE: 6
HOW LIKELY ARE YOU TO NOD OFF OR FALL ASLEEP WHILE SITTING INACTIVE IN A PUBLIC PLACE: WOULD NEVER DOZE
HAS YOUR MOOD BEEN AFFECTED BECAUSE YOU ARE SLEEPY OR TIRED: YES, LITTLE
DO YOU HAVE DIFFICULTY BEING AS ACTIVE AS YOU WANT TO BE IN THE EVENING BECAUSE YOU ARE SLEEPY OR TIRED: YES, LITTLE
HOW LIKELY ARE YOU TO NOD OFF OR FALL ASLEEP WHEN YOU ARE A PASSENGER IN A CAR FOR AN HOUR WITHOUT A BREAK: MODERATE CHANCE OF DOZING
HOW LIKELY ARE YOU TO NOD OFF OR FALL ASLEEP WHILE WATCHING TV: SLIGHT CHANCE OF DOZING
DO YOU HAVE DIFFICULTY OPERATING A MOTOR VEHICLE FOR LONG DISTANCES (GREATER THAN 100 MILES) BECAUSE YOU BECOME SLEEPY: NO
HOW LIKELY ARE YOU TO NOD OFF OR FALL ASLEEP WHILE SITTING AND TALKING TO SOMEONE: WOULD NEVER DOZE
DO YOU HAVE DIFFICULTY BEING AS ACTIVE AS YOU WANT TO BE IN THE MORNING BECAUSE YOU ARE SLEEPY OR TIRED: YES, LITTLE
HOW LIKELY ARE YOU TO NOD OFF OR FALL ASLEEP WHILE SITTING AND TALKING TO SOMEONE: WOULD NEVER DOZE
HOW LIKELY ARE YOU TO NOD OFF OR FALL ASLEEP WHILE SITTING QUIETLY AFTER LUNCH WITHOUT ALCOHOL: SLIGHT CHANCE OF DOZING
HAS YOUR RELATIONSHIP WITH FAMILY, FRIENDS OR WORK COLLEAGUES BEEN AFFECTED BECAUSE YOU ARE SLEEPY OR TIRED: NO
HOW LIKELY ARE YOU TO NOD OFF OR FALL ASLEEP WHILE LYING DOWN TO REST IN THE AFTERNOON WHEN CIRCUMSTANCES PERMIT: SLIGHT CHANCE OF DOZING
HOW LIKELY ARE YOU TO NOD OFF OR FALL ASLEEP WHILE SITTING QUIETLY AFTER LUNCH WITHOUT ALCOHOL: SLIGHT CHANCE OF DOZING
HOW LIKELY ARE YOU TO NOD OFF OR FALL ASLEEP WHILE LYING DOWN TO REST IN THE AFTERNOON WHEN CIRCUMSTANCES PERMIT: SLIGHT CHANCE OF DOZING
FOSQ SCORE: 18
HOW LIKELY ARE YOU TO NOD OFF OR FALL ASLEEP WHILE SITTING AND READING: SLIGHT CHANCE OF DOZING
HOW LIKELY ARE YOU TO NOD OFF OR FALL ASLEEP WHILE WATCHING TV: SLIGHT CHANCE OF DOZING
DO YOU GENERALLY HAVE DIFFICULTY REMEMBERING THINGS BECAUSE YOU ARE SLEEPY OR TIRED: NO
DO YOU HAVE DIFFICULTY VISITING YOUR FAMILY OR FRIENDS IN THEIR HOME BECAUSE YOU BECOME SLEEPY OR TIRED: NO
DO YOU HAVE DIFFICULTY CONCENTRATING ON THE THINGS YOU DO BECAUSE YOU ARE SLEEPY OR TIRED: YES, A LITTLE
DO YOU HAVE DIFFICULTY WATCHING A MOVIE OR VIDEO BECAUSE YOU BECOME SLEEPY OR TIRED: NO
HOW LIKELY ARE YOU TO NOD OFF OR FALL ASLEEP WHILE SITTING AND READING: SLIGHT CHANCE OF DOZING
DO YOU HAVE DIFFICULTY OPERATING A MOTOR VEHICLE FOR SHORT DISTANCES (LESS THAN 100 MILES) BECAUSE YOU BECOME SLEEPY: NO
HOW LIKELY ARE YOU TO NOD OFF OR FALL ASLEEP IN A CAR, WHILE STOPPED FOR A FEW MINUTES IN TRAFFIC: WOULD NEVER DOZE
HOW LIKELY ARE YOU TO NOD OFF OR FALL ASLEEP WHEN YOU ARE A PASSENGER IN A CAR FOR AN HOUR WITHOUT A BREAK: MODERATE CHANCE OF DOZING
HOW LIKELY ARE YOU TO NOD OFF OR FALL ASLEEP WHILE SITTING INACTIVE IN A PUBLIC PLACE: WOULD NEVER DOZE

## 2025-03-11 ENCOUNTER — TELEMEDICINE (OUTPATIENT)
Age: 50
End: 2025-03-11
Payer: COMMERCIAL

## 2025-03-11 DIAGNOSIS — F32.A ANXIETY AND DEPRESSION: ICD-10-CM

## 2025-03-11 DIAGNOSIS — G47.33 OSA (OBSTRUCTIVE SLEEP APNEA): Primary | ICD-10-CM

## 2025-03-11 DIAGNOSIS — F41.9 ANXIETY AND DEPRESSION: ICD-10-CM

## 2025-03-11 PROCEDURE — 99213 OFFICE O/P EST LOW 20 MIN: CPT | Performed by: INTERNAL MEDICINE

## 2025-03-11 NOTE — PROGRESS NOTES
[address], [place] [address]  Tel.  [phone]    Fax. [phone]     [address], [place] [address]  Tel.  [phone]    Fax. [phone] [address] [place] [address]  Tel.  [phone]    Fax. [phone]       Andra Balderas (: 1975) is a 49 y.o. female, established patient, seen for positive airway pressure follow-up and evaluation of the following chief complaint(s):   Sleep Problem ([place]ent [place] [facility] _send link to [phone]_yearly follow up appt (orig. w/ Dr. HALEY))       Andra Balderas was last seen by Ariella Bee on 1/3/2024, prior notes reviewed in detail.  Initial sleep apnea diagnosis approx ,study not available.  Re-evaluated with Home sleep test 3/2018 showed AHI of 27.5/hr with a lowest SpO2 of 87%, weight at time of study 194 pounds. Subsequent BiLevel titration 2021 showed adequate treatment at a pressure of 12/8 cmH2O.     ASSESSMENT/PLAN:   Diagnosis Orders   1. KASIE (obstructive sleep apnea)        2. Anxiety and depression        3. BMI 39.0-39.9,adult            On ResMed:  AirCurve 10 VAuto:    Settings:  Mode - VAuto    Max IPAP:  20 cmH2O    Min EPAP: 09 cmH2O    PS: 04 cmH2O    Sleep Apnea -   * She is adherent with PAP therapy and PAP continues to benefit patient and remains necessary for control of her sleep apnea.  Continue on current pressures    * Supplies for his device were ordered as noted below:    Orders Placed This Encounter   Procedures    DME Order for (Specify) as OP     Diagnosis: (G47.33) KASIE (obstructive sleep apnea)  (primary encounter diagnosis)   Replacement Supplies for Positive Airway Pressure Therapy Device:   Duration of need: 99 months.      Nasal Pillows Combo Mask (Replace) 2 per month.   Nasal Pillows (Replace) 2 per month.      Full Face Mask 1 every 3 months.   Full Face Mask Cushion 1 per month.     Nasal Cushion (Replace) 2

## 2025-03-11 NOTE — PATIENT INSTRUCTIONS
drowsiness. Medications that impair a drivers attention should have a warning label. Alcohol naturally makes you sleepy and on its own can cause accidents. Combined with excessive drowsiness its effects are amplified.   Signs of Drowsy Driving:   * You don't remember driving the last few miles   * You may drift out of your gina   * You are unable to focus and your thoughts wander   * You may yawn more often than normal   * You have difficulty keeping your eyes open / nodding off   * Missing traffic signs, speeding, or tailgating  Prevention-   Good sleep hygiene, lifestyle and behavioral choices have the most impact on drowsy driving. There is no substitute for sleep and the average person requires 8 hours nightly. If you find yourself driving drowsy, stop and sleep. Consider the sleep hygiene tips provided during your visit as well.     Medication Refill Policy: Refills for all medications require 1 week advance notice. Please have your pharmacy fax a refill request. We are unable to fax, or call in \"controled substance\" medications and you will need to pick these prescriptions up from our office.

## 2025-03-13 ENCOUNTER — TELEPHONE (OUTPATIENT)
Age: 50
End: 2025-03-13

## 2025-03-13 ENCOUNTER — CLINICAL DOCUMENTATION (OUTPATIENT)
Age: 50
End: 2025-03-13

## 2025-04-16 ENCOUNTER — OFFICE VISIT (OUTPATIENT)
Age: 50
End: 2025-04-16

## 2025-04-16 VITALS
OXYGEN SATURATION: 97 % | WEIGHT: 216 LBS | DIASTOLIC BLOOD PRESSURE: 74 MMHG | RESPIRATION RATE: 18 BRPM | SYSTOLIC BLOOD PRESSURE: 129 MMHG | HEART RATE: 97 BPM | TEMPERATURE: 100.2 F | BODY MASS INDEX: 40.78 KG/M2

## 2025-04-16 DIAGNOSIS — U07.1 COVID-19: Primary | ICD-10-CM

## 2025-04-16 DIAGNOSIS — J06.9 UPPER RESPIRATORY TRACT INFECTION, UNSPECIFIED TYPE: ICD-10-CM

## 2025-04-16 LAB
EXP DATE SOLUTION: ABNORMAL
EXP DATE SWAB: ABNORMAL
EXPIRATION DATE: ABNORMAL
INFLUENZA A ANTIGEN, POC: NEGATIVE
INFLUENZA B ANTIGEN, POC: NEGATIVE
LOT NUMBER POC: ABNORMAL
LOT NUMBER SOLUTION: ABNORMAL
LOT NUMBER SWAB: ABNORMAL
SARS-COV-2 RNA, POC: POSITIVE

## 2025-04-16 RX ORDER — BENZONATATE 200 MG/1
200 CAPSULE ORAL 3 TIMES DAILY PRN
Qty: 30 CAPSULE | Refills: 0 | Status: SHIPPED | OUTPATIENT
Start: 2025-04-16 | End: 2025-04-26

## 2025-04-16 NOTE — PATIENT INSTRUCTIONS
Thank you for visiting [facility] today    Covid-19 -  Your Covid testing was positive in clinic today (influenza testing was negative)  Paxlovid, twice daily for 5 days as directed  Tessalon for cough, every 8 hours as needed  Recommend over-the-counter antihistamines and decongestants as needed (please see below)  Can also do a nasal steroid spray or nasal saline spray as needed  Continue acetaminophen or ibuprofen as needed for body aches/fever  Drink plenty of fluids  Please see below for additional symptomatic treatment recommendations:    Nasal Congestion:  Steroid nasal spray, such as Flonase, Nasonex, or Nasacort  Normal saline nasal spray  Afrin nasal spray no longer than three days  Oral decongestants, such as Sudafed/pseudoephedrine  Do not take if you have a history of high blood pressure, take Coricidin HBP (or the generic form) instead. Follow instructions on the box  Cough:  Throat lozenges, hot tea, and honey may help  Vicks VapoRub at night to help with cough and relieve muscles aches and pain  If not prescribed a cough medication, Delsym is an option. It is an over the counter cough medication containing dextromethorphan to help suppress cough at night   *Please only take when absolutely needed, as this is a controlled substance that can cause addiction   *Please only take cough syrup at nighttime as it causes drowsiness   *Do not drive or operate any machinery while taking this medication  Chest Congestion:  For thick mucus, take an expectorant (guaifenesin only) to help thin the mucus. Follow instructions on the box. You will need to drink plenty of water with this medication  Sore Throat:  Lozenges, as needed. Cepacol lozenges will help numb the throat  Chloraseptic spray also helps to numb throat pain  Salt water gargles (1/4 tsp salt in 8 oz of water) to soothe throat pain  Sinus pain/pressure:  Warm, wet towel on face to help with facial sinus pain/pressure  Headache/Pain

## 2025-04-16 NOTE — PROGRESS NOTES
Andra Balderas (:  1975) is a 50 y.o. female,Established patient, here for evaluation of the following chief complaint(s):  Cold Symptoms (Pt presents to clinic w/ c/o cough, post nasal drip and body aches. Reports sx onset of 04/15/2025. Reports OTC usage to include ibuprofen, cough syrup, cough drops.)        SUBJECTIVE/OBJECTIVE:    History provided by:  Patient  Cold Symptoms          50 y.o. female presents with symptoms of postnasal drip, nasal congestion, body aches, and cough.  Cough is nonproductive.  No fever but currently her temperature is elevated.  She states not much of a sore throat.  She has been taking over-the-counter ibuprofen, over-the-counter cough medicine, and cough drops.    Patient has history of Covid about 2 years ago, took Paxlovid with no adverse effects.  She states she had both her flu and Covid boosters/vaccinations in 2024.    She denies any known exposures but was at the [employer] on Saturday around a lot of people.        Vitals:    25 1339   BP: 129/74   BP Site: Left Upper Arm   Patient Position: Sitting   BP Cuff Size: Medium Adult   Pulse: 97   Resp: 18   Temp: 100.2 °F (37.9 °C)   TempSrc: Oral   SpO2: 97%   Weight: 98 kg (216 lb)       Results for orders placed or performed in visit on 25   POCT Influenza A/B Antigen   Result Value Ref Range    Inflenza A Ag Negative     Influenza B Ag Negative    AMB POC COVID-19 COV   Result Value Ref Range    SARS-COV-2 RNA, POC Positive     Lot number swab      EXP date swab      Lot number solution      EXP date solution      LOT NUMBER ,[id number]     EXPIRATION DATE          Physical Exam  Constitutional:       General: She is not in acute distress.     Appearance: Normal appearance. She is not ill-appearing or toxic-appearing.   HENT:      Head: Normocephalic and atraumatic.      Right Ear: Ear canal and external ear normal. There is impacted cerumen.      Left Ear: Tympanic membrane, ear canal and

## 2025-05-20 RX ORDER — ATORVASTATIN CALCIUM 20 MG/1
20 TABLET, FILM COATED ORAL DAILY
Qty: 90 TABLET | Refills: 1 | Status: SHIPPED | OUTPATIENT
Start: 2025-05-20

## 2025-05-20 NOTE — TELEPHONE ENCOUNTER
PCP: Najma Tipton, APRN - NP    Last appt: 11/25/2024     No future appointments.    Requested Prescriptions     Pending Prescriptions Disp Refills    atorvastatin (LIPITOR) 20 MG tablet [Pharmacy Med Name: ATORVASTATIN 20 MG TABLET] 90 tablet 1     Sig: TAKE 1 TABLET BY MOUTH EVERY DAY       Prior labs and Blood pressures:  BP Readings from Last 3 Encounters:   04/16/25 129/74   12/17/24 130/85   11/25/24 128/85     Lab Results   Component Value Date/Time     11/25/2024 12:00 AM    K 4.2 11/25/2024 12:00 AM     11/25/2024 12:00 AM    CO2 21 11/25/2024 12:00 AM    BUN 12 11/25/2024 12:00 AM    GFRAA 100 11/19/2021 12:00 AM     Lab Results   Component Value Date/Time    VWQ8NWCN 5.5 11/25/2019 03:58 PM     Lab Results   Component Value Date/Time    CHOL 237 11/25/2024 12:00 AM    HDL 41 11/25/2024 12:00 AM     11/25/2024 12:00 AM     11/04/2022 12:00 AM    VLDL 62 11/25/2024 12:00 AM    VLDL 81 11/04/2022 12:00 AM     No results found for: \"VITD3\"    Lab Results   Component Value Date/Time    TSH 2.500 11/25/2024 12:00 AM

## 2025-07-02 DIAGNOSIS — R73.03 PREDIABETES: ICD-10-CM

## 2025-07-02 DIAGNOSIS — F32.A ANXIETY AND DEPRESSION: ICD-10-CM

## 2025-07-02 DIAGNOSIS — F41.9 ANXIETY AND DEPRESSION: ICD-10-CM

## 2025-07-02 NOTE — TELEPHONE ENCOUNTER
PCP: Najma Tipton, APRN - NP    Last appt: 11/25/2024   No future appointments.    Requested Prescriptions     Pending Prescriptions Disp Refills    FLUoxetine (PROZAC) 10 MG capsule [Pharmacy Med Name: FLUOXETINE HCL 10 MG CAPSULE] 90 capsule 1     Sig: TAKE 1 CAPSULE BY MOUTH EVERY DAY    metFORMIN (GLUCOPHAGE-XR) 500 MG extended release tablet [Pharmacy Med Name: METFORMIN HCL  MG TABLET] 90 tablet 1     Sig: TAKE 1 TABLET BY MOUTH DAILY WITH SUPPER    buPROPion (WELLBUTRIN XL) 150 MG extended release tablet [Pharmacy Med Name: BUPROPION HCL  MG TABLET] 90 tablet 1     Sig: TAKE 1 TABLET BY MOUTH EVERY DAY IN THE MORNING

## 2025-07-10 NOTE — TELEPHONE ENCOUNTER
Last appointment: 11/25/24 Saeed  Next appointment: 11/28/25 Saeed  Previous refill encounter(s): 11/25/24 90 + 1 (all 3)    Requested Prescriptions     Pending Prescriptions Disp Refills    FLUoxetine (PROZAC) 10 MG capsule [Pharmacy Med Name: FLUOXETINE HCL 10 MG CAPSULE] 90 capsule 1     Sig: Take 1 capsule by mouth daily    metFORMIN (GLUCOPHAGE-XR) 500 MG extended release tablet [Pharmacy Med Name: METFORMIN HCL  MG TABLET] 90 tablet 1     Sig: Take 1 tablet by mouth Daily with supper    buPROPion (WELLBUTRIN XL) 150 MG extended release tablet [Pharmacy Med Name: BUPROPION HCL  MG TABLET] 90 tablet 1     Sig: Take 1 tablet by mouth every morning     For Pharmacy Admin Tracking Only    Program: Medication Refill  CPA in place:    Recommendation Provided To:   Intervention Detail: New Rx: 3, reason: Patient Preference  Intervention Accepted By:   Gap Closed?:    Time Spent (min): 5

## 2025-07-11 RX ORDER — BUPROPION HYDROCHLORIDE 150 MG/1
150 TABLET ORAL EVERY MORNING
Qty: 90 TABLET | Refills: 1 | Status: SHIPPED | OUTPATIENT
Start: 2025-07-11

## 2025-07-11 RX ORDER — FLUOXETINE 10 MG/1
10 CAPSULE ORAL DAILY
Qty: 90 CAPSULE | Refills: 1 | Status: SHIPPED | OUTPATIENT
Start: 2025-07-11

## 2025-07-11 RX ORDER — METFORMIN HYDROCHLORIDE 500 MG/1
500 TABLET, EXTENDED RELEASE ORAL
Qty: 90 TABLET | Refills: 1 | Status: SHIPPED | OUTPATIENT
Start: 2025-07-11